# Patient Record
Sex: MALE | Race: WHITE | NOT HISPANIC OR LATINO | Employment: OTHER | URBAN - METROPOLITAN AREA
[De-identification: names, ages, dates, MRNs, and addresses within clinical notes are randomized per-mention and may not be internally consistent; named-entity substitution may affect disease eponyms.]

---

## 2023-11-01 ENCOUNTER — TELEPHONE (OUTPATIENT)
Dept: CARDIOLOGY | Facility: HOSPITAL | Age: 73
End: 2023-11-01
Payer: MEDICARE

## 2023-11-01 DIAGNOSIS — I35.0 AORTIC VALVE STENOSIS, ETIOLOGY OF CARDIAC VALVE DISEASE UNSPECIFIED: ICD-10-CM

## 2023-11-06 PROBLEM — I25.9 CHRONIC ISCHEMIC HEART DISEASE: Status: ACTIVE | Noted: 2023-11-06

## 2023-11-06 PROBLEM — B00.9 HERPES SIMPLEX WITHOUT COMPLICATION: Status: ACTIVE | Noted: 2023-11-06

## 2023-11-06 PROBLEM — M20.40 HAMMER TOE: Status: ACTIVE | Noted: 2023-11-06

## 2023-11-06 PROBLEM — G47.00 INSOMNIA: Status: ACTIVE | Noted: 2023-11-06

## 2023-11-06 PROBLEM — K21.9 GASTROESOPHAGEAL REFLUX DISEASE: Status: ACTIVE | Noted: 2023-11-06

## 2023-11-06 PROBLEM — I10 HYPERTENSION: Status: ACTIVE | Noted: 2023-11-06

## 2023-11-06 PROBLEM — J44.9 CHRONIC OBSTRUCTIVE LUNG DISEASE (MULTI): Status: ACTIVE | Noted: 2023-11-06

## 2023-11-06 PROBLEM — L84 CALLUS: Status: ACTIVE | Noted: 2023-11-06

## 2023-11-06 PROBLEM — B35.1 ONYCHOMYCOSIS OF TOENAIL: Status: ACTIVE | Noted: 2023-11-06

## 2023-11-06 PROBLEM — F32.A DEPRESSIVE DISORDER: Status: ACTIVE | Noted: 2023-11-06

## 2023-11-06 PROBLEM — Z85.820 HISTORY OF MALIGNANT MELANOMA OF SKIN: Status: ACTIVE | Noted: 2023-11-06

## 2023-11-06 PROBLEM — I35.0 AORTIC VALVE STENOSIS: Status: ACTIVE | Noted: 2023-11-06

## 2023-11-06 PROBLEM — M25.579 PAIN IN JOINT INVOLVING ANKLE AND FOOT: Status: ACTIVE | Noted: 2023-11-06

## 2023-11-06 PROBLEM — I05.0 MITRAL STENOSIS: Status: ACTIVE | Noted: 2023-11-06

## 2023-11-06 PROBLEM — F43.10 POSTTRAUMATIC STRESS DISORDER: Status: ACTIVE | Noted: 2023-11-06

## 2023-11-06 PROBLEM — G62.9 NEUROPATHY: Status: ACTIVE | Noted: 2023-11-06

## 2023-11-06 RX ORDER — MELATONIN 10 MG
10 CAPSULE ORAL NIGHTLY
COMMUNITY

## 2023-11-06 RX ORDER — METOPROLOL TARTRATE 25 MG/1
1 TABLET, FILM COATED ORAL DAILY
COMMUNITY
End: 2023-12-04 | Stop reason: SDUPTHER

## 2023-11-06 RX ORDER — LOSARTAN POTASSIUM 50 MG/1
1 TABLET ORAL DAILY
COMMUNITY
End: 2023-12-04 | Stop reason: ALTCHOICE

## 2023-11-06 RX ORDER — ESCITALOPRAM OXALATE 10 MG/1
1 TABLET ORAL 2 TIMES DAILY
COMMUNITY
Start: 2023-06-14

## 2023-11-06 RX ORDER — ASPIRIN 81 MG/1
1 TABLET ORAL DAILY
COMMUNITY
End: 2023-12-04 | Stop reason: ALTCHOICE

## 2023-11-06 RX ORDER — UBIDECARENONE 75 MG
500 CAPSULE ORAL
COMMUNITY
Start: 2023-08-14

## 2023-11-06 RX ORDER — ATORVASTATIN CALCIUM 80 MG/1
80 TABLET, FILM COATED ORAL DAILY
COMMUNITY
Start: 2023-10-16

## 2023-11-06 RX ORDER — CETIRIZINE HYDROCHLORIDE 5 MG/1
1 TABLET ORAL DAILY
COMMUNITY
Start: 2018-09-21 | End: 2023-12-04 | Stop reason: ALTCHOICE

## 2023-11-06 RX ORDER — CALCIUM CARBONATE/VITAMIN D3 600MG-5MCG
1 TABLET ORAL 2 TIMES DAILY
COMMUNITY

## 2023-11-06 RX ORDER — ESCITALOPRAM OXALATE 5 MG/1
1 TABLET ORAL DAILY
COMMUNITY
End: 2023-12-04 | Stop reason: ALTCHOICE

## 2023-11-06 RX ORDER — ASPIRIN 325 MG
1 TABLET, DELAYED RELEASE (ENTERIC COATED) ORAL DAILY
COMMUNITY
Start: 2018-09-21 | End: 2023-12-04 | Stop reason: ALTCHOICE

## 2023-11-06 RX ORDER — ZOLPIDEM TARTRATE 5 MG/1
5 TABLET ORAL NIGHTLY
COMMUNITY

## 2023-11-06 RX ORDER — METOPROLOL SUCCINATE 25 MG/1
25 TABLET, EXTENDED RELEASE ORAL DAILY
COMMUNITY
Start: 2023-03-02 | End: 2024-01-24 | Stop reason: HOSPADM

## 2023-11-07 ENCOUNTER — ANCILLARY PROCEDURE (OUTPATIENT)
Dept: RADIOLOGY | Facility: CLINIC | Age: 73
End: 2023-11-07
Payer: MEDICARE

## 2023-11-07 DIAGNOSIS — I35.0 NONRHEUMATIC AORTIC (VALVE) STENOSIS: ICD-10-CM

## 2023-11-07 DIAGNOSIS — I25.10 ATHEROSCLEROTIC HEART DISEASE OF NATIVE CORONARY ARTERY WITHOUT ANGINA PECTORIS: ICD-10-CM

## 2023-11-07 PROCEDURE — 74174 CTA ABD&PLVS W/CONTRAST: CPT

## 2023-11-07 PROCEDURE — 71275 CT ANGIOGRAPHY CHEST: CPT | Performed by: INTERNAL MEDICINE

## 2023-11-07 PROCEDURE — 2550000001 HC RX 255 CONTRASTS: Performed by: INTERNAL MEDICINE

## 2023-11-07 PROCEDURE — 74174 CTA ABD&PLVS W/CONTRAST: CPT | Performed by: INTERNAL MEDICINE

## 2023-11-07 RX ADMIN — IOHEXOL 90 ML: 350 INJECTION, SOLUTION INTRAVENOUS at 10:58

## 2023-11-08 ENCOUNTER — OFFICE VISIT (OUTPATIENT)
Dept: CARDIAC SURGERY | Facility: CLINIC | Age: 73
End: 2023-11-08
Payer: MEDICARE

## 2023-11-08 VITALS
OXYGEN SATURATION: 98 % | BODY MASS INDEX: 30.62 KG/M2 | RESPIRATION RATE: 16 BRPM | SYSTOLIC BLOOD PRESSURE: 138 MMHG | TEMPERATURE: 95.7 F | HEART RATE: 76 BPM | DIASTOLIC BLOOD PRESSURE: 71 MMHG | WEIGHT: 231 LBS | HEIGHT: 73 IN

## 2023-11-08 DIAGNOSIS — I35.0 NONRHEUMATIC AORTIC VALVE STENOSIS: Primary | ICD-10-CM

## 2023-11-08 PROCEDURE — 99205 OFFICE O/P NEW HI 60 MIN: CPT | Performed by: THORACIC SURGERY (CARDIOTHORACIC VASCULAR SURGERY)

## 2023-11-08 PROCEDURE — 3075F SYST BP GE 130 - 139MM HG: CPT | Performed by: THORACIC SURGERY (CARDIOTHORACIC VASCULAR SURGERY)

## 2023-11-08 PROCEDURE — 99215 OFFICE O/P EST HI 40 MIN: CPT | Performed by: THORACIC SURGERY (CARDIOTHORACIC VASCULAR SURGERY)

## 2023-11-08 PROCEDURE — 1036F TOBACCO NON-USER: CPT | Performed by: THORACIC SURGERY (CARDIOTHORACIC VASCULAR SURGERY)

## 2023-11-08 PROCEDURE — 3078F DIAST BP <80 MM HG: CPT | Performed by: THORACIC SURGERY (CARDIOTHORACIC VASCULAR SURGERY)

## 2023-11-08 PROCEDURE — 1159F MED LIST DOCD IN RCRD: CPT | Performed by: THORACIC SURGERY (CARDIOTHORACIC VASCULAR SURGERY)

## 2023-11-08 NOTE — PROGRESS NOTES
Chief Complaint  Dyspnea and dizziness    HPI:   Mr. Jerel Drummond is an 73 y.o. male, who is a patient of Dr.John Giovanna Middleton MD   I have been asked to see them by Mak Jensen to evaluate aortic stenosis.  He has had worsening dyspnea, dizziness with activity.  He underwent ECHO and LHC in December and again in January at [facility] with STEMI. He has known about the aortic stenosis for years, and undergoing serial surveillance echocardiography.  The patient states that there are some more blockages, but does not know severity.  He says his aortic valve is moderate.  I do not have images or reports for this.     PMHX:  HTN, HLD, CAD, MI, CVA, COPD  PSHx: Left posterior rib fixation,  Upper lip basal cell cancer resection.  Herniorrhaphy bilateral, Left knee replacement.    FAMHx: Dad had coronary disease.      Social History     Socioeconomic History    Marital status:      Spouse name: Not on file    Number of children: Not on file    Years of education: Not on file    Highest education level: Not on file   Occupational History    Not on file   Tobacco Use    Smoking status: Former     Types: Cigarettes    Smokeless tobacco: Never   Substance and Sexual Activity    Alcohol use: Not Currently    Drug use: Yes     Types: Marijuana    Sexual activity: Not on file   Other Topics Concern    Not on file   Social History Narrative    Not on file     Social Determinants of Health     Financial Resource Strain: Not on file   Food Insecurity: Not on file   Transportation Needs: Not on file   Physical Activity: Not on file   Stress: Not on file   Social Connections: Not on file   Intimate Partner Violence: Not on file   Housing Stability: Not on file       Allergies   Allergen Reactions    Ciprofloxacin Unknown    Codeine Itching    Meloxicam Other    Meperidine Other    Clindamycin Palpitations       Outpatient Encounter Medications as of 11/8/2023   Medication Sig Dispense Refill    atorvastatin  (Lipitor) 80 mg tablet Take 1 tablet (80 mg) by mouth once daily.      calcium carbonate-vitamin D3 600 mg-5 mcg (200 unit) tablet Take 1 tablet by mouth twice a day.      cetirizine (ZYRTEC) 10 mg capsule Take 1 capsule (10 mg) by mouth once daily.      cholecalciferol (Vitamin D-3) 50,000 unit capsule Take 1 capsule (50,000 Units) by mouth once daily.      cyanocobalamin (Vitamin B-12) 500 mcg tablet Take 1 tablet (500 mcg) by mouth.      escitalopram (Lexapro) 10 mg tablet Take 1 tablet (10 mg) by mouth 2 times a day.      melatonin 10 mg capsule Take 1 capsule (10 mg) by mouth once daily at bedtime.      metoprolol succinate XL (Toprol-XL) 25 mg 24 hr tablet Take 1 tablet (25 mg) by mouth once daily.      ticagrelor (Brilinta) 90 mg tablet Take 1 tablet (90 mg) by mouth twice a day.      ticagrelor (Brilinta) 90 mg tablet Take 1 tablet (90 mg) by mouth.  TAKE ONE TABLET BY MOUTH TWICE A DAY FOR ACUTE SYNDROME OF THE HEART DATE TICAGRELOR THERAPY STARTED: DEC 10,2022 DATE OF TICAGRELOR REASSESSMENT: DEC 10,2023      zolpidem (Ambien) 5 mg tablet Take 1 tablet (5 mg) by mouth once daily at bedtime.      aspirin 81 mg EC tablet Take 1 tablet (81 mg) by mouth once daily.      cetirizine (ZyrTEC) 5 mg tablet Take 1 tablet (5 mg) by mouth once daily.      escitalopram (Lexapro) 5 mg tablet Take 1 tablet (5 mg) by mouth once daily.      losartan (Cozaar) 50 mg tablet Take 1 tablet (50 mg) by mouth once daily.      metoprolol tartrate (Lopressor) 25 mg tablet Take 1 tablet (25 mg) by mouth once daily.       No facility-administered encounter medications on file as of 11/8/2023.       Physical Exam  Constitutional:       Appearance: Normal appearance. He is normal weight.   HENT:      Head: Normocephalic and atraumatic.      Mouth/Throat:      Mouth: Mucous membranes are moist.   Eyes:      General: No scleral icterus.     Pupils: Pupils are equal, round, and reactive to light.   Cardiovascular:      Rate and Rhythm:  Normal rate and regular rhythm.      Heart sounds: Murmur heard.   Pulmonary:      Effort: Pulmonary effort is normal.      Breath sounds: Normal breath sounds.   Abdominal:      General: Abdomen is flat. Bowel sounds are normal.   Musculoskeletal:      Cervical back: Normal range of motion.   Neurological:      General: No focal deficit present.      Mental Status: He is alert and oriented to person, place, and time.   Psychiatric:         Mood and Affect: Mood normal.         Thought Content: Thought content normal.         EK2023:  Sinus bradycardia.   ms;   ms. No heart block.   LHC: 2023:  RCA stent placed for ACS (STEMI). No shots of the Left coronary system    Lab Results   Component Value Date    WBC 6.8 2023    HGB 14.4 2023    HCT 42.1 2023    MCV 89 2023     2023     Lab Results   Component Value Date    GLUCOSE 98 2023    CALCIUM 9.1 2023     2023    K 4.2 2023    CO2 27 2023     2023    BUN 19 2023    CREATININE 1.07 2023       Assessment and Plan:   Mr. Jerel Drummond is an 73 y.o. male who presents with symptomatic severe aortic stenosis.  This is associated with HTN, HLD; and in the setting of COPD.  Their symptoms include Dyspnea and Fatigue.  Their imaging is consistent with symptomatic severe aortic stenosis.     We had a nice discussion regarding the indications for intervention on their valvular disease.  This included percutaneous as well as open surgical approaches. I do believe that a catheter based approach is in his best interest, given his age and co-morbidities.  He understands that the goal of this procedure will be to relieve symptoms if present, preserve left ventricular function, and prolong life.  I discussed the specific risks of vascular access bleeding, stroke and need for pacemaker placement.  In further evaluation we will obtain ECHO and LHC (or images from  Left coronary) and CT TAVR protocol. We need to determine if he has significant CAD or severe aortic stenosis.    With regards to their surgical fitness, he is a Moderate  risk candidate.  If there is compromise of vascular access or aortic root anatomy/geometry, I would offer them an open surgical approach.

## 2023-11-08 NOTE — LETTER
November 8, 2023     Mak Carrillo MD  [address]  [facility] [address]    Patient: Jerel Drummond   YOB: 1950   Date of Visit: 11/8/2023       Dear Dr. Mak Carrillo MD:    Thank you for referring Jerel Drummond to me for evaluation. Below are my notes for this consultation.  If you have questions, please do not hesitate to call me. I look forward to following your patient along with you.       Sincerely,     Jakub Eddy MD      CC: Dada Middleton MD  ______________________________________________________________________________________    Chief Complaint  Dyspnea and dizziness    HPI:   Mr. Jerel Drummond is an 73 y.o. male, who is a patient of Dr.John Giovanna Middleton MD   I have been asked to see them by Mak Jensen to evaluate aortic stenosis.  He has had worsening dyspnea, dizziness with activity.  He underwent ECHO and LHC in December and again in January at [facility] with STEMI. He has known about the aortic stenosis for years, and undergoing serial surveillance echocardiography.  The patient states that there are some more blockages, but does not know severity.  He says his aortic valve is moderate.  I do not have images or reports for this.     PMHX:  HTN, HLD, CAD, MI, CVA, COPD  PSHx: Left posterior rib fixation,  Upper lip basal cell cancer resection.  Herniorrhaphy bilateral, Left knee replacement.    FAMHx: Dad had coronary disease.      Social History     Socioeconomic History   • Marital status:      Spouse name: Not on file   • Number of children: Not on file   • Years of education: Not on file   • Highest education level: Not on file   Occupational History   • Not on file   Tobacco Use   • Smoking status: Former     Types: Cigarettes   • Smokeless tobacco: Never   Substance and Sexual Activity   • Alcohol use: Not Currently   • Drug use: Yes     Types: Marijuana   • Sexual activity: Not on file   Other Topics Concern   • Not on file    Social History Narrative   • Not on file     Social Determinants of Health     Financial Resource Strain: Not on file   Food Insecurity: Not on file   Transportation Needs: Not on file   Physical Activity: Not on file   Stress: Not on file   Social Connections: Not on file   Intimate Partner Violence: Not on file   Housing Stability: Not on file       Allergies   Allergen Reactions   • Ciprofloxacin Unknown   • Codeine Itching   • Meloxicam Other   • Meperidine Other   • Clindamycin Palpitations       Outpatient Encounter Medications as of 11/8/2023   Medication Sig Dispense Refill   • atorvastatin (Lipitor) 80 mg tablet Take 1 tablet (80 mg) by mouth once daily.     • calcium carbonate-vitamin D3 600 mg-5 mcg (200 unit) tablet Take 1 tablet by mouth twice a day.     • cetirizine (ZYRTEC) 10 mg capsule Take 1 capsule (10 mg) by mouth once daily.     • cholecalciferol (Vitamin D-3) 50,000 unit capsule Take 1 capsule (50,000 Units) by mouth once daily.     • cyanocobalamin (Vitamin B-12) 500 mcg tablet Take 1 tablet (500 mcg) by mouth.     • escitalopram (Lexapro) 10 mg tablet Take 1 tablet (10 mg) by mouth 2 times a day.     • melatonin 10 mg capsule Take 1 capsule (10 mg) by mouth once daily at bedtime.     • metoprolol succinate XL (Toprol-XL) 25 mg 24 hr tablet Take 1 tablet (25 mg) by mouth once daily.     • ticagrelor (Brilinta) 90 mg tablet Take 1 tablet (90 mg) by mouth twice a day.     • ticagrelor (Brilinta) 90 mg tablet Take 1 tablet (90 mg) by mouth.  TAKE ONE TABLET BY MOUTH TWICE A DAY FOR ACUTE SYNDROME OF THE HEART DATE TICAGRELOR THERAPY STARTED: DEC 10,2022 DATE OF TICAGRELOR REASSESSMENT: DEC 10,2023     • zolpidem (Ambien) 5 mg tablet Take 1 tablet (5 mg) by mouth once daily at bedtime.     • aspirin 81 mg EC tablet Take 1 tablet (81 mg) by mouth once daily.     • cetirizine (ZyrTEC) 5 mg tablet Take 1 tablet (5 mg) by mouth once daily.     • escitalopram (Lexapro) 5 mg tablet Take 1 tablet (5  mg) by mouth once daily.     • losartan (Cozaar) 50 mg tablet Take 1 tablet (50 mg) by mouth once daily.     • metoprolol tartrate (Lopressor) 25 mg tablet Take 1 tablet (25 mg) by mouth once daily.       No facility-administered encounter medications on file as of 2023.       Physical Exam  Constitutional:       Appearance: Normal appearance. He is normal weight.   HENT:      Head: Normocephalic and atraumatic.      Mouth/Throat:      Mouth: Mucous membranes are moist.   Eyes:      General: No scleral icterus.     Pupils: Pupils are equal, round, and reactive to light.   Cardiovascular:      Rate and Rhythm: Normal rate and regular rhythm.      Heart sounds: Murmur heard.   Pulmonary:      Effort: Pulmonary effort is normal.      Breath sounds: Normal breath sounds.   Abdominal:      General: Abdomen is flat. Bowel sounds are normal.   Musculoskeletal:      Cervical back: Normal range of motion.   Neurological:      General: No focal deficit present.      Mental Status: He is alert and oriented to person, place, and time.   Psychiatric:         Mood and Affect: Mood normal.         Thought Content: Thought content normal.         EK2023:  Sinus bradycardia.   ms;   ms. No heart block.   LHC: 2023:  RCA stent placed for ACS (STEMI). No shots of the Left coronary system    Lab Results   Component Value Date    WBC 6.8 2023    HGB 14.4 2023    HCT 42.1 2023    MCV 89 2023     2023     Lab Results   Component Value Date    GLUCOSE 98 2023    CALCIUM 9.1 2023     2023    K 4.2 2023    CO2 27 2023     2023    BUN 19 2023    CREATININE 1.07 2023       Assessment and Plan:   Mr. Jerel Drummond is an 73 y.o. male who presents with symptomatic severe aortic stenosis.  This is associated with HTN, HLD; and in the setting of COPD.  Their symptoms include Dyspnea and Fatigue.  Their imaging is  consistent with symptomatic severe aortic stenosis.     We had a nice discussion regarding the indications for intervention on their valvular disease.  This included percutaneous as well as open surgical approaches. I do believe that a catheter based approach is in his best interest, given his age and co-morbidities.  He understands that the goal of this procedure will be to relieve symptoms if present, preserve left ventricular function, and prolong life.  I discussed the specific risks of vascular access bleeding, stroke and need for pacemaker placement.  In further evaluation we will obtain ECHO and LHC (or images from Left coronary) and CT TAVR protocol. We need to determine if he has significant CAD or severe aortic stenosis.    With regards to their surgical fitness, he is a Moderate  risk candidate.  If there is compromise of vascular access or aortic root anatomy/geometry, I would offer them an open surgical approach.

## 2023-11-15 ENCOUNTER — TELEMEDICINE (OUTPATIENT)
Dept: CARDIOLOGY | Facility: HOSPITAL | Age: 73
End: 2023-11-15
Payer: MEDICARE

## 2023-11-15 DIAGNOSIS — I35.0 SEVERE AORTIC STENOSIS: Primary | ICD-10-CM

## 2023-11-15 PROCEDURE — 99205 OFFICE O/P NEW HI 60 MIN: CPT | Performed by: INTERNAL MEDICINE

## 2023-11-15 NOTE — PROGRESS NOTES
PCP: Dr. Middleton   Cardio: Dr. Carrillo     I was asked by Dr. Carrillo  to evaluate this patient in consultation for evaluation of severe aortic stenosis.   ?  The patient is a 73-year-old male with past medical history of hypertension, hyperlipidemia, CAD, MI, and CVA who has been having worsening shortness of breath and fatigue for 1 year or so with activities like walking or doing any exertion.     He denies any chest pain, syncope. No LE edema or orthopnea.    He was found to have severe aortic stenosis and referred for C that showed severe RCA disease treated with successful PCI using RANDI. He has been referred for CTA (images unable to reviewed).    The work-up otherwise is as summarized below:    Echo images were reviewed for this encounter and as summarized below.    NYHA: Class II  Frailty score: 1/5  EKG: Normal sinus,  ms  Echo  -September 2023 EF 6065%, aortic valve mean gradient 39 mmHg, aortic valve V-max 4.29 m/s, aortic valve area 0.92 cm,  C -PCI of RCA in January 2023, no images for diagnostic cath available  CT-TAVR: done  Dental: Pending    STS Score:  Procedure Type: Isolated AVR  Perioperative Outcome Estimate %  Operative Mortality 1.4%  Morbidity & Mortality  7.33%  Stroke 0.969%  Renal Failure 0.996%  Reoperation   4.3%  Prolonged Ventilation 2.59%  Deep Sternal Wound Infection 0.048%  Long Hospital Stay (>14 days) 2.26%  Short Hospital Stay (<6 days)* 53%  ??  Given the patient's severe symptomatic aortic stenosis, the patient is referred to our structural heart clinic for consideration of aortic valve replacement.          ROS:  Constitutional: + tired fatigued, not feeling poorly, no fever and no chills.   Eyes: no eyesight problems, no blurred vision, no diplopia, no eye pain, no purulent discharge from the eyes, eyes not red, no dryness of the eyes and no itching of the eyes.   ENT: no nosebleeds, no hearing loss, no tinnitus, no earache, no sore throat, no  hoarseness, no swollen glands in the neck and no nasal discharge.   Cardiovascular: dyspnea on exertion  Respiratory: + dyspnea, no chronic cough, not coughing up sputum,  no wheezing that is consistent with asthma, no asthma, no orthopnea and no postural nocturnal dyspnea.   Gastrointestinal: no change in bowel habits, no blood in stools, no diarrhea, no constipation, no nausea, no vomiting, no abdominal pain, no signs and symptoms of ulcer disease, no akshat colored stools and no intolerance to fatty foods.   Genitourinary: no hematuria,  no urinary frequency, no dysuria, no incontinence, no burning sensation during urination, no urinary hesitancy, no nocturia, no genital lesion, no testicular pain, urinary stream is not smaller and urinary stream does not start and stop.   Musculoskeletal: no arthralgias, no myalgias, no joint swelling, no joint stiffness, no muscle weakness, no back pain, no limb pain, no limb swelling and no difficulty walking.   Skin: no skin rashes, no change in skin color and pigmentation, no skin lesions and no skin lumps.   Neurological: no seizures, no frequent falls, no headaches, no dizziness, no tingling, no fainting and no limb weakness.   Psychiatric: no depression, not suicidal, no confusion, no memory lapses or loss, no anxiety, no personality change and no emotional problems.   Endocrine: no goiter, no thyroid disorder, no diabetes mellitus, no excessive thirst, no dry skin, no cold intolerance, no heat intolerance, no erectile dysfunction, no increased urinary frequency, no proptosis and no deepening of the voice.   Hematologic/Lymphatic: no bleeding issues.   All other systems have been reviewed and are negative for complaint.         Visit Vitals  Smoking Status Former         Results for orders placed or performed in visit on 01/19/23   Basic Metabolic Panel   Result Value Ref Range    Glucose 98 74 - 99 mg/dL    Sodium 139 136 - 145 mmol/L    Potassium 4.2 3.5 - 5.3 mmol/L     Chloride 106 98 - 107 mmol/L    Bicarbonate 27 21 - 32 mmol/L    Anion Gap 10 10 - 20 mmol/L    Urea Nitrogen 19 6 - 23 mg/dL    Creatinine 1.07 0.50 - 1.30 mg/dL    GFR MALE 72 >90 mL/min/1.73m2    Calcium 9.1 8.6 - 10.3 mg/dL   Protime-INR   Result Value Ref Range    Protime 12.1 9.8 - 13.4 sec    INR 1.0 0.9 - 1.1   CBC   Result Value Ref Range    WBC 6.8 4.4 - 11.3 x10E9/L    RBC 4.75 4.50 - 5.90 x10E12/L    Hemoglobin 14.4 13.5 - 17.5 g/dL    Hematocrit 42.1 41.0 - 52.0 %    MCV 89 80 - 100 fL    MCHC 34.2 32.0 - 36.0 g/dL    Platelets 159 150 - 450 x10E9/L    RDW 13.5 11.5 - 14.5 %   Electrocardiogram 12 Lead   Result Value Ref Range    Ventricular Rate 58 BPM    Atrial Rate 58 BPM    MO Interval 202 ms    QRS Duration 106 ms    QT Interval 446 ms    QTC Calculation(Bazett) 437 ms    P Axis 1 degrees    R Axis -51 degrees    T Axis 35 degrees    QRS Count 10 beats    Q Onset 207 ms    P Onset 106 ms    P Offset 149 ms    T Offset 430 ms    QTC Fredericia 440 ms   Electrocardiogram 12 Lead   Result Value Ref Range    Ventricular Rate 58 BPM    Atrial Rate 58 BPM    MO Interval 196 ms    QRS Duration 106 ms    QT Interval 428 ms    QTC Calculation(Bazett) 420 ms    P Axis -3 degrees    R Axis -44 degrees    T Axis 11 degrees    QRS Count 10 beats    Q Onset 209 ms    P Onset 111 ms    P Offset 161 ms    T Offset 423 ms    QTC Fredericia 423 ms            Current Outpatient Medications   Medication Instructions    aspirin 81 mg EC tablet 1 tablet, oral, Daily    atorvastatin (LIPITOR) 80 mg, oral, Daily    calcium carbonate-vitamin D3 600 mg-5 mcg (200 unit) tablet 1 tablet, oral, 2 times daily    cetirizine (ZyrTEC) 5 mg tablet 1 tablet, oral, Daily    cetirizine (ZYRTEC) 10 mg, oral, Daily    cholecalciferol (Vitamin D-3) 50,000 unit capsule 1 capsule, oral, Daily    cyanocobalamin (VITAMIN B-12) 500 mcg, oral    escitalopram (Lexapro) 10 mg tablet 1 tablet, oral, 2 times daily    escitalopram (Lexapro) 5 mg  tablet 1 tablet, oral, Daily    losartan (Cozaar) 50 mg tablet 1 tablet, oral, Daily    melatonin 10 mg, oral, Nightly    metoprolol succinate XL (TOPROL-XL) 25 mg, oral, Daily    metoprolol tartrate (Lopressor) 25 mg tablet 1 tablet, oral, Daily    ticagrelor (BRILINTA) 90 mg, oral, 2 times daily    ticagrelor (BRILINTA) 90 mg, oral,  TAKE ONE TABLET BY MOUTH TWICE A DAY FOR ACUTE SYNDROME OF THE HEART DATE TICAGRELOR THERAPY STARTED: DEC 10,2022 DATE OF TICAGRELOR REASSESSMENT: DEC 10,2023    zolpidem (AMBIEN) 5 mg, oral, Nightly          Assessment:  Patient will 72-year-old male with symptomatic severe aortic stenosis, NYHA II symptoms    -Patient has undergone work-up with left heart cath, echocardiogram and CT TAVR  -We will get images for left heart cath and echocardiogram    The overall decision regarding the best treatment for her condition is complex.  We discussed options of both surgical aortic valve replacement and transcatheter aortic valve replacement along with with risks and benefits involved with both of them in detail.    All the risks of the procedures including but not limited to groin complications, CVA, MI, pericardial tamponade, risk of PPM and death were discussed with the patient and family in detail .The patient verbalized understanding and decided to proceed with the procedure.    The patient will be discussed at Valve Team meeting for final decision making.     Thank you Dr. Carrillo  for allowing us to participate in this patient's care.

## 2023-11-20 ENCOUNTER — CARDIOLOGY CONFERENCE (OUTPATIENT)
Dept: CARDIOLOGY | Facility: HOSPITAL | Age: 73
End: 2023-11-20
Payer: MEDICARE

## 2023-11-20 NOTE — PROGRESS NOTES
Case reviewed in Valve and Structural Heart team meeting.  Heavily calcified valve.  Sent email to Dr. Coon and Dr. Eddy.  Recommend surgery.

## 2023-11-24 ENCOUNTER — HOSPITAL ENCOUNTER (OUTPATIENT)
Dept: RADIOLOGY | Facility: EXTERNAL LOCATION | Age: 73
Discharge: HOME | End: 2023-11-24

## 2023-12-04 ENCOUNTER — TELEMEDICINE (OUTPATIENT)
Dept: CARDIAC SURGERY | Facility: CLINIC | Age: 73
End: 2023-12-04
Payer: MEDICARE

## 2023-12-04 DIAGNOSIS — I35.0 NONRHEUMATIC AORTIC VALVE STENOSIS: Primary | ICD-10-CM

## 2023-12-04 PROCEDURE — 99215 OFFICE O/P EST HI 40 MIN: CPT | Performed by: THORACIC SURGERY (CARDIOTHORACIC VASCULAR SURGERY)

## 2023-12-04 RX ORDER — ACETAMINOPHEN 500 MG
5000 TABLET ORAL DAILY
COMMUNITY

## 2023-12-04 SDOH — ECONOMIC STABILITY: FOOD INSECURITY: WITHIN THE PAST 12 MONTHS, YOU WORRIED THAT YOUR FOOD WOULD RUN OUT BEFORE YOU GOT MONEY TO BUY MORE.: NEVER TRUE

## 2023-12-04 SDOH — ECONOMIC STABILITY: FOOD INSECURITY: WITHIN THE PAST 12 MONTHS, THE FOOD YOU BOUGHT JUST DIDN'T LAST AND YOU DIDN'T HAVE MONEY TO GET MORE.: NEVER TRUE

## 2023-12-04 ASSESSMENT — COLUMBIA-SUICIDE SEVERITY RATING SCALE - C-SSRS
2. HAVE YOU ACTUALLY HAD ANY THOUGHTS OF KILLING YOURSELF?: NO
6. HAVE YOU EVER DONE ANYTHING, STARTED TO DO ANYTHING, OR PREPARED TO DO ANYTHING TO END YOUR LIFE?: NO
1. IN THE PAST MONTH, HAVE YOU WISHED YOU WERE DEAD OR WISHED YOU COULD GO TO SLEEP AND NOT WAKE UP?: NO

## 2023-12-04 ASSESSMENT — PATIENT HEALTH QUESTIONNAIRE - PHQ9
SUM OF ALL RESPONSES TO PHQ9 QUESTIONS 1 AND 2: 0
2. FEELING DOWN, DEPRESSED OR HOPELESS: NOT AT ALL
1. LITTLE INTEREST OR PLEASURE IN DOING THINGS: NOT AT ALL

## 2023-12-04 ASSESSMENT — ENCOUNTER SYMPTOMS
OCCASIONAL FEELINGS OF UNSTEADINESS: 1
DEPRESSION: 0
LOSS OF SENSATION IN FEET: 1

## 2023-12-04 NOTE — PROGRESS NOTES
Chief Complaint  Dyspnea and Fatigue    HPI:  Mr. Jerel Drummond is an 73 y.o. male, who is a patient of Dr.John Giovanna Middleton MD   I have been asked to see them by Mak Jensen to evaluate aortic stenosis.  He has had worsening dyspnea, dizziness with activity.  He underwent ECHO and LHC in December and again in January at [facility] with STEMI. He has known about the aortic stenosis for years, and undergoing serial surveillance echocardiography. Upon evaluation by the D selection committee, he has a heavily calcified bicuspid aortic valve, and would be best served with a surgical approach.  He is calling today to go over specifics of surgical recommendations. He has fatigue as his biggest symptom. He denies CP, syncope or PND.       Past Medical History:   Diagnosis Date    Aortic stenosis     Restrepo's esophagus     Cataract     Depression     Fracture, ribs     HTN (hypertension)     Hyperlipidemia     Insomnia     Neuropathy        Past Surgical History:   Procedure Laterality Date    CATARACT EXTRACTION      RETINAL DETACHMENT SURGERY      RIB FRACTURE SURGERY         Family History   Problem Relation Name Age of Onset    Stomach cancer Mother         Social History     Socioeconomic History    Marital status:      Spouse name: Not on file    Number of children: Not on file    Years of education: Not on file    Highest education level: Not on file   Occupational History    Not on file   Tobacco Use    Smoking status: Former     Types: Cigarettes    Smokeless tobacco: Never   Substance and Sexual Activity    Alcohol use: Not Currently    Drug use: Yes     Types: Marijuana    Sexual activity: Not on file   Other Topics Concern    Not on file   Social History Narrative    Not on file     Social Determinants of Health     Financial Resource Strain: Not on file   Food Insecurity: No Food Insecurity (12/4/2023)    Hunger Vital Sign     Worried About Running Out of Food in the Last Year:  Never true     Ran Out of Food in the Last Year: Never true   Transportation Needs: Not on file   Physical Activity: Not on file   Stress: Not on file   Social Connections: Not on file   Intimate Partner Violence: Not on file   Housing Stability: Not on file       Allergies   Allergen Reactions    Chlorine Swelling    Ciprofloxacin Unknown    Codeine Itching    Meloxicam Other    Meperidine Other    Clindamycin Palpitations       Outpatient Encounter Medications as of 12/4/2023   Medication Sig Dispense Refill    atorvastatin (Lipitor) 80 mg tablet Take 1 tablet (80 mg) by mouth once daily.      calcium carbonate-vitamin D3 600 mg-5 mcg (200 unit) tablet Take 1 tablet by mouth twice a day.      cetirizine (ZYRTEC) 10 mg capsule Take 1 capsule (10 mg) by mouth once daily.      cholecalciferol (Vitamin D3) 5,000 Units tablet Take 1 tablet (5,000 Units) by mouth once daily.      cyanocobalamin (Vitamin B-12) 500 mcg tablet Take 1 tablet (500 mcg) by mouth.      escitalopram (Lexapro) 10 mg tablet Take 1 tablet (10 mg) by mouth 2 times a day.      melatonin 10 mg capsule Take 1 capsule (10 mg) by mouth once daily at bedtime.      metoprolol succinate XL (Toprol-XL) 25 mg 24 hr tablet Take 1 tablet (25 mg) by mouth once daily.      ticagrelor (Brilinta) 90 mg tablet Take 1 tablet (90 mg) by mouth.  TAKE ONE TABLET BY MOUTH TWICE A DAY FOR ACUTE SYNDROME OF THE HEART DATE TICAGRELOR THERAPY STARTED: DEC 10,2022 DATE OF TICAGRELOR REASSESSMENT: DEC 10,2023      zolpidem (Ambien) 5 mg tablet Take 1 tablet (5 mg) by mouth once daily at bedtime.      [DISCONTINUED] aspirin 81 mg EC tablet Take 1 tablet (81 mg) by mouth once daily.      [DISCONTINUED] cetirizine (ZyrTEC) 5 mg tablet Take 1 tablet (5 mg) by mouth once daily.      [DISCONTINUED] cholecalciferol (Vitamin D-3) 50,000 unit capsule Take 1 capsule (50,000 Units) by mouth once daily.      [DISCONTINUED] escitalopram (Lexapro) 5 mg tablet Take 1 tablet (5 mg) by mouth  once daily.      [DISCONTINUED] losartan (Cozaar) 50 mg tablet Take 1 tablet (50 mg) by mouth once daily.      [DISCONTINUED] metoprolol tartrate (Lopressor) 25 mg tablet Take 1 tablet (25 mg) by mouth once daily.      [DISCONTINUED] ticagrelor (Brilinta) 90 mg tablet Take 1 tablet (90 mg) by mouth twice a day.       No facility-administered encounter medications on file as of 2023.       Physical Exam  Neurological:      Mental Status: He is alert and oriented to person, place, and time.         No results found for this or any previous visit (from the past 4464 hour(s)).    Lab Results   Component Value Date    WBC 6.8 2023    HGB 14.4 2023    HCT 42.1 2023    MCV 89 2023     2023     Lab Results   Component Value Date    GLUCOSE 98 2023    CALCIUM 9.1 2023     2023    K 4.2 2023    CO2 27 2023     2023    BUN 19 2023    CREATININE 1.07 2023     EK2023:  Sinus bradycardia.   ms;   ms. No heart block.   LHC: 105/23:  Patent stents in the RCA, LAD and LCX.  No significant flow limiting CAD.     Assessment and Plan:   Mr. Jerel Drummond is an 73 y.o. male who presents with symptomatic severe aortic stenosis.  This is associated with HTN, HLD; and in the setting of COPD.  Their symptoms include Dyspnea and Fatigue.  Their imaging is consistent with symptomatic severe aortic stenosis.      We had a nice discussion regarding the indications for intervention on their valvular disease.  This included percutaneous as well as open surgical approaches.  He understands that the goal of this procedure will be to relieve symptoms if present, preserve left ventricular function, and prolong life.  I discussed the specific risks of vascular access bleeding, stroke and need for pacemaker placement. We discussed that given his BAV and surgical fitness, the D selection committee and I, thought that a surgical  approach would be in his best interest.  He is interested in a tissue valve.  We  have settled on 1/19/23 for a surgical date, we will arrange for PAT.       I discussed with him our stroke prevention trial, he would like to think about it.

## 2023-12-18 ENCOUNTER — TELEMEDICINE (OUTPATIENT)
Dept: CARDIAC SURGERY | Facility: CLINIC | Age: 73
End: 2023-12-18
Payer: MEDICARE

## 2023-12-18 DIAGNOSIS — I35.0 NONRHEUMATIC AORTIC VALVE STENOSIS: Primary | ICD-10-CM

## 2023-12-18 DIAGNOSIS — Z01.810 PRE-OPERATIVE CARDIOVASCULAR EXAMINATION, RECENT MI (CMS/HCC): ICD-10-CM

## 2023-12-18 DIAGNOSIS — I21.9 PRE-OPERATIVE CARDIOVASCULAR EXAMINATION, RECENT MI (CMS/HCC): ICD-10-CM

## 2023-12-18 DIAGNOSIS — E74.819 DISORDERS OF GLUCOSE TRANSPORT, UNSPECIFIED (MULTI): ICD-10-CM

## 2023-12-18 PROCEDURE — 99214 OFFICE O/P EST MOD 30 MIN: CPT | Performed by: THORACIC SURGERY (CARDIOTHORACIC VASCULAR SURGERY)

## 2023-12-18 RX ORDER — OMEPRAZOLE 40 MG/1
20 CAPSULE, DELAYED RELEASE ORAL
COMMUNITY
Start: 2023-12-13

## 2023-12-18 ASSESSMENT — ENCOUNTER SYMPTOMS
OCCASIONAL FEELINGS OF UNSTEADINESS: 1
LOSS OF SENSATION IN FEET: 1
DEPRESSION: 0

## 2023-12-18 NOTE — LETTER
December 18, 2023     Mak Carrillo MD  [address]  [facility] [address]    Patient: Jerel Drummond   YOB: 1950   Date of Visit: 12/18/2023       Dear Dr. Mak Carrillo MD:    Thank you for referring Jerel Drummond to me for evaluation. Below are my notes for this consultation.  If you have questions, please do not hesitate to call me. I look forward to following your patient along with you.       Sincerely,     Jakub Eddy MD      CC: Dada Middleton MD  ______________________________________________________________________________________    Chief Complaint  Dyspnea and dizziness     HPI:  Mr. Jerel Drummond is an 73 y.o. male, who is a patient of Dr.John Giovanna Middleton MD   I have been asked to see them by Mak Jensen to evaluate aortic stenosis.  He has had worsening dyspnea, dizziness with activity.  He underwent ECHO and LHC in December and again in January at [facility] with STEMI. He has known about the aortic stenosis for years, and undergoing serial surveillance echocardiography. Upon evaluation by the D selection committee, he has a heavily calcified bicuspid aortic valve, and would be best served with a surgical approach.  He is calling today to go over specifics of surgical recommendations. He has fatigue as his biggest symptom. He denies CP, syncope or PND.      PMHX:  HTN, HLD, CAD, MI, CVA, COPD  PSHx: Left posterior rib fixation,  Upper lip basal cell cancer resection.  Herniorrhaphy bilateral, Left knee replacement.    FAMHx: Dad had coronary disease.       Social History   []Expand by Default            Socioeconomic History   • Marital status:        Spouse name: Not on file   • Number of children: Not on file   • Years of education: Not on file   • Highest education level: Not on file   Occupational History   • Not on file   Tobacco Use   • Smoking status: Former       Types: Cigarettes   • Smokeless tobacco: Never   Substance  and Sexual Activity   • Alcohol use: Not Currently   • Drug use: Yes       Types: Marijuana   • Sexual activity: Not on file   Other Topics Concern   • Not on file   Social History Narrative   • Not on file      Social Determinants of Health      Financial Resource Strain: Not on file   Food Insecurity: Not on file   Transportation Needs: Not on file   Physical Activity: Not on file   Stress: Not on file   Social Connections: Not on file   Intimate Partner Violence: Not on file   Housing Stability: Not on file                 Allergies   Allergen Reactions   • Ciprofloxacin Unknown   • Codeine Itching   • Meloxicam Other   • Meperidine Other   • Clindamycin Palpitations         Encounter Medications          Outpatient Encounter Medications as of 11/8/2023   Medication Sig Dispense Refill   • atorvastatin (Lipitor) 80 mg tablet Take 1 tablet (80 mg) by mouth once daily.       • calcium carbonate-vitamin D3 600 mg-5 mcg (200 unit) tablet Take 1 tablet by mouth twice a day.       • cetirizine (ZYRTEC) 10 mg capsule Take 1 capsule (10 mg) by mouth once daily.       • cholecalciferol (Vitamin D-3) 50,000 unit capsule Take 1 capsule (50,000 Units) by mouth once daily.       • cyanocobalamin (Vitamin B-12) 500 mcg tablet Take 1 tablet (500 mcg) by mouth.       • escitalopram (Lexapro) 10 mg tablet Take 1 tablet (10 mg) by mouth 2 times a day.       • melatonin 10 mg capsule Take 1 capsule (10 mg) by mouth once daily at bedtime.       • metoprolol succinate XL (Toprol-XL) 25 mg 24 hr tablet Take 1 tablet (25 mg) by mouth once daily.       • ticagrelor (Brilinta) 90 mg tablet Take 1 tablet (90 mg) by mouth twice a day.       • ticagrelor (Brilinta) 90 mg tablet Take 1 tablet (90 mg) by mouth.    TAKE ONE TABLET BY MOUTH TWICE A DAY FOR ACUTE SYNDROME OF THE HEART DATE TICAGRELOR THERAPY STARTED: DEC 10,2022 DATE OF TICAGRELOR REASSESSMENT: DEC 10,2023       • zolpidem (Ambien) 5 mg tablet Take 1 tablet (5 mg) by mouth once  daily at bedtime.       • aspirin 81 mg EC tablet Take 1 tablet (81 mg) by mouth once daily.       • cetirizine (ZyrTEC) 5 mg tablet Take 1 tablet (5 mg) by mouth once daily.       • escitalopram (Lexapro) 5 mg tablet Take 1 tablet (5 mg) by mouth once daily.       • losartan (Cozaar) 50 mg tablet Take 1 tablet (50 mg) by mouth once daily.       • metoprolol tartrate (Lopressor) 25 mg tablet Take 1 tablet (25 mg) by mouth once daily.          No facility-administered encounter medications on file as of 2023.            Physical Exam  Constitutional:       Appearance: Normal appearance. He is normal weight.   HENT:      Head: Normocephalic and atraumatic.      Mouth/Throat:      Mouth: Mucous membranes are moist.   Eyes:      General: No scleral icterus.     Pupils: Pupils are equal, round, and reactive to light.   Cardiovascular:      Rate and Rhythm: Normal rate and regular rhythm.      Heart sounds: Murmur heard.   Pulmonary:      Effort: Pulmonary effort is normal.      Breath sounds: Normal breath sounds.   Abdominal:      General: Abdomen is flat. Bowel sounds are normal.   Musculoskeletal:      Cervical back: Normal range of motion.   Neurological:      General: No focal deficit present.      Mental Status: He is alert and oriented to person, place, and time.   Psychiatric:         Mood and Affect: Mood normal.         Thought Content: Thought content normal.          EK2023:  Sinus bradycardia.   ms;   ms. No heart block.   LHC: 105/23:  Patent stents in the RCA, LAD and LCX.  No significant flow limiting CAD.            Lab Results   Component Value Date     WBC 6.8 2023     HGB 14.4 2023     HCT 42.1 2023     MCV 89 2023      2023            Lab Results   Component Value Date     GLUCOSE 98 2023     CALCIUM 9.1 2023      2023     K 4.2 2023     CO2 27 2023      2023     BUN 19 2023      CREATININE 1.07 01/19/2023         Assessment and Plan:   Mr. Jerel Drummond is an 73 y.o. male who presents with symptomatic severe aortic stenosis.  This is associated with HTN, HLD; and in the setting of COPD.  Their symptoms include Dyspnea and Fatigue.  Their imaging is consistent with symptomatic severe aortic stenosis.      We had a nice discussion regarding the indications for intervention on their valvular disease.  This included percutaneous as well as open surgical approaches.  He understands that the goal of this procedure will be to relieve symptoms if present, preserve left ventricular function, and prolong life.  I discussed the specific risks of vascular access bleeding, stroke and need for pacemaker placement. We discussed that given his BAV and surgical fitness, the D selection committee and I, thought that a surgical approach would be in his best interest.  He is interested in a tissue valve.  We  have settled on 1/19/23 for a surgical date, we will arrange for PAT.      I discussed with him our stroke prevention trial, he would like to think about it.

## 2023-12-18 NOTE — PROGRESS NOTES
Chief Complaint  Dyspnea and dizziness     HPI:  Mr. Jerel Drummond is an 73 y.o. male, who is a patient of Dr.John Giovanna Middleton MD   I have been asked to see them by Mak Jensen to evaluate aortic stenosis.  He has had worsening dyspnea, dizziness with activity.  He underwent ECHO and LHC in December and again in January at [facility] with STEMI. He has known about the aortic stenosis for years, and undergoing serial surveillance echocardiography. Upon evaluation by the D selection committee, he has a heavily calcified bicuspid aortic valve, and would be best served with a surgical approach.  He is calling today to go over specifics of surgical recommendations. He has fatigue as his biggest symptom. He denies CP, syncope or PND.      PMHX:  HTN, HLD, CAD, MI, CVA, COPD  PSHx: Left posterior rib fixation,  Upper lip basal cell cancer resection.  Herniorrhaphy bilateral, Left knee replacement.    FAMHx: Dad had coronary disease.       Social History   []Expand by Default            Socioeconomic History    Marital status:        Spouse name: Not on file    Number of children: Not on file    Years of education: Not on file    Highest education level: Not on file   Occupational History    Not on file   Tobacco Use    Smoking status: Former       Types: Cigarettes    Smokeless tobacco: Never   Substance and Sexual Activity    Alcohol use: Not Currently    Drug use: Yes       Types: Marijuana    Sexual activity: Not on file   Other Topics Concern    Not on file   Social History Narrative    Not on file      Social Determinants of Health      Financial Resource Strain: Not on file   Food Insecurity: Not on file   Transportation Needs: Not on file   Physical Activity: Not on file   Stress: Not on file   Social Connections: Not on file   Intimate Partner Violence: Not on file   Housing Stability: Not on file                 Allergies   Allergen Reactions    Ciprofloxacin Unknown    Codeine Itching     Meloxicam Other    Meperidine Other    Clindamycin Palpitations         Encounter Medications          Outpatient Encounter Medications as of 11/8/2023   Medication Sig Dispense Refill    atorvastatin (Lipitor) 80 mg tablet Take 1 tablet (80 mg) by mouth once daily.        calcium carbonate-vitamin D3 600 mg-5 mcg (200 unit) tablet Take 1 tablet by mouth twice a day.        cetirizine (ZYRTEC) 10 mg capsule Take 1 capsule (10 mg) by mouth once daily.        cholecalciferol (Vitamin D-3) 50,000 unit capsule Take 1 capsule (50,000 Units) by mouth once daily.        cyanocobalamin (Vitamin B-12) 500 mcg tablet Take 1 tablet (500 mcg) by mouth.        escitalopram (Lexapro) 10 mg tablet Take 1 tablet (10 mg) by mouth 2 times a day.        melatonin 10 mg capsule Take 1 capsule (10 mg) by mouth once daily at bedtime.        metoprolol succinate XL (Toprol-XL) 25 mg 24 hr tablet Take 1 tablet (25 mg) by mouth once daily.        ticagrelor (Brilinta) 90 mg tablet Take 1 tablet (90 mg) by mouth twice a day.        ticagrelor (Brilinta) 90 mg tablet Take 1 tablet (90 mg) by mouth.    TAKE ONE TABLET BY MOUTH TWICE A DAY FOR ACUTE SYNDROME OF THE HEART DATE TICAGRELOR THERAPY STARTED: DEC 10,2022 DATE OF TICAGRELOR REASSESSMENT: DEC 10,2023        zolpidem (Ambien) 5 mg tablet Take 1 tablet (5 mg) by mouth once daily at bedtime.        aspirin 81 mg EC tablet Take 1 tablet (81 mg) by mouth once daily.        cetirizine (ZyrTEC) 5 mg tablet Take 1 tablet (5 mg) by mouth once daily.        escitalopram (Lexapro) 5 mg tablet Take 1 tablet (5 mg) by mouth once daily.        losartan (Cozaar) 50 mg tablet Take 1 tablet (50 mg) by mouth once daily.        metoprolol tartrate (Lopressor) 25 mg tablet Take 1 tablet (25 mg) by mouth once daily.          No facility-administered encounter medications on file as of 11/8/2023.            Physical Exam  Constitutional:       Appearance: Normal appearance. He is normal weight.   HENT:       Head: Normocephalic and atraumatic.      Mouth/Throat:      Mouth: Mucous membranes are moist.   Eyes:      General: No scleral icterus.     Pupils: Pupils are equal, round, and reactive to light.   Cardiovascular:      Rate and Rhythm: Normal rate and regular rhythm.      Heart sounds: Murmur heard.   Pulmonary:      Effort: Pulmonary effort is normal.      Breath sounds: Normal breath sounds.   Abdominal:      General: Abdomen is flat. Bowel sounds are normal.   Musculoskeletal:      Cervical back: Normal range of motion.   Neurological:      General: No focal deficit present.      Mental Status: He is alert and oriented to person, place, and time.   Psychiatric:         Mood and Affect: Mood normal.         Thought Content: Thought content normal.          EK2023:  Sinus bradycardia.   ms;   ms. No heart block.   LHC: 105/23:  Patent stents in the RCA, LAD and LCX.  No significant flow limiting CAD.            Lab Results   Component Value Date     WBC 6.8 2023     HGB 14.4 2023     HCT 42.1 2023     MCV 89 2023      2023            Lab Results   Component Value Date     GLUCOSE 98 2023     CALCIUM 9.1 2023      2023     K 4.2 2023     CO2 27 2023      2023     BUN 19 2023     CREATININE 1.07 2023         Assessment and Plan:   Mr. Jerel Drummond is an 73 y.o. male who presents with symptomatic severe aortic stenosis.  This is associated with HTN, HLD; and in the setting of COPD.  Their symptoms include Dyspnea and Fatigue.  Their imaging is consistent with symptomatic severe aortic stenosis.      We had a nice discussion regarding the indications for intervention on their valvular disease.  This included percutaneous as well as open surgical approaches.  He understands that the goal of this procedure will be to relieve symptoms if present, preserve left ventricular function, and prolong  life.  I discussed the specific risks of vascular access bleeding, stroke and need for pacemaker placement. We discussed that given his BAV and surgical fitness, the D selection committee and I, thought that a surgical approach would be in his best interest.  He is interested in a tissue valve.  We  have settled on 1/19/23 for a surgical date, we will arrange for PAT.      I discussed with him our stroke prevention trial, he would like to think about it.

## 2023-12-19 PROBLEM — I35.0 CALCIFIC AORTIC STENOSIS: Status: ACTIVE | Noted: 2023-12-19

## 2023-12-20 ENCOUNTER — TELEPHONE (OUTPATIENT)
Dept: OTOLARYNGOLOGY | Facility: CLINIC | Age: 73
End: 2023-12-20
Payer: MEDICARE

## 2023-12-20 NOTE — TELEPHONE ENCOUNTER
I spoke with Jerel and reviewed his cardiac surgery date of 1/19/24 at 0730 with him.  I informed him that per Dr. Eddy needs him to hold his Brilinta for 5 days prior to surgery and to take his Metoprolol on the morning of surgery with a sip of water. I also informed him that he needs dental clearance prior to his heart surgery. I informed him that I am mailing him a surgery information sheet regarding everything I discussed with him and also the dental clearance sheet that he needs to take to his dentist and they can fax it back to us.  [facility] pre admission testing department will be calling him to set up a day where he comes to [facility] to get all the rest of his testing done in one day prior to surgery.

## 2024-01-16 ENCOUNTER — LAB (OUTPATIENT)
Dept: LAB | Facility: LAB | Age: 74
End: 2024-01-16
Payer: MEDICARE

## 2024-01-16 ENCOUNTER — HOSPITAL ENCOUNTER (OUTPATIENT)
Dept: CARDIOLOGY | Facility: HOSPITAL | Age: 74
Discharge: HOME | DRG: 219 | End: 2024-01-16
Payer: MEDICARE

## 2024-01-16 ENCOUNTER — HOSPITAL ENCOUNTER (OUTPATIENT)
Dept: RADIOLOGY | Facility: HOSPITAL | Age: 74
Discharge: HOME | DRG: 219 | End: 2024-01-16
Payer: MEDICARE

## 2024-01-16 ENCOUNTER — PRE-ADMISSION TESTING (OUTPATIENT)
Dept: PREADMISSION TESTING | Facility: HOSPITAL | Age: 74
DRG: 219 | End: 2024-01-16
Payer: MEDICARE

## 2024-01-16 VITALS
SYSTOLIC BLOOD PRESSURE: 142 MMHG | HEART RATE: 71 BPM | DIASTOLIC BLOOD PRESSURE: 94 MMHG | RESPIRATION RATE: 18 BRPM | HEIGHT: 72 IN | OXYGEN SATURATION: 97 % | TEMPERATURE: 97 F | BODY MASS INDEX: 32.16 KG/M2 | WEIGHT: 237.44 LBS

## 2024-01-16 DIAGNOSIS — I21.9 PRE-OPERATIVE CARDIOVASCULAR EXAMINATION, RECENT MI (CMS/HCC): ICD-10-CM

## 2024-01-16 DIAGNOSIS — Z01.818 PRE-OP TESTING: ICD-10-CM

## 2024-01-16 DIAGNOSIS — I35.0 NONRHEUMATIC AORTIC VALVE STENOSIS: ICD-10-CM

## 2024-01-16 DIAGNOSIS — E74.819 DISORDERS OF GLUCOSE TRANSPORT, UNSPECIFIED (MULTI): ICD-10-CM

## 2024-01-16 DIAGNOSIS — I24.9 ACS (ACUTE CORONARY SYNDROME) (MULTI): ICD-10-CM

## 2024-01-16 DIAGNOSIS — Z01.810 PRE-OPERATIVE CARDIOVASCULAR EXAMINATION, RECENT MI (CMS/HCC): ICD-10-CM

## 2024-01-16 LAB
ABO GROUP (TYPE) IN BLOOD: NORMAL
ALBUMIN SERPL BCP-MCNC: 4.4 G/DL (ref 3.4–5)
ALP SERPL-CCNC: 94 U/L (ref 33–136)
ALT SERPL W P-5'-P-CCNC: 27 U/L (ref 10–52)
ANION GAP SERPL CALC-SCNC: 11 MMOL/L (ref 10–20)
ANTIBODY SCREEN: NORMAL
APPEARANCE UR: ABNORMAL
APTT PPP: 32 SECONDS (ref 27–38)
AST SERPL W P-5'-P-CCNC: 30 U/L (ref 9–39)
BASOPHILS # BLD AUTO: 0.03 X10*3/UL (ref 0–0.1)
BASOPHILS NFR BLD AUTO: 0.4 %
BILIRUB SERPL-MCNC: 0.8 MG/DL (ref 0–1.2)
BILIRUB UR STRIP.AUTO-MCNC: NEGATIVE MG/DL
BUN SERPL-MCNC: 22 MG/DL (ref 6–23)
CALCIUM SERPL-MCNC: 9.6 MG/DL (ref 8.6–10.3)
CHLORIDE SERPL-SCNC: 103 MMOL/L (ref 98–107)
CHOLEST SERPL-MCNC: 102 MG/DL (ref 0–199)
CHOLESTEROL/HDL RATIO: 2.8
CO2 SERPL-SCNC: 29 MMOL/L (ref 21–32)
COLOR UR: YELLOW
CREAT SERPL-MCNC: 0.96 MG/DL (ref 0.5–1.3)
EGFRCR SERPLBLD CKD-EPI 2021: 82 ML/MIN/1.73M*2
EOSINOPHIL # BLD AUTO: 0.42 X10*3/UL (ref 0–0.4)
EOSINOPHIL NFR BLD AUTO: 5.4 %
ERYTHROCYTE [DISTWIDTH] IN BLOOD BY AUTOMATED COUNT: 14.1 % (ref 11.5–14.5)
FIBRINOGEN PPP-MCNC: 264 MG/DL (ref 200–400)
GLUCOSE SERPL-MCNC: 67 MG/DL (ref 74–99)
GLUCOSE UR STRIP.AUTO-MCNC: NEGATIVE MG/DL
HCT VFR BLD AUTO: 43.3 % (ref 41–52)
HDLC SERPL-MCNC: 36.2 MG/DL
HGB BLD-MCNC: 14.2 G/DL (ref 13.5–17.5)
IMM GRANULOCYTES # BLD AUTO: 0.02 X10*3/UL (ref 0–0.5)
IMM GRANULOCYTES NFR BLD AUTO: 0.3 % (ref 0–0.9)
INR PPP: 1 (ref 0.9–1.1)
KETONES UR STRIP.AUTO-MCNC: NEGATIVE MG/DL
LDLC SERPL CALC-MCNC: 40 MG/DL
LEUKOCYTE ESTERASE UR QL STRIP.AUTO: NEGATIVE
LYMPHOCYTES # BLD AUTO: 1.71 X10*3/UL (ref 0.8–3)
LYMPHOCYTES NFR BLD AUTO: 21.8 %
MCH RBC QN AUTO: 30.7 PG (ref 26–34)
MCHC RBC AUTO-ENTMCNC: 32.8 G/DL (ref 32–36)
MCV RBC AUTO: 94 FL (ref 80–100)
MONOCYTES # BLD AUTO: 0.86 X10*3/UL (ref 0.05–0.8)
MONOCYTES NFR BLD AUTO: 11 %
NEUTROPHILS # BLD AUTO: 4.8 X10*3/UL (ref 1.6–5.5)
NEUTROPHILS NFR BLD AUTO: 61.1 %
NITRITE UR QL STRIP.AUTO: NEGATIVE
NON HDL CHOLESTEROL: 66 MG/DL (ref 0–149)
NRBC BLD-RTO: 0 /100 WBCS (ref 0–0)
PH UR STRIP.AUTO: 6 [PH]
PLATELET # BLD AUTO: 152 X10*3/UL (ref 150–450)
POTASSIUM SERPL-SCNC: 4.9 MMOL/L (ref 3.5–5.3)
PROT SERPL-MCNC: 6.8 G/DL (ref 6.4–8.2)
PROT UR STRIP.AUTO-MCNC: NEGATIVE MG/DL
PROTHROMBIN TIME: 11.6 SECONDS (ref 9.8–12.8)
RBC # BLD AUTO: 4.63 X10*6/UL (ref 4.5–5.9)
RBC # UR STRIP.AUTO: ABNORMAL /UL
RBC #/AREA URNS AUTO: >20 /HPF
RH FACTOR (ANTIGEN D): NORMAL
SODIUM SERPL-SCNC: 138 MMOL/L (ref 136–145)
SP GR UR STRIP.AUTO: 1.01
TRIGL SERPL-MCNC: 130 MG/DL (ref 0–149)
UROBILINOGEN UR STRIP.AUTO-MCNC: <2 MG/DL
VLDL: 26 MG/DL (ref 0–40)
WBC # BLD AUTO: 7.8 X10*3/UL (ref 4.4–11.3)
WBC #/AREA URNS AUTO: ABNORMAL /HPF

## 2024-01-16 PROCEDURE — 80061 LIPID PANEL: CPT

## 2024-01-16 PROCEDURE — 99205 OFFICE O/P NEW HI 60 MIN: CPT | Performed by: NURSE PRACTITIONER

## 2024-01-16 PROCEDURE — 80053 COMPREHEN METABOLIC PANEL: CPT

## 2024-01-16 PROCEDURE — 83036 HEMOGLOBIN GLYCOSYLATED A1C: CPT

## 2024-01-16 PROCEDURE — 85610 PROTHROMBIN TIME: CPT

## 2024-01-16 PROCEDURE — 85730 THROMBOPLASTIN TIME PARTIAL: CPT

## 2024-01-16 PROCEDURE — 36415 COLL VENOUS BLD VENIPUNCTURE: CPT

## 2024-01-16 PROCEDURE — 85384 FIBRINOGEN ACTIVITY: CPT

## 2024-01-16 PROCEDURE — 86901 BLOOD TYPING SEROLOGIC RH(D): CPT

## 2024-01-16 PROCEDURE — 93005 ELECTROCARDIOGRAM TRACING: CPT

## 2024-01-16 PROCEDURE — 87081 CULTURE SCREEN ONLY: CPT | Mod: PARLAB

## 2024-01-16 PROCEDURE — 85025 COMPLETE CBC W/AUTO DIFF WBC: CPT

## 2024-01-16 PROCEDURE — 71046 X-RAY EXAM CHEST 2 VIEWS: CPT

## 2024-01-16 PROCEDURE — 93010 ELECTROCARDIOGRAM REPORT: CPT | Performed by: INTERNAL MEDICINE

## 2024-01-16 PROCEDURE — 86900 BLOOD TYPING SEROLOGIC ABO: CPT

## 2024-01-16 PROCEDURE — 81001 URINALYSIS AUTO W/SCOPE: CPT

## 2024-01-16 PROCEDURE — 86850 RBC ANTIBODY SCREEN: CPT

## 2024-01-16 PROCEDURE — 86920 COMPATIBILITY TEST SPIN: CPT

## 2024-01-16 ASSESSMENT — DUKE ACTIVITY SCORE INDEX (DASI)
CAN YOU TAKE CARE OF YOURSELF (EAT, DRESS, BATHE, OR USE TOILET): YES
CAN YOU DO YARD WORK LIKE RAKING LEAVES, WEEDING OR PUSHING A MOWER: NO
CAN YOU WALK A BLOCK OR TWO ON LEVEL GROUND: YES
TOTAL_SCORE: 18.95
CAN YOU PARTICIPATE IN STRENOUS SPORTS LIKE SWIMMING, SINGLES TENNIS, FOOTBALL, BASKETBALL, OR SKIING: NO
CAN YOU DO MODERATE WORK AROUND THE HOUSE LIKE VACUUMING, SWEEPING FLOORS OR CARRYING GROCERIES: YES
DASI METS SCORE: 5.1
CAN YOU RUN A SHORT DISTANCE: NO
CAN YOU WALK INDOORS, SUCH AS AROUND YOUR HOUSE: YES
CAN YOU HAVE SEXUAL RELATIONS: NO
CAN YOU CLIMB A FLIGHT OF STAIRS OR WALK UP A HILL: YES
CAN YOU PARTICIPATE IN MODERATE RECREATIONAL ACTIVITIES LIKE GOLF, BOWLING, DANCING, DOUBLES TENNIS OR THROWING A BASEBALL OR FOOTBALL: NO
CAN YOU DO LIGHT WORK AROUND THE HOUSE LIKE DUSTING OR WASHING DISHES: YES
CAN YOU DO HEAVY WORK AROUND THE HOUSE LIKE SCRUBBING FLOORS OR LIFTING AND MOVING HEAVY FURNITURE: NO

## 2024-01-16 NOTE — PREPROCEDURE INSTRUCTIONS
Medication List            Accurate as of January 16, 2024  2:38 PM. Always use your most recent med list.                Ambien 5 mg tablet  Generic drug: zolpidem  Medication Adjustments for Surgery: Continue until night before surgery     atorvastatin 80 mg tablet  Commonly known as: Lipitor  Medication Adjustments for Surgery: Continue until night before surgery     calcium carbonate-vitamin D3 600 mg-5 mcg (200 unit) tablet  Medication Adjustments for Surgery: Continue until night before surgery     cetirizine 10 mg capsule  Commonly known as: ZYRTEC  Medication Adjustments for Surgery: Continue until night before surgery     cyanocobalamin 500 mcg tablet  Commonly known as: Vitamin B-12  Medication Adjustments for Surgery: Continue until night before surgery     escitalopram 10 mg tablet  Commonly known as: Lexapro  Medication Adjustments for Surgery: Take morning of surgery with sip of water, no other fluids     melatonin 10 mg capsule  Medication Adjustments for Surgery: Continue until night before surgery     metoprolol succinate XL 25 mg 24 hr tablet  Commonly known as: Toprol-XL  Medication Adjustments for Surgery: Take morning of surgery with sip of water, no other fluids     omeprazole 40 mg DR capsule  Commonly known as: PriLOSEC  Medication Adjustments for Surgery: Take morning of surgery with sip of water, no other fluids     ticagrelor 90 mg tablet  Commonly known as: Brilinta  Medication Adjustments for Surgery: Stop 7 days before surgery     Vitamin D3 5,000 Units tablet  Generic drug: cholecalciferol  Medication Adjustments for Surgery: Continue until night before surgery                 PRE-OPERATIVE INSTRUCTIONS FOR SURGERY    *Do not eat anything after midnight the night of surgery.  This includes food of any kind (including hard candy, cough drops, mints and gum) and beverages (including coffee and soda).  You may drink up to one 8 ounce glass of water up until three hours before your  scheduled surgery time.    *One of our staff members will call you ONE business day before your surgery, between 11a-2p  To let you know the time to arrive.  If you have no received a call by 2 pm, call [phone]  *When you arrive at the hospital-->GO TO Registration on the ground floor  *Stop smoking 24 hours prior to surgery.  No Marijuana, CBD Oil or Vaping for 48 hours  *No alcohol 24 hours prior to surgery  *You will need a responsible adult to drive you home  -No acrylic nails or nail polish on at least one fingernail, NO polish on toes for foot surgery  -You may be asked to remove your dentures, partial plate, eyeglasses or contact lenses before going to surgery.  Please bring a case for these items.  -Body piercings need to be removed.  Jewelry and valuables should be left at home.  -Put on loose,  comfortable, clean clothing, that will accommodate bandages    HOME PREOPERATIVE ANTIBACTERIAL SHOWER:  -This shower is a way of cleaning the skin with germ killing solution before surgery.  The solution contains Chorhexidine (CHG).   Let your Dr. Know if you are allergic to Chlorhexidine.    NIGHT BEFORE SURGERY:    -Take a normal shower and wash your hair  -Turn OFF water to avoid rinsing the antibacterial skin cleanser off (CHG).  -Apply the CHG cleanser to a clean and wet washcloth.  Lather your entire body from the neck down.    -DO NOT USE ON THE HEAD, FACE, or GENITALS.  -RINSE immediately if CHG is in contact with your eyes, ears or mouth  -Gently wash your body.  Have the CHG cleanser stay on your skin for 3 MINUTES.  -After 3 minutes, turn on water and rinse the CHG cleanser off your body completely  -DO NOT WASH with regular soap after using CHG.  -PAT DRY with a clean fresh towel  -DO NOT apply any cosme, deodorants or lotions  -Dress in clean night cloths  **CHG cleanser will cause stains to fabrics if you wash them with bleach after use.     DAY OF SURGERY:  -Take shower-->JUST GET WET.  Turn OFF  water.  -REPEAT the CHG cleanse as you did the night before.  -PAT DRY-->           NPO Instructions:    Do not eat any food after midnight the night before your surgery/procedure.    Additional Instructions:     Day of Surgery:  Wear  comfortable loose fitting clothing  Do not use moisturizers, creams, lotions or perfume  All jewelry and valuables should be left at home

## 2024-01-16 NOTE — CPM/PAT H&P
"CPM/PAT Evaluation       Name: Jerel Drummond (Jerel Drummond)  /Age: 1950/73 y.o.     In-Person       Chief Complaint:     73 yr old male with c/o a leaky valve.    He c/o a leaky valve since , \"d/t exposure to agent orange.\"  He was found to have a murmur in .  He had been monitored with q-6 months to yearly Echo and followed by cardiology at [facility]    He c/o having MI  and states at that time, his valve was stable    Recently, he c/o increasing dizziness with activity, with lethargy, taking daily naps  He c/o increasing SOB/ALBARRAN, and states his legs cramp up with ankle swelling  He c/o intermittent palpitations, worse later in the day---> not aware of triggers    Today, he denies chest pain    Denies any cardiac or respiratory symptoms.  Denies any past issues with anesthesia.    AORTIC VALVE REPLACEMENT WITH TISSUE GRAFT is Scheduled on 2024 with Dr. Eddy    Vitals:    24 1426   BP: (!) 142/94   Pulse: 71   Resp: 18   Temp: 36.1 °C (97 °F)   SpO2: 97%          Past Medical History:   Diagnosis Date    Aortic stenosis     Restrepo's esophagus     Cataract     Cerebral vascular accident (CMS/HCC)     COPD (chronic obstructive pulmonary disease) (CMS/HCC)     Coronary artery disease     Depression     Fracture, ribs     GERD (gastroesophageal reflux disease)     HTN (hypertension)     Hyperlipidemia     Insomnia     Myocardial infarction (CMS/HCC)     Neuropathy     Sleep apnea        Past Surgical History:   Procedure Laterality Date    CATARACT EXTRACTION      HERNIA REPAIR      KNEE ARTHROPLASTY      RETINAL DETACHMENT SURGERY      RIB FRACTURE SURGERY         Patient Sexual activity questions deferred to the physician.    Family History   Problem Relation Name Age of Onset    Stomach cancer Mother         Allergies   Allergen Reactions    Chlorine Swelling    Ciprofloxacin Unknown    Codeine Itching    Meloxicam Other    Meperidine Other    Clindamycin Palpitations " "      Prior to Admission medications    Medication Sig Start Date End Date Taking? Authorizing Provider   atorvastatin (Lipitor) 80 mg tablet Take 1 tablet (80 mg) by mouth once daily. 10/16/23   Historical Provider, MD   calcium carbonate-vitamin D3 600 mg-5 mcg (200 unit) tablet Take 1 tablet by mouth twice a day.    Historical Provider, MD   cetirizine (ZYRTEC) 10 mg capsule Take 1 capsule (10 mg) by mouth once daily.    Historical Provider, MD   cholecalciferol (Vitamin D3) 5,000 Units tablet Take 1 tablet (5,000 Units) by mouth once daily.    Historical Provider, MD   cyanocobalamin (Vitamin B-12) 500 mcg tablet Take 1 tablet (500 mcg) by mouth. 8/14/23   Historical Provider, MD   escitalopram (Lexapro) 10 mg tablet Take 1 tablet (10 mg) by mouth 2 times a day. 6/14/23   Historical Provider, MD   melatonin 10 mg capsule Take 1 capsule (10 mg) by mouth once daily at bedtime.    Historical Provider, MD   metoprolol succinate XL (Toprol-XL) 25 mg 24 hr tablet Take 1 tablet (25 mg) by mouth once daily. 3/2/23   Historical Provider, MD   omeprazole (PriLOSEC) 40 mg DR capsule Take 20 mg by mouth once daily in the morning. Take before meals. 12/13/23   Historical Provider, MD   ticagrelor (Brilinta) 90 mg tablet Take 1 tablet (90 mg) by mouth.  TAKE ONE TABLET BY MOUTH TWICE A DAY FOR ACUTE SYNDROME OF THE HEART DATE TICAGRELOR THERAPY STARTED: DEC 10,2022 DATE OF TICAGRELOR REASSESSMENT: DEC 10,2023 10/16/23   Historical Provider, MD   zolpidem (Ambien) 5 mg tablet Take 1 tablet (5 mg) by mouth once daily at bedtime.    Historical Provider, MD          Review of Systems  Constitutional: NO F, chills, or sweats  Eyes: no blurred vision or visual disturbance, glasses  ENT: denies congestion, sore throat, difficulty hearing  Cardiovascular: see HPI  Respiratory: + ALBARRAN/ SOB, + cough every morning,  hears \"crackling noise\" and no wheezing  Gastrointestinal: + blood stool, needs to have a colonoscopy/ + polyps in the " "past, no abdominal pain, no N/V, no blood in stools  Genitourinary: weaker stream, + nocturia, occasional urgency, had blood urine with specimen today, occasional burning with urination in the past,  no urinary frequency, no urinary hesitancy   Musculoskeletal: no arthralgias,  no back pain and no myalgias.   Integumentary: Buttocks--> \"has herpes or shingles\" with burning and itching since 1997, applies cream he uses of athlete's foot or applies ice, no new skin lesions and no rashes.   Neurological: no difficulty walking, no headache, no limb weakness, no numbness and no tingling.   Endocrine: no recent weight gain and no recent weight loss.   Hematologic/Lymphatic: no tendency for easy bruising and no swollen glands.      Physical Exam  Constitutional:       Appearance: Normal appearance.   Cardiovascular:      Rate and Rhythm: Normal rate and regular rhythm.      Heart sounds: Murmur heard.      Systolic murmur is present with a grade of 3/6.   Pulmonary:      Effort: Pulmonary effort is normal.      Breath sounds: Normal breath sounds.   Abdominal:      General: Bowel sounds are normal.      Palpations: Abdomen is soft.   Musculoskeletal:         General: Normal range of motion.      Cervical back: Normal range of motion.      Right lower leg: Edema present.      Left lower leg: Edema present.   Skin:     General: Skin is warm and dry.      Findings: Erythema, lesion and rash present. Rash is crusting, macular, scaling and urticarial.             Comments: Scattered rash to bilateral buttocks, raised and scaling, irregular border/ shape, + erythema, no drainage   Neurological:      Mental Status: He is alert and oriented to person, place, and time.          PAT AIRWAY:   Airway:     Mallampati::  III    TM distance::  <3 FB    Neck ROM::  Full  normal        Visit Vitals  BP (!) 142/94   Pulse 71   Temp 36.1 °C (97 °F) (Tympanic)   Resp 18       DASI Risk Score      Flowsheet Row Most Recent Value   DASI SCORE " 18.95   METS Score (Will be calculated only when all the questions are answered) 5.1          Caprini DVT Assessment    No data to display       Modified Frailty Index    No data to display       CHADS2 Stroke Risk  Current as of 12 minutes ago        N/A 3 - 100%: High Risk   2 - 3%: Medium Risk   0 - 2%: Low Risk     Last Change: N/A          This score determines the patient's risk of having a stroke if the patient has atrial fibrillation.        This score is not applicable to this patient. Components are not calculated.          Revised Cardiac Risk Index    No data to display       Apfel Simplified Score    No data to display       Risk Analysis Index Results This Encounter    No data found in the last 1 encounters.       Stop Bang Score      Flowsheet Row Most Recent Value   Do you snore loudly? 0   Do you often feel tired or fatigued after your sleep? 1   Has anyone ever observed you stop breathing in your sleep? 1   Do you have or are you being treated for high blood pressure? 1   Recent BMI (Calculated) 30.5   Is BMI greater than 35 kg/m2? 0=No   Age older than 50 years old? 1=Yes   Is your neck circumference greater than 17 inches (Male) or 16 inches (Female)? 1   Gender - Male 1=Yes   STOP-BANG Total Score 6            Assessment and Plan:     Pulmonary: H/O COPD  CXR results pending    -  Bloody urine with burning today- UA results pending    Integument-  + rash to bilateral buttocks, dry with no drainage at this time

## 2024-01-16 NOTE — H&P (VIEW-ONLY)
"CPM/PAT Evaluation       Name: Jerel Drummond (Jerel Drummond)  /Age: 1950/73 y.o.     In-Person       Chief Complaint:     73 yr old male with c/o a leaky valve.    He c/o a leaky valve since , \"d/t exposure to agent orange.\"  He was found to have a murmur in .  He had been monitored with q-6 months to yearly Echo and followed by cardiology at [facility]    He c/o having MI  and states at that time, his valve was stable    Recently, he c/o increasing dizziness with activity, with lethargy, taking daily naps  He c/o increasing SOB/ALBARRAN, and states his legs cramp up with ankle swelling  He c/o intermittent palpitations, worse later in the day---> not aware of triggers    Today, he denies chest pain    Denies any cardiac or respiratory symptoms.  Denies any past issues with anesthesia.    AORTIC VALVE REPLACEMENT WITH TISSUE GRAFT is Scheduled on 2024 with Dr. Eddy    Vitals:    24 1426   BP: (!) 142/94   Pulse: 71   Resp: 18   Temp: 36.1 °C (97 °F)   SpO2: 97%          Past Medical History:   Diagnosis Date    Aortic stenosis     Restrepo's esophagus     Cataract     Cerebral vascular accident (CMS/HCC)     COPD (chronic obstructive pulmonary disease) (CMS/HCC)     Coronary artery disease     Depression     Fracture, ribs     GERD (gastroesophageal reflux disease)     HTN (hypertension)     Hyperlipidemia     Insomnia     Myocardial infarction (CMS/HCC)     Neuropathy     Sleep apnea        Past Surgical History:   Procedure Laterality Date    CATARACT EXTRACTION      HERNIA REPAIR      KNEE ARTHROPLASTY      RETINAL DETACHMENT SURGERY      RIB FRACTURE SURGERY         Patient Sexual activity questions deferred to the physician.    Family History   Problem Relation Name Age of Onset    Stomach cancer Mother         Allergies   Allergen Reactions    Chlorine Swelling    Ciprofloxacin Unknown    Codeine Itching    Meloxicam Other    Meperidine Other    Clindamycin Palpitations " "      Prior to Admission medications    Medication Sig Start Date End Date Taking? Authorizing Provider   atorvastatin (Lipitor) 80 mg tablet Take 1 tablet (80 mg) by mouth once daily. 10/16/23   Historical Provider, MD   calcium carbonate-vitamin D3 600 mg-5 mcg (200 unit) tablet Take 1 tablet by mouth twice a day.    Historical Provider, MD   cetirizine (ZYRTEC) 10 mg capsule Take 1 capsule (10 mg) by mouth once daily.    Historical Provider, MD   cholecalciferol (Vitamin D3) 5,000 Units tablet Take 1 tablet (5,000 Units) by mouth once daily.    Historical Provider, MD   cyanocobalamin (Vitamin B-12) 500 mcg tablet Take 1 tablet (500 mcg) by mouth. 8/14/23   Historical Provider, MD   escitalopram (Lexapro) 10 mg tablet Take 1 tablet (10 mg) by mouth 2 times a day. 6/14/23   Historical Provider, MD   melatonin 10 mg capsule Take 1 capsule (10 mg) by mouth once daily at bedtime.    Historical Provider, MD   metoprolol succinate XL (Toprol-XL) 25 mg 24 hr tablet Take 1 tablet (25 mg) by mouth once daily. 3/2/23   Historical Provider, MD   omeprazole (PriLOSEC) 40 mg DR capsule Take 20 mg by mouth once daily in the morning. Take before meals. 12/13/23   Historical Provider, MD   ticagrelor (Brilinta) 90 mg tablet Take 1 tablet (90 mg) by mouth.  TAKE ONE TABLET BY MOUTH TWICE A DAY FOR ACUTE SYNDROME OF THE HEART DATE TICAGRELOR THERAPY STARTED: DEC 10,2022 DATE OF TICAGRELOR REASSESSMENT: DEC 10,2023 10/16/23   Historical Provider, MD   zolpidem (Ambien) 5 mg tablet Take 1 tablet (5 mg) by mouth once daily at bedtime.    Historical Provider, MD          Review of Systems  Constitutional: NO F, chills, or sweats  Eyes: no blurred vision or visual disturbance, glasses  ENT: denies congestion, sore throat, difficulty hearing  Cardiovascular: see HPI  Respiratory: + ALBARRAN/ SOB, + cough every morning,  hears \"crackling noise\" and no wheezing  Gastrointestinal: + blood stool, needs to have a colonoscopy/ + polyps in the " "past, no abdominal pain, no N/V, no blood in stools  Genitourinary: weaker stream, + nocturia, occasional urgency, had blood urine with specimen today, occasional burning with urination in the past,  no urinary frequency, no urinary hesitancy   Musculoskeletal: no arthralgias,  no back pain and no myalgias.   Integumentary: Buttocks--> \"has herpes or shingles\" with burning and itching since 1997, applies cream he uses of athlete's foot or applies ice, no new skin lesions and no rashes.   Neurological: no difficulty walking, no headache, no limb weakness, no numbness and no tingling.   Endocrine: no recent weight gain and no recent weight loss.   Hematologic/Lymphatic: no tendency for easy bruising and no swollen glands.      Physical Exam  Constitutional:       Appearance: Normal appearance.   Cardiovascular:      Rate and Rhythm: Normal rate and regular rhythm.      Heart sounds: Murmur heard.      Systolic murmur is present with a grade of 3/6.   Pulmonary:      Effort: Pulmonary effort is normal.      Breath sounds: Normal breath sounds.   Abdominal:      General: Bowel sounds are normal.      Palpations: Abdomen is soft.   Musculoskeletal:         General: Normal range of motion.      Cervical back: Normal range of motion.      Right lower leg: Edema present.      Left lower leg: Edema present.   Skin:     General: Skin is warm and dry.      Findings: Erythema, lesion and rash present. Rash is crusting, macular, scaling and urticarial.             Comments: Scattered rash to bilateral buttocks, raised and scaling, irregular border/ shape, + erythema, no drainage   Neurological:      Mental Status: He is alert and oriented to person, place, and time.          PAT AIRWAY:   Airway:     Mallampati::  III    TM distance::  <3 FB    Neck ROM::  Full  normal        Visit Vitals  BP (!) 142/94   Pulse 71   Temp 36.1 °C (97 °F) (Tympanic)   Resp 18       DASI Risk Score      Flowsheet Row Most Recent Value   DASI SCORE " 18.95   METS Score (Will be calculated only when all the questions are answered) 5.1          Caprini DVT Assessment    No data to display       Modified Frailty Index    No data to display       CHADS2 Stroke Risk  Current as of 12 minutes ago        N/A 3 - 100%: High Risk   2 - 3%: Medium Risk   0 - 2%: Low Risk     Last Change: N/A          This score determines the patient's risk of having a stroke if the patient has atrial fibrillation.        This score is not applicable to this patient. Components are not calculated.          Revised Cardiac Risk Index    No data to display       Apfel Simplified Score    No data to display       Risk Analysis Index Results This Encounter    No data found in the last 1 encounters.       Stop Bang Score      Flowsheet Row Most Recent Value   Do you snore loudly? 0   Do you often feel tired or fatigued after your sleep? 1   Has anyone ever observed you stop breathing in your sleep? 1   Do you have or are you being treated for high blood pressure? 1   Recent BMI (Calculated) 30.5   Is BMI greater than 35 kg/m2? 0=No   Age older than 50 years old? 1=Yes   Is your neck circumference greater than 17 inches (Male) or 16 inches (Female)? 1   Gender - Male 1=Yes   STOP-BANG Total Score 6            Assessment and Plan:     Pulmonary: H/O COPD  CXR results pending    -  Bloody urine with burning today- UA results pending    Integument-  + rash to bilateral buttocks, dry with no drainage at this time

## 2024-01-17 ENCOUNTER — DOCUMENTATION (OUTPATIENT)
Dept: CARDIOVASCULAR ICU | Facility: HOSPITAL | Age: 74
End: 2024-01-17
Payer: MEDICARE

## 2024-01-17 LAB
EST. AVERAGE GLUCOSE BLD GHB EST-MCNC: 117 MG/DL
HBA1C MFR BLD: 5.7 %

## 2024-01-17 NOTE — NURSING NOTE
Telephone call this morning with patient. AVR scheduled for January 19, 2024 with Dr. Eddy.   Heart Surgery pre-op teaching education given to patient by Nurse Navigator with a good understanding.  Spouse: Darby [phone]  Topics discussed:      1.  What to expect day of surgery and post-surgery  2.  CHG shower  3.  Intubation/Extubation  4. Chest tubes  5. Pain management   6.  Hospital medication  7. Daily weight education   8. Nutrition education   9.  Physical therapy   10.  [facility] information given, [place].     Patient verbalized understanding of open heart education, all questions answered.

## 2024-01-18 ENCOUNTER — ANESTHESIA EVENT (OUTPATIENT)
Dept: OPERATING ROOM | Facility: HOSPITAL | Age: 74
DRG: 219 | End: 2024-01-18
Payer: MEDICARE

## 2024-01-18 PROBLEM — Z86.69 HISTORY OF SLEEP APNEA: Status: ACTIVE | Noted: 2018-04-19

## 2024-01-18 PROBLEM — F43.10 POST-TRAUMATIC STRESS DISORDER, UNSPECIFIED: Status: ACTIVE | Noted: 2024-01-18

## 2024-01-18 LAB — STAPHYLOCOCCUS SPEC CULT: NORMAL

## 2024-01-18 NOTE — ANESTHESIA PREPROCEDURE EVALUATION
Patient: Jerel Drummond    Procedure Information       Date/Time: 01/19/24 0730    Procedure: AORTIC VALVE REPLACEMENT WITH TISSUE GRAFT - Surgery start time of 0730.  Surgery length of 5 hours.    Location: [facility] OR  / [facility] OR    Surgeons: Jakub Eddy MD            Relevant Problems   Cardiovascular   (+) Calcific aortic stenosis   (+) Essential (primary) hypertension   (+) Hypertension   (+) Mitral stenosis   (+) Nonrheumatic aortic valve stenosis      GI   (+) Gastroesophageal reflux disease      Neuro/Psych   (+) Depressive disorder   (+) Post-traumatic stress disorder, unspecified   (+) Posttraumatic stress disorder      Pulmonary   (+) Chronic obstructive lung disease (CMS/HCC)      Infectious Disease   (+) Herpes simplex without complication   (+) Onychomycosis of toenail       Clinical information reviewed:      Problems              NPO Detail:  No data recorded     Physical Exam    Airway  Mallampati: III  TM distance: >3 FB  Neck ROM: full     Cardiovascular - normal exam  Rhythm: regular  Rate: normal     Dental - normal exam     Pulmonary - normal exam     Abdominal            Anesthesia Plan    History of general anesthesia?: yes  History of complications of general anesthesia?: no    ASA 3     general and regional   (A-LINE AND SWAN CATH PLACEMENT. ERIKA MONITORING)  The patient is a current smoker.  Patient was previously instructed to abstain from smoking on day of procedure.  Patient did not smoke on day of procedure.  Education provided regarding risk of obstructive sleep apnea.  intravenous induction   Postoperative administration of opioids is intended.  Trial extubation is planned.  Anesthetic plan and risks discussed with patient.  Use of blood products discussed with patient who consented to blood products.    Plan discussed with CRNA.

## 2024-01-19 ENCOUNTER — HOSPITAL ENCOUNTER (OUTPATIENT)
Dept: CARDIOLOGY | Facility: HOSPITAL | Age: 74
Discharge: HOME | DRG: 219 | End: 2024-01-19
Payer: MEDICARE

## 2024-01-19 ENCOUNTER — ANESTHESIA (OUTPATIENT)
Dept: OPERATING ROOM | Facility: HOSPITAL | Age: 74
DRG: 219 | End: 2024-01-19
Payer: MEDICARE

## 2024-01-19 ENCOUNTER — HOSPITAL ENCOUNTER (INPATIENT)
Facility: HOSPITAL | Age: 74
LOS: 5 days | Discharge: HOME | DRG: 219 | End: 2024-01-24
Attending: THORACIC SURGERY (CARDIOTHORACIC VASCULAR SURGERY) | Admitting: NURSE PRACTITIONER
Payer: MEDICARE

## 2024-01-19 ENCOUNTER — APPOINTMENT (OUTPATIENT)
Dept: RADIOLOGY | Facility: HOSPITAL | Age: 74
DRG: 219 | End: 2024-01-19
Payer: MEDICARE

## 2024-01-19 ENCOUNTER — DOCUMENTATION (OUTPATIENT)
Dept: OPERATING ROOM | Facility: HOSPITAL | Age: 74
End: 2024-01-19

## 2024-01-19 ENCOUNTER — HOSPITAL ENCOUNTER (EMERGENCY)
Dept: OPERATING ROOM | Facility: HOSPITAL | Age: 74
Discharge: HOME | DRG: 219 | End: 2024-01-19
Payer: MEDICARE

## 2024-01-19 ENCOUNTER — DOCUMENTATION (OUTPATIENT)
Dept: RESEARCH | Age: 74
End: 2024-01-19
Payer: MEDICARE

## 2024-01-19 DIAGNOSIS — I35.0 CALCIFIC AORTIC STENOSIS: ICD-10-CM

## 2024-01-19 DIAGNOSIS — I10 ESSENTIAL (PRIMARY) HYPERTENSION: ICD-10-CM

## 2024-01-19 DIAGNOSIS — K59.03 DRUG-INDUCED CONSTIPATION: ICD-10-CM

## 2024-01-19 DIAGNOSIS — I35.0 AORTIC VALVE STENOSIS, CRITICAL: ICD-10-CM

## 2024-01-19 DIAGNOSIS — E87.79 OTHER HYPERVOLEMIA: ICD-10-CM

## 2024-01-19 DIAGNOSIS — Z01.818 PRE-OP TESTING: ICD-10-CM

## 2024-01-19 DIAGNOSIS — I24.9 ACS (ACUTE CORONARY SYNDROME) (MULTI): ICD-10-CM

## 2024-01-19 DIAGNOSIS — I35.2 NONRHEUMATIC AORTIC (VALVE) STENOSIS WITH INSUFFICIENCY: ICD-10-CM

## 2024-01-19 DIAGNOSIS — I25.10 CAD S/P PERCUTANEOUS CORONARY ANGIOPLASTY: ICD-10-CM

## 2024-01-19 DIAGNOSIS — I35.0 NONRHEUMATIC AORTIC VALVE STENOSIS: ICD-10-CM

## 2024-01-19 DIAGNOSIS — Z95.2 S/P AVR: Primary | ICD-10-CM

## 2024-01-19 DIAGNOSIS — G89.18 POST-OP PAIN: ICD-10-CM

## 2024-01-19 DIAGNOSIS — Z98.61 CAD S/P PERCUTANEOUS CORONARY ANGIOPLASTY: ICD-10-CM

## 2024-01-19 LAB
ALBUMIN SERPL BCP-MCNC: 3.4 G/DL (ref 3.4–5)
ANION GAP BLDA CALCULATED.4IONS-SCNC: 10 MMO/L (ref 10–25)
ANION GAP BLDA CALCULATED.4IONS-SCNC: 10 MMO/L (ref 10–25)
ANION GAP BLDA CALCULATED.4IONS-SCNC: 11 MMO/L (ref 10–25)
ANION GAP BLDA CALCULATED.4IONS-SCNC: 13 MMO/L (ref 10–25)
ANION GAP BLDA CALCULATED.4IONS-SCNC: 5 MMO/L (ref 10–25)
ANION GAP BLDA CALCULATED.4IONS-SCNC: 6 MMO/L (ref 10–25)
ANION GAP BLDA CALCULATED.4IONS-SCNC: 6 MMO/L (ref 10–25)
ANION GAP BLDA CALCULATED.4IONS-SCNC: 7 MMO/L (ref 10–25)
ANION GAP BLDA CALCULATED.4IONS-SCNC: 7 MMO/L (ref 10–25)
ANION GAP BLDA CALCULATED.4IONS-SCNC: 8 MMO/L (ref 10–25)
ANION GAP BLDA CALCULATED.4IONS-SCNC: 9 MMO/L (ref 10–25)
ANION GAP SERPL CALC-SCNC: 10 MMOL/L (ref 10–20)
APTT PPP: 37 SECONDS (ref 27–38)
APTT PPP: 37 SECONDS (ref 27–38)
BASE EXCESS BLDA CALC-SCNC: -0.5 MMOL/L (ref -2–3)
BASE EXCESS BLDA CALC-SCNC: -2.1 MMOL/L (ref -2–3)
BASE EXCESS BLDA CALC-SCNC: 0.3 MMOL/L (ref -2–3)
BASE EXCESS BLDA CALC-SCNC: 0.5 MMOL/L (ref -2–3)
BASE EXCESS BLDA CALC-SCNC: 0.9 MMOL/L (ref -2–3)
BASE EXCESS BLDA CALC-SCNC: 1.2 MMOL/L (ref -2–3)
BASE EXCESS BLDA CALC-SCNC: 1.5 MMOL/L (ref -2–3)
BASE EXCESS BLDA CALC-SCNC: 2.8 MMOL/L (ref -2–3)
BASE EXCESS BLDA CALC-SCNC: 3.9 MMOL/L (ref -2–3)
BASE EXCESS BLDA CALC-SCNC: 4.7 MMOL/L (ref -2–3)
BASE EXCESS BLDA CALC-SCNC: 7.5 MMOL/L (ref -2–3)
BASE EXCESS BLDMV CALC-SCNC: 0.2 MMOL/L (ref -2–3)
BASE EXCESS BLDV CALC-SCNC: -0.2 MMOL/L (ref -2–3)
BLOOD EXPIRATION DATE: NORMAL
BLOOD EXPIRATION DATE: NORMAL
BODY TEMPERATURE: 37 DEGREES CELSIUS
BUN SERPL-MCNC: 18 MG/DL (ref 6–23)
CA-I BLD-SCNC: 1.2 MMOL/L (ref 1.1–1.33)
CA-I BLDA-SCNC: 1.05 MMOL/L (ref 1.1–1.33)
CA-I BLDA-SCNC: 1.08 MMOL/L (ref 1.1–1.33)
CA-I BLDA-SCNC: 1.09 MMOL/L (ref 1.1–1.33)
CA-I BLDA-SCNC: 1.13 MMOL/L (ref 1.1–1.33)
CA-I BLDA-SCNC: 1.15 MMOL/L (ref 1.1–1.33)
CA-I BLDA-SCNC: 1.15 MMOL/L (ref 1.1–1.33)
CA-I BLDA-SCNC: 1.16 MMOL/L (ref 1.1–1.33)
CA-I BLDA-SCNC: 1.18 MMOL/L (ref 1.1–1.33)
CA-I BLDA-SCNC: 1.2 MMOL/L (ref 1.1–1.33)
CA-I BLDA-SCNC: 1.23 MMOL/L (ref 1.1–1.33)
CA-I BLDA-SCNC: 1.46 MMOL/L (ref 1.1–1.33)
CALCIUM SERPL-MCNC: 8.4 MG/DL (ref 8.6–10.3)
CHLORIDE BLDA-SCNC: 103 MMOL/L (ref 98–107)
CHLORIDE BLDA-SCNC: 105 MMOL/L (ref 98–107)
CHLORIDE BLDA-SCNC: 106 MMOL/L (ref 98–107)
CHLORIDE BLDA-SCNC: 107 MMOL/L (ref 98–107)
CHLORIDE BLDA-SCNC: 108 MMOL/L (ref 98–107)
CHLORIDE BLDA-SCNC: 108 MMOL/L (ref 98–107)
CHLORIDE SERPL-SCNC: 109 MMOL/L (ref 98–107)
CO2 SERPL-SCNC: 25 MMOL/L (ref 21–32)
COHGB MFR BLDA: 1.4 %
CREAT SERPL-MCNC: 0.9 MG/DL (ref 0.5–1.3)
CRITICAL CALL TIME: 1053
CRITICAL CALL TIME: 1115
CRITICAL CALLED BY: ABNORMAL
CRITICAL CALLED BY: ABNORMAL
CRITICAL CALLED TO: ABNORMAL
CRITICAL CALLED TO: ABNORMAL
CRITICAL READ BACK: ABNORMAL
CRITICAL READ BACK: ABNORMAL
DISPENSE STATUS: NORMAL
DISPENSE STATUS: NORMAL
DO-HGB MFR BLDA: 0.8 % (ref 0–5)
EGFRCR SERPLBLD CKD-EPI 2021: 89 ML/MIN/1.73M*2
ERYTHROCYTE [DISTWIDTH] IN BLOOD BY AUTOMATED COUNT: 14.1 % (ref 11.5–14.5)
FIBRINOGEN PPP-MCNC: 173 MG/DL (ref 200–400)
FIBRINOGEN PPP-MCNC: 182 MG/DL (ref 200–400)
GLUCOSE BLD MANUAL STRIP-MCNC: 135 MG/DL (ref 74–99)
GLUCOSE BLD MANUAL STRIP-MCNC: 141 MG/DL (ref 74–99)
GLUCOSE BLD MANUAL STRIP-MCNC: 153 MG/DL (ref 74–99)
GLUCOSE BLDA-MCNC: 107 MG/DL (ref 74–99)
GLUCOSE BLDA-MCNC: 140 MG/DL (ref 74–99)
GLUCOSE BLDA-MCNC: 146 MG/DL (ref 74–99)
GLUCOSE BLDA-MCNC: 159 MG/DL (ref 74–99)
GLUCOSE BLDA-MCNC: 170 MG/DL (ref 74–99)
GLUCOSE BLDA-MCNC: 175 MG/DL (ref 74–99)
GLUCOSE BLDA-MCNC: 177 MG/DL (ref 74–99)
GLUCOSE BLDA-MCNC: 177 MG/DL (ref 74–99)
GLUCOSE BLDA-MCNC: 189 MG/DL (ref 74–99)
GLUCOSE BLDA-MCNC: 194 MG/DL (ref 74–99)
GLUCOSE BLDA-MCNC: 250 MG/DL (ref 74–99)
GLUCOSE SERPL-MCNC: 147 MG/DL (ref 74–99)
HCO3 BLDA-SCNC: 22.3 MMOL/L (ref 22–26)
HCO3 BLDA-SCNC: 24.2 MMOL/L (ref 22–26)
HCO3 BLDA-SCNC: 25.4 MMOL/L (ref 22–26)
HCO3 BLDA-SCNC: 25.4 MMOL/L (ref 22–26)
HCO3 BLDA-SCNC: 25.9 MMOL/L (ref 22–26)
HCO3 BLDA-SCNC: 27.1 MMOL/L (ref 22–26)
HCO3 BLDA-SCNC: 27.2 MMOL/L (ref 22–26)
HCO3 BLDA-SCNC: 27.4 MMOL/L (ref 22–26)
HCO3 BLDA-SCNC: 28.5 MMOL/L (ref 22–26)
HCO3 BLDA-SCNC: 29.2 MMOL/L (ref 22–26)
HCO3 BLDA-SCNC: 32 MMOL/L (ref 22–26)
HCO3 BLDMV-SCNC: 26.9 MMOL/L (ref 22–26)
HCO3 BLDV-SCNC: 26.4 MMOL/L (ref 22–26)
HCT VFR BLD AUTO: 35.1 % (ref 41–52)
HCT VFR BLD EST: 28 % (ref 41–52)
HCT VFR BLD EST: 29 % (ref 41–52)
HCT VFR BLD EST: 30 % (ref 41–52)
HCT VFR BLD EST: 32 % (ref 41–52)
HCT VFR BLD EST: 34 % (ref 41–52)
HCT VFR BLD EST: 35 % (ref 41–52)
HCT VFR BLD EST: 36 % (ref 41–52)
HCT VFR BLD EST: 38 % (ref 41–52)
HGB BLD-MCNC: 11.6 G/DL (ref 13.5–17.5)
HGB BLDA-MCNC: 10.1 G/DL (ref 13.5–17.5)
HGB BLDA-MCNC: 10.7 G/DL (ref 13.5–17.5)
HGB BLDA-MCNC: 11.3 G/DL (ref 13.5–17.5)
HGB BLDA-MCNC: 11.7 G/DL (ref 13.5–17.5)
HGB BLDA-MCNC: 12.1 G/DL (ref 13.5–17.5)
HGB BLDA-MCNC: 12.5 G/DL (ref 13.5–17.5)
HGB BLDA-MCNC: 9.3 G/DL (ref 13.5–17.5)
HGB BLDA-MCNC: 9.5 G/DL (ref 13.5–17.5)
HGB BLDA-MCNC: 9.6 G/DL (ref 13.5–17.5)
HGB BLDA-MCNC: 9.7 G/DL (ref 13.5–17.5)
INHALED O2 CONCENTRATION: 100 %
INHALED O2 CONCENTRATION: 35 %
INHALED O2 CONCENTRATION: 35 %
INHALED O2 CONCENTRATION: 36 %
INHALED O2 CONCENTRATION: 36 %
INHALED O2 CONCENTRATION: 40 %
INHALED O2 CONCENTRATION: 70 %
INHALED O2 CONCENTRATION: 80 %
INHALED O2 CONCENTRATION: 80 %
INR PPP: 1.4 (ref 0.9–1.1)
INR PPP: 1.6 (ref 0.9–1.1)
LACTATE BLDA-SCNC: 1.4 MMOL/L (ref 0.4–2)
LACTATE BLDA-SCNC: 1.6 MMOL/L (ref 0.4–2)
LACTATE BLDA-SCNC: 1.6 MMOL/L (ref 0.4–2)
LACTATE BLDA-SCNC: 1.7 MMOL/L (ref 0.4–2)
LACTATE BLDA-SCNC: 1.8 MMOL/L (ref 0.4–2)
LACTATE BLDA-SCNC: 1.9 MMOL/L (ref 0.4–2)
LACTATE BLDA-SCNC: 1.9 MMOL/L (ref 0.4–2)
LACTATE BLDA-SCNC: 2.2 MMOL/L (ref 0.4–2)
LACTATE BLDA-SCNC: 2.3 MMOL/L (ref 0.4–2)
LACTATE BLDA-SCNC: 2.5 MMOL/L (ref 0.4–2)
LACTATE BLDA-SCNC: 2.7 MMOL/L (ref 0.4–2)
MAGNESIUM SERPL-MCNC: 2.15 MG/DL (ref 1.6–2.4)
MAGNESIUM SERPL-MCNC: 2.82 MG/DL (ref 1.6–2.4)
MCH RBC QN AUTO: 30.8 PG (ref 26–34)
MCHC RBC AUTO-ENTMCNC: 33 G/DL (ref 32–36)
MCV RBC AUTO: 93 FL (ref 80–100)
METHGB MFR BLDA: 0.6 % (ref 0–1.5)
NRBC BLD-RTO: 0 /100 WBCS (ref 0–0)
OXYHGB MFR BLDA: 96.5 % (ref 94–98)
OXYHGB MFR BLDA: 96.8 % (ref 94–98)
OXYHGB MFR BLDA: 96.9 % (ref 94–98)
OXYHGB MFR BLDA: 97 % (ref 94–98)
OXYHGB MFR BLDA: 97.2 % (ref 94–98)
OXYHGB MFR BLDA: 97.4 % (ref 94–98)
OXYHGB MFR BLDA: 97.5 % (ref 94–98)
OXYHGB MFR BLDA: 97.7 % (ref 94–98)
OXYHGB MFR BLDA: 97.7 % (ref 94–98)
OXYHGB MFR BLDA: 97.9 % (ref 94–98)
OXYHGB MFR BLDMV: 47.7 % (ref 45–75)
OXYHGB MFR BLDV: 60.2 % (ref 45–75)
PCO2 BLDA: 36 MM HG (ref 38–42)
PCO2 BLDA: 39 MM HG (ref 38–42)
PCO2 BLDA: 39 MM HG (ref 38–42)
PCO2 BLDA: 40 MM HG (ref 38–42)
PCO2 BLDA: 41 MM HG (ref 38–42)
PCO2 BLDA: 42 MM HG (ref 38–42)
PCO2 BLDA: 44 MM HG (ref 38–42)
PCO2 BLDA: 48 MM HG (ref 38–42)
PCO2 BLDA: 52 MM HG (ref 38–42)
PCO2 BLDMV: 51 MM HG (ref 41–51)
PCO2 BLDV: 50 MM HG (ref 41–51)
PEEP CMH2O: 5 CM H2O
PH BLDA: 7.33 PH (ref 7.38–7.42)
PH BLDA: 7.36 PH (ref 7.38–7.42)
PH BLDA: 7.39 PH (ref 7.38–7.42)
PH BLDA: 7.4 PH (ref 7.38–7.42)
PH BLDA: 7.43 PH (ref 7.38–7.42)
PH BLDA: 7.44 PH (ref 7.38–7.42)
PH BLDA: 7.44 PH (ref 7.38–7.42)
PH BLDA: 7.45 PH (ref 7.38–7.42)
PH BLDA: 7.47 PH (ref 7.38–7.42)
PH BLDMV: 7.33 PH (ref 7.33–7.43)
PH BLDV: 7.33 PH (ref 7.33–7.43)
PHOSPHATE SERPL-MCNC: 3.4 MG/DL (ref 2.5–4.9)
PLATELET # BLD AUTO: 146 X10*3/UL (ref 150–450)
PLATELET # BLD AUTO: 95 X10*3/UL (ref 150–450)
PO2 BLDA: 102 MM HG (ref 85–95)
PO2 BLDA: 109 MM HG (ref 85–95)
PO2 BLDA: 136 MM HG (ref 85–95)
PO2 BLDA: 292 MM HG (ref 85–95)
PO2 BLDA: 320 MM HG (ref 85–95)
PO2 BLDA: 322 MM HG (ref 85–95)
PO2 BLDA: 332 MM HG (ref 85–95)
PO2 BLDA: 367 MM HG (ref 85–95)
PO2 BLDA: 367 MM HG (ref 85–95)
PO2 BLDA: 390 MM HG (ref 85–95)
PO2 BLDA: 439 MM HG (ref 85–95)
PO2 BLDMV: 31 MM HG (ref 35–45)
PO2 BLDV: 35 MM HG (ref 35–45)
POTASSIUM BLDA-SCNC: 3.6 MMOL/L (ref 3.5–5.3)
POTASSIUM BLDA-SCNC: 3.7 MMOL/L (ref 3.5–5.3)
POTASSIUM BLDA-SCNC: 4.2 MMOL/L (ref 3.5–5.3)
POTASSIUM BLDA-SCNC: 4.5 MMOL/L (ref 3.5–5.3)
POTASSIUM BLDA-SCNC: 4.5 MMOL/L (ref 3.5–5.3)
POTASSIUM BLDA-SCNC: 4.6 MMOL/L (ref 3.5–5.3)
POTASSIUM BLDA-SCNC: 5.1 MMOL/L (ref 3.5–5.3)
POTASSIUM BLDA-SCNC: 5.4 MMOL/L (ref 3.5–5.3)
POTASSIUM BLDA-SCNC: 5.9 MMOL/L (ref 3.5–5.3)
POTASSIUM BLDA-SCNC: 6.4 MMOL/L (ref 3.5–5.3)
POTASSIUM BLDA-SCNC: 6.5 MMOL/L (ref 3.5–5.3)
POTASSIUM SERPL-SCNC: 4.4 MMOL/L (ref 3.5–5.3)
PRODUCT BLOOD TYPE: 5100
PRODUCT BLOOD TYPE: 5100
PRODUCT CODE: NORMAL
PRODUCT CODE: NORMAL
PROTHROMBIN TIME: 15.3 SECONDS (ref 9.8–12.8)
PROTHROMBIN TIME: 17.7 SECONDS (ref 9.8–12.8)
RBC # BLD AUTO: 3.77 X10*6/UL (ref 4.5–5.9)
SAO2 % BLDA: 100 % (ref 94–100)
SAO2 % BLDA: 99 % (ref 94–100)
SAO2 % BLDMV: 49 % (ref 45–75)
SAO2 % BLDV: 62 % (ref 45–75)
SODIUM BLDA-SCNC: 134 MMOL/L (ref 136–145)
SODIUM BLDA-SCNC: 134 MMOL/L (ref 136–145)
SODIUM BLDA-SCNC: 135 MMOL/L (ref 136–145)
SODIUM BLDA-SCNC: 136 MMOL/L (ref 136–145)
SODIUM BLDA-SCNC: 137 MMOL/L (ref 136–145)
SODIUM BLDA-SCNC: 138 MMOL/L (ref 136–145)
SODIUM SERPL-SCNC: 140 MMOL/L (ref 136–145)
SPECIMEN DRAWN FROM PATIENT: ABNORMAL
TIDAL VOLUME: 500 ML
TOTAL MINUTE VOLUME: 10 LITER
UNIT ABO: NORMAL
UNIT ABO: NORMAL
UNIT NUMBER: NORMAL
UNIT NUMBER: NORMAL
UNIT RH: NORMAL
UNIT RH: NORMAL
UNIT VOLUME: 257
UNIT VOLUME: 261
VENTILATOR MODE: ABNORMAL
VENTILATOR RATE: 20 BPM
WBC # BLD AUTO: 13.2 X10*3/UL (ref 4.4–11.3)

## 2024-01-19 PROCEDURE — 2500000005 HC RX 250 GENERAL PHARMACY W/O HCPCS

## 2024-01-19 PROCEDURE — A4649 SURGICAL SUPPLIES: HCPCS | Performed by: THORACIC SURGERY (CARDIOTHORACIC VASCULAR SURGERY)

## 2024-01-19 PROCEDURE — 2500000001 HC RX 250 WO HCPCS SELF ADMINISTERED DRUGS (ALT 637 FOR MEDICARE OP): Performed by: NURSE PRACTITIONER

## 2024-01-19 PROCEDURE — 2780000003 HC OR 278 NO HCPCS: Performed by: THORACIC SURGERY (CARDIOTHORACIC VASCULAR SURGERY)

## 2024-01-19 PROCEDURE — P9045 ALBUMIN (HUMAN), 5%, 250 ML: HCPCS | Mod: JZ | Performed by: NURSE PRACTITIONER

## 2024-01-19 PROCEDURE — 2500000004 HC RX 250 GENERAL PHARMACY W/ HCPCS (ALT 636 FOR OP/ED)

## 2024-01-19 PROCEDURE — 85027 COMPLETE CBC AUTOMATED: CPT | Performed by: NURSE PRACTITIONER

## 2024-01-19 PROCEDURE — 82805 BLOOD GASES W/O2 SATURATION: CPT | Performed by: NURSE PRACTITIONER

## 2024-01-19 PROCEDURE — 5A1221Z PERFORMANCE OF CARDIAC OUTPUT, CONTINUOUS: ICD-10-PCS | Performed by: THORACIC SURGERY (CARDIOTHORACIC VASCULAR SURGERY)

## 2024-01-19 PROCEDURE — 84132 ASSAY OF SERUM POTASSIUM: CPT | Performed by: NURSE PRACTITIONER

## 2024-01-19 PROCEDURE — 3700000001 HC GENERAL ANESTHESIA TIME - INITIAL BASE CHARGE: Performed by: THORACIC SURGERY (CARDIOTHORACIC VASCULAR SURGERY)

## 2024-01-19 PROCEDURE — 85610 PROTHROMBIN TIME: CPT | Performed by: NURSE PRACTITIONER

## 2024-01-19 PROCEDURE — 99024 POSTOP FOLLOW-UP VISIT: CPT | Performed by: INTERNAL MEDICINE

## 2024-01-19 PROCEDURE — 87081 CULTURE SCREEN ONLY: CPT | Mod: PARLAB | Performed by: NURSE PRACTITIONER

## 2024-01-19 PROCEDURE — 02RF08Z REPLACEMENT OF AORTIC VALVE WITH ZOOPLASTIC TISSUE, OPEN APPROACH: ICD-10-PCS | Performed by: THORACIC SURGERY (CARDIOTHORACIC VASCULAR SURGERY)

## 2024-01-19 PROCEDURE — 2500000005 HC RX 250 GENERAL PHARMACY W/O HCPCS: Performed by: THORACIC SURGERY (CARDIOTHORACIC VASCULAR SURGERY)

## 2024-01-19 PROCEDURE — 99291 CRITICAL CARE FIRST HOUR: CPT | Performed by: INTERNAL MEDICINE

## 2024-01-19 PROCEDURE — 88305 TISSUE EXAM BY PATHOLOGIST: CPT | Mod: TC,SUR,PARLAB,WESLAB | Performed by: THORACIC SURGERY (CARDIOTHORACIC VASCULAR SURGERY)

## 2024-01-19 PROCEDURE — 82947 ASSAY GLUCOSE BLOOD QUANT: CPT | Performed by: THORACIC SURGERY (CARDIOTHORACIC VASCULAR SURGERY)

## 2024-01-19 PROCEDURE — 84132 ASSAY OF SERUM POTASSIUM: CPT

## 2024-01-19 PROCEDURE — A33405 PR REPLACE AORT VALV,PROSTH VALV: Performed by: ANESTHESIOLOGY

## 2024-01-19 PROCEDURE — 36556 INSERT NON-TUNNEL CV CATH: CPT | Performed by: ANESTHESIOLOGY

## 2024-01-19 PROCEDURE — 82435 ASSAY OF BLOOD CHLORIDE: CPT | Performed by: NURSE PRACTITIONER

## 2024-01-19 PROCEDURE — 85049 AUTOMATED PLATELET COUNT: CPT | Performed by: THORACIC SURGERY (CARDIOTHORACIC VASCULAR SURGERY)

## 2024-01-19 PROCEDURE — 2500000005 HC RX 250 GENERAL PHARMACY W/O HCPCS: Performed by: NURSE PRACTITIONER

## 2024-01-19 PROCEDURE — 33405 REPLACEMENT AORTIC VALVE OPN: CPT | Performed by: THORACIC SURGERY (CARDIOTHORACIC VASCULAR SURGERY)

## 2024-01-19 PROCEDURE — 83735 ASSAY OF MAGNESIUM: CPT | Performed by: THORACIC SURGERY (CARDIOTHORACIC VASCULAR SURGERY)

## 2024-01-19 PROCEDURE — 33268 EXCL LAA OPN OTH PX ANY METH: CPT | Performed by: THORACIC SURGERY (CARDIOTHORACIC VASCULAR SURGERY)

## 2024-01-19 PROCEDURE — 2500000004 HC RX 250 GENERAL PHARMACY W/ HCPCS (ALT 636 FOR OP/ED): Performed by: NURSE PRACTITIONER

## 2024-01-19 PROCEDURE — 85730 THROMBOPLASTIN TIME PARTIAL: CPT | Performed by: THORACIC SURGERY (CARDIOTHORACIC VASCULAR SURGERY)

## 2024-01-19 PROCEDURE — 2500000004 HC RX 250 GENERAL PHARMACY W/ HCPCS (ALT 636 FOR OP/ED): Performed by: THORACIC SURGERY (CARDIOTHORACIC VASCULAR SURGERY)

## 2024-01-19 PROCEDURE — 82805 BLOOD GASES W/O2 SATURATION: CPT

## 2024-01-19 PROCEDURE — 82375 ASSAY CARBOXYHB QUANT: CPT

## 2024-01-19 PROCEDURE — P9045 ALBUMIN (HUMAN), 5%, 250 ML: HCPCS | Mod: JZ | Performed by: THORACIC SURGERY (CARDIOTHORACIC VASCULAR SURGERY)

## 2024-01-19 PROCEDURE — 83735 ASSAY OF MAGNESIUM: CPT | Performed by: NURSE PRACTITIONER

## 2024-01-19 PROCEDURE — 3600000017 HC OR TIME - EACH INCREMENTAL 1 MINUTE - PROCEDURE LEVEL SIX: Performed by: THORACIC SURGERY (CARDIOTHORACIC VASCULAR SURGERY)

## 2024-01-19 PROCEDURE — 84132 ASSAY OF SERUM POTASSIUM: CPT | Performed by: THORACIC SURGERY (CARDIOTHORACIC VASCULAR SURGERY)

## 2024-01-19 PROCEDURE — 76937 US GUIDE VASCULAR ACCESS: CPT | Performed by: ANESTHESIOLOGY

## 2024-01-19 PROCEDURE — 99100 ANES PT EXTEME AGE<1 YR&>70: CPT | Performed by: ANESTHESIOLOGY

## 2024-01-19 PROCEDURE — 3700000002 HC GENERAL ANESTHESIA TIME - EACH INCREMENTAL 1 MINUTE: Performed by: THORACIC SURGERY (CARDIOTHORACIC VASCULAR SURGERY)

## 2024-01-19 PROCEDURE — 2720000007 HC OR 272 NO HCPCS: Performed by: THORACIC SURGERY (CARDIOTHORACIC VASCULAR SURGERY)

## 2024-01-19 PROCEDURE — 37799 UNLISTED PX VASCULAR SURGERY: CPT | Performed by: NURSE PRACTITIONER

## 2024-01-19 PROCEDURE — 85384 FIBRINOGEN ACTIVITY: CPT | Performed by: NURSE PRACTITIONER

## 2024-01-19 PROCEDURE — 71045 X-RAY EXAM CHEST 1 VIEW: CPT | Performed by: STUDENT IN AN ORGANIZED HEALTH CARE EDUCATION/TRAINING PROGRAM

## 2024-01-19 PROCEDURE — A4217 STERILE WATER/SALINE, 500 ML: HCPCS | Performed by: THORACIC SURGERY (CARDIOTHORACIC VASCULAR SURGERY)

## 2024-01-19 PROCEDURE — 82947 ASSAY GLUCOSE BLOOD QUANT: CPT

## 2024-01-19 PROCEDURE — 85384 FIBRINOGEN ACTIVITY: CPT | Performed by: THORACIC SURGERY (CARDIOTHORACIC VASCULAR SURGERY)

## 2024-01-19 PROCEDURE — 94002 VENT MGMT INPAT INIT DAY: CPT

## 2024-01-19 PROCEDURE — 36430 TRANSFUSION BLD/BLD COMPNT: CPT

## 2024-01-19 PROCEDURE — 99223 1ST HOSP IP/OBS HIGH 75: CPT | Performed by: NURSE PRACTITIONER

## 2024-01-19 PROCEDURE — A33405 PR REPLACE AORT VALV,PROSTH VALV

## 2024-01-19 PROCEDURE — 2500000002 HC RX 250 W HCPCS SELF ADMINISTERED DRUGS (ALT 637 FOR MEDICARE OP, ALT 636 FOR OP/ED)

## 2024-01-19 PROCEDURE — 2500000004 HC RX 250 GENERAL PHARMACY W/ HCPCS (ALT 636 FOR OP/ED): Mod: JZ | Performed by: THORACIC SURGERY (CARDIOTHORACIC VASCULAR SURGERY)

## 2024-01-19 PROCEDURE — 3600000011 HC PERFUSION TIME - INITIAL BASE CHARGE: Performed by: THORACIC SURGERY (CARDIOTHORACIC VASCULAR SURGERY)

## 2024-01-19 PROCEDURE — 85610 PROTHROMBIN TIME: CPT | Performed by: THORACIC SURGERY (CARDIOTHORACIC VASCULAR SURGERY)

## 2024-01-19 PROCEDURE — P9073 PLATELETS PHERESIS PATH REDU: HCPCS

## 2024-01-19 PROCEDURE — 71045 X-RAY EXAM CHEST 1 VIEW: CPT

## 2024-01-19 PROCEDURE — 36430 TRANSFUSION BLD/BLD COMPNT: CPT | Performed by: ANESTHESIOLOGY

## 2024-01-19 PROCEDURE — 37799 UNLISTED PX VASCULAR SURGERY: CPT | Performed by: THORACIC SURGERY (CARDIOTHORACIC VASCULAR SURGERY)

## 2024-01-19 PROCEDURE — 3600000012 HC PERFUSION TIME - EACH INCREMENTAL 1 MINUTE: Performed by: THORACIC SURGERY (CARDIOTHORACIC VASCULAR SURGERY)

## 2024-01-19 PROCEDURE — 3600000018 HC OR TIME - INITIAL BASE CHARGE - PROCEDURE LEVEL SIX: Performed by: THORACIC SURGERY (CARDIOTHORACIC VASCULAR SURGERY)

## 2024-01-19 PROCEDURE — 93005 ELECTROCARDIOGRAM TRACING: CPT

## 2024-01-19 PROCEDURE — 88305 TISSUE EXAM BY PATHOLOGIST: CPT | Performed by: PATHOLOGY

## 2024-01-19 PROCEDURE — C1889 IMPLANT/INSERT DEVICE, NOC: HCPCS | Performed by: THORACIC SURGERY (CARDIOTHORACIC VASCULAR SURGERY)

## 2024-01-19 PROCEDURE — 83050 HGB METHEMOGLOBIN QUAN: CPT

## 2024-01-19 PROCEDURE — 2020000001 HC ICU ROOM DAILY

## 2024-01-19 PROCEDURE — A4312 CATH W/O BAG 2-WAY SILICONE: HCPCS | Performed by: THORACIC SURGERY (CARDIOTHORACIC VASCULAR SURGERY)

## 2024-01-19 PROCEDURE — 82330 ASSAY OF CALCIUM: CPT | Performed by: NURSE PRACTITIONER

## 2024-01-19 PROCEDURE — 36620 INSERTION CATHETER ARTERY: CPT | Performed by: ANESTHESIOLOGY

## 2024-01-19 PROCEDURE — 3E033XZ INTRODUCTION OF VASOPRESSOR INTO PERIPHERAL VEIN, PERCUTANEOUS APPROACH: ICD-10-PCS | Performed by: THORACIC SURGERY (CARDIOTHORACIC VASCULAR SURGERY)

## 2024-01-19 DEVICE — CARDIOPLEGIA SET: Type: IMPLANTABLE DEVICE | Site: CHEST | Status: NON-FUNCTIONAL

## 2024-01-19 DEVICE — VALVE 40025 AVALUS AOR MMX U30 US
Type: IMPLANTABLE DEVICE | Site: AORTA | Status: FUNCTIONAL
Brand: AVALUS™

## 2024-01-19 DEVICE — ATRICLIP FLEX•V DEVICE 35
Type: IMPLANTABLE DEVICE | Site: HEART | Status: FUNCTIONAL
Brand: ATRICLIP FLEX•V

## 2024-01-19 DEVICE — SELECTION GUIDE, AOD1
Type: IMPLANTABLE DEVICE | Site: HEART | Status: FUNCTIONAL
Brand: ATRICLIP® LAA EXCLUSION SYSTEM

## 2024-01-19 DEVICE — IMPLANTABLE DEVICE: Type: IMPLANTABLE DEVICE | Site: HEART | Status: NON-FUNCTIONAL

## 2024-01-19 RX ORDER — PSYLLIUM HUSK 0.4 G
1 CAPSULE ORAL DAILY
Status: DISCONTINUED | OUTPATIENT
Start: 2024-01-19 | End: 2024-01-24 | Stop reason: HOSPADM

## 2024-01-19 RX ORDER — ACETAMINOPHEN 325 MG/1
975 TABLET ORAL EVERY 6 HOURS
Status: DISCONTINUED | OUTPATIENT
Start: 2024-01-19 | End: 2024-01-24 | Stop reason: HOSPADM

## 2024-01-19 RX ORDER — CALCIUM CARBONATE/VITAMIN D3 600MG-5MCG
1 TABLET ORAL DAILY
Status: DISCONTINUED | OUTPATIENT
Start: 2024-01-19 | End: 2024-01-19 | Stop reason: SDUPTHER

## 2024-01-19 RX ORDER — PANTOPRAZOLE SODIUM 40 MG/1
40 TABLET, DELAYED RELEASE ORAL
Status: DISCONTINUED | OUTPATIENT
Start: 2024-01-20 | End: 2024-01-24 | Stop reason: HOSPADM

## 2024-01-19 RX ORDER — DEXTROSE 50 % IN WATER (D50W) INTRAVENOUS SYRINGE
25
Status: DISCONTINUED | OUTPATIENT
Start: 2024-01-19 | End: 2024-01-24 | Stop reason: HOSPADM

## 2024-01-19 RX ORDER — CEFAZOLIN SODIUM 2 G/100ML
2 INJECTION, SOLUTION INTRAVENOUS EVERY 8 HOURS
Status: COMPLETED | OUTPATIENT
Start: 2024-01-19 | End: 2024-01-21

## 2024-01-19 RX ORDER — CHOLECALCIFEROL (VITAMIN D3) 25 MCG
5000 TABLET ORAL DAILY
Status: DISCONTINUED | OUTPATIENT
Start: 2024-01-19 | End: 2024-01-24 | Stop reason: HOSPADM

## 2024-01-19 RX ORDER — VANCOMYCIN HYDROCHLORIDE 1 G/200ML
INJECTION, SOLUTION INTRAVENOUS AS NEEDED
Status: DISCONTINUED | OUTPATIENT
Start: 2024-01-19 | End: 2024-01-19

## 2024-01-19 RX ORDER — CEFAZOLIN SODIUM 2 G/100ML
INJECTION, SOLUTION INTRAVENOUS
Status: COMPLETED
Start: 2024-01-19 | End: 2024-01-19

## 2024-01-19 RX ORDER — DEXMEDETOMIDINE HYDROCHLORIDE 4 UG/ML
0-1.5 INJECTION, SOLUTION INTRAVENOUS CONTINUOUS
Status: DISCONTINUED | OUTPATIENT
Start: 2024-01-19 | End: 2024-01-20

## 2024-01-19 RX ORDER — ONDANSETRON HYDROCHLORIDE 2 MG/ML
INJECTION, SOLUTION INTRAVENOUS
Status: COMPLETED
Start: 2024-01-19 | End: 2024-01-19

## 2024-01-19 RX ORDER — BISACODYL 5 MG
10 TABLET, DELAYED RELEASE (ENTERIC COATED) ORAL DAILY PRN
Status: DISCONTINUED | OUTPATIENT
Start: 2024-01-19 | End: 2024-01-24 | Stop reason: HOSPADM

## 2024-01-19 RX ORDER — LIDOCAINE 560 MG/1
1 PATCH PERCUTANEOUS; TOPICAL; TRANSDERMAL EVERY 24 HOURS
Status: COMPLETED | OUTPATIENT
Start: 2024-01-19 | End: 2024-01-22

## 2024-01-19 RX ORDER — ALBUMIN HUMAN 50 G/1000ML
12.5 SOLUTION INTRAVENOUS AS NEEDED
Status: DISCONTINUED | OUTPATIENT
Start: 2024-01-19 | End: 2024-01-23

## 2024-01-19 RX ORDER — POTASSIUM CHLORIDE 14.9 MG/ML
20 INJECTION INTRAVENOUS
Status: DISCONTINUED | OUTPATIENT
Start: 2024-01-19 | End: 2024-01-24 | Stop reason: HOSPADM

## 2024-01-19 RX ORDER — SODIUM CHLORIDE, SODIUM GLUCONATE, SODIUM ACETATE, POTASSIUM CHLORIDE AND MAGNESIUM CHLORIDE 30; 37; 368; 526; 502 MG/100ML; MG/100ML; MG/100ML; MG/100ML; MG/100ML
1000 INJECTION, SOLUTION INTRAVENOUS ONCE
Status: COMPLETED | OUTPATIENT
Start: 2024-01-19 | End: 2024-01-19

## 2024-01-19 RX ORDER — ALBUMIN HUMAN 50 G/1000ML
12.5 SOLUTION INTRAVENOUS
Status: DISCONTINUED | OUTPATIENT
Start: 2024-01-19 | End: 2024-01-19

## 2024-01-19 RX ORDER — OXYCODONE HYDROCHLORIDE 5 MG/1
10 TABLET ORAL EVERY 6 HOURS PRN
Status: DISCONTINUED | OUTPATIENT
Start: 2024-01-19 | End: 2024-01-23

## 2024-01-19 RX ORDER — MAGNESIUM SULFATE HEPTAHYDRATE 40 MG/ML
4 INJECTION, SOLUTION INTRAVENOUS EVERY 6 HOURS PRN
Status: DISCONTINUED | OUTPATIENT
Start: 2024-01-19 | End: 2024-01-24 | Stop reason: HOSPADM

## 2024-01-19 RX ORDER — MIDAZOLAM HYDROCHLORIDE 1 MG/ML
INJECTION, SOLUTION INTRAMUSCULAR; INTRAVENOUS AS NEEDED
Status: DISCONTINUED | OUTPATIENT
Start: 2024-01-19 | End: 2024-01-19

## 2024-01-19 RX ORDER — AMOXICILLIN 250 MG
2 CAPSULE ORAL 2 TIMES DAILY
Status: DISCONTINUED | OUTPATIENT
Start: 2024-01-19 | End: 2024-01-24 | Stop reason: HOSPADM

## 2024-01-19 RX ORDER — CALCIUM CHLORIDE INJECTION 100 MG/ML
INJECTION, SOLUTION INTRAVENOUS AS NEEDED
Status: DISCONTINUED | OUTPATIENT
Start: 2024-01-19 | End: 2024-01-19

## 2024-01-19 RX ORDER — VANCOMYCIN HYDROCHLORIDE 1 G/20ML
INJECTION, POWDER, LYOPHILIZED, FOR SOLUTION INTRAVENOUS AS NEEDED
Status: DISCONTINUED | OUTPATIENT
Start: 2024-01-19 | End: 2024-01-19

## 2024-01-19 RX ORDER — SODIUM CHLORIDE, SODIUM LACTATE, POTASSIUM CHLORIDE, CALCIUM CHLORIDE 600; 310; 30; 20 MG/100ML; MG/100ML; MG/100ML; MG/100ML
INJECTION, SOLUTION INTRAVENOUS CONTINUOUS PRN
Status: DISCONTINUED | OUTPATIENT
Start: 2024-01-19 | End: 2024-01-19

## 2024-01-19 RX ORDER — MAGNESIUM SULFATE HEPTAHYDRATE 40 MG/ML
2 INJECTION, SOLUTION INTRAVENOUS EVERY 6 HOURS PRN
Status: DISCONTINUED | OUTPATIENT
Start: 2024-01-19 | End: 2024-01-24 | Stop reason: HOSPADM

## 2024-01-19 RX ORDER — NALOXONE HYDROCHLORIDE 1 MG/ML
0.2 INJECTION INTRAMUSCULAR; INTRAVENOUS; SUBCUTANEOUS EVERY 5 MIN PRN
Status: DISCONTINUED | OUTPATIENT
Start: 2024-01-19 | End: 2024-01-24 | Stop reason: HOSPADM

## 2024-01-19 RX ORDER — GLYCOPYRROLATE 0.2 MG/ML
INJECTION INTRAMUSCULAR; INTRAVENOUS AS NEEDED
Status: DISCONTINUED | OUTPATIENT
Start: 2024-01-19 | End: 2024-01-19

## 2024-01-19 RX ORDER — LANOLIN ALCOHOL/MO/W.PET/CERES
400 CREAM (GRAM) TOPICAL DAILY
Status: DISCONTINUED | OUTPATIENT
Start: 2024-01-19 | End: 2024-01-24 | Stop reason: HOSPADM

## 2024-01-19 RX ORDER — GABAPENTIN 100 MG/1
100 CAPSULE ORAL 3 TIMES DAILY
Status: DISCONTINUED | OUTPATIENT
Start: 2024-01-19 | End: 2024-01-24 | Stop reason: HOSPADM

## 2024-01-19 RX ORDER — NITROGLYCERIN 40 MG/100ML
INJECTION INTRAVENOUS AS NEEDED
Status: DISCONTINUED | OUTPATIENT
Start: 2024-01-19 | End: 2024-01-19

## 2024-01-19 RX ORDER — OXYCODONE HYDROCHLORIDE 5 MG/1
5 TABLET ORAL EVERY 6 HOURS PRN
Status: DISCONTINUED | OUTPATIENT
Start: 2024-01-19 | End: 2024-01-23

## 2024-01-19 RX ORDER — ALBUMIN HUMAN 50 G/1000ML
12.5 SOLUTION INTRAVENOUS AS NEEDED
Status: COMPLETED | OUTPATIENT
Start: 2024-01-19 | End: 2024-01-20

## 2024-01-19 RX ORDER — DEXMEDETOMIDINE HYDROCHLORIDE 4 UG/ML
INJECTION, SOLUTION INTRAVENOUS CONTINUOUS PRN
Status: DISCONTINUED | OUTPATIENT
Start: 2024-01-19 | End: 2024-01-19

## 2024-01-19 RX ORDER — MAGNESIUM SULFATE HEPTAHYDRATE 500 MG/ML
INJECTION, SOLUTION INTRAMUSCULAR; INTRAVENOUS AS NEEDED
Status: DISCONTINUED | OUTPATIENT
Start: 2024-01-19 | End: 2024-01-19

## 2024-01-19 RX ORDER — ESCITALOPRAM OXALATE 10 MG/1
10 TABLET ORAL 2 TIMES DAILY
Status: DISCONTINUED | OUTPATIENT
Start: 2024-01-20 | End: 2024-01-24 | Stop reason: HOSPADM

## 2024-01-19 RX ORDER — POTASSIUM CHLORIDE 29.8 MG/ML
40 INJECTION INTRAVENOUS EVERY 2 HOUR PRN
Status: DISCONTINUED | OUTPATIENT
Start: 2024-01-19 | End: 2024-01-24 | Stop reason: HOSPADM

## 2024-01-19 RX ORDER — LIDOCAINE HCL/PF 100 MG/5ML
SYRINGE (ML) INTRAVENOUS AS NEEDED
Status: DISCONTINUED | OUTPATIENT
Start: 2024-01-19 | End: 2024-01-19

## 2024-01-19 RX ORDER — ONDANSETRON HYDROCHLORIDE 2 MG/ML
4 INJECTION, SOLUTION INTRAVENOUS EVERY 4 HOURS PRN
Status: DISCONTINUED | OUTPATIENT
Start: 2024-01-19 | End: 2024-01-24 | Stop reason: HOSPADM

## 2024-01-19 RX ORDER — VANCOMYCIN HYDROCHLORIDE 1 G/20ML
INJECTION, POWDER, LYOPHILIZED, FOR SOLUTION INTRAVENOUS AS NEEDED
Status: DISCONTINUED | OUTPATIENT
Start: 2024-01-19 | End: 2024-01-19 | Stop reason: HOSPADM

## 2024-01-19 RX ORDER — PHENYLEPHRINE HCL IN 0.9% NACL 1 MG/10 ML
SYRINGE (ML) INTRAVENOUS AS NEEDED
Status: DISCONTINUED | OUTPATIENT
Start: 2024-01-19 | End: 2024-01-19

## 2024-01-19 RX ORDER — CEFAZOLIN SODIUM 2 G/100ML
INJECTION, SOLUTION INTRAVENOUS AS NEEDED
Status: DISCONTINUED | OUTPATIENT
Start: 2024-01-19 | End: 2024-01-19

## 2024-01-19 RX ORDER — NITROGLYCERIN 20 MG/100ML
INJECTION INTRAVENOUS CONTINUOUS PRN
Status: DISCONTINUED | OUTPATIENT
Start: 2024-01-19 | End: 2024-01-19

## 2024-01-19 RX ORDER — ENOXAPARIN SODIUM 100 MG/ML
40 INJECTION SUBCUTANEOUS DAILY
Status: DISCONTINUED | OUTPATIENT
Start: 2024-01-20 | End: 2024-01-22

## 2024-01-19 RX ORDER — FENTANYL CITRATE 50 UG/ML
INJECTION, SOLUTION INTRAMUSCULAR; INTRAVENOUS AS NEEDED
Status: DISCONTINUED | OUTPATIENT
Start: 2024-01-19 | End: 2024-01-19

## 2024-01-19 RX ORDER — VANCOMYCIN HYDROCHLORIDE 1 G/200ML
INJECTION, SOLUTION INTRAVENOUS
Status: COMPLETED
Start: 2024-01-19 | End: 2024-01-19

## 2024-01-19 RX ORDER — ESCITALOPRAM OXALATE 10 MG/1
10 TABLET ORAL 2 TIMES DAILY
Status: DISCONTINUED | OUTPATIENT
Start: 2024-01-19 | End: 2024-01-19

## 2024-01-19 RX ORDER — ATORVASTATIN CALCIUM 80 MG/1
80 TABLET, FILM COATED ORAL DAILY
Status: DISCONTINUED | OUTPATIENT
Start: 2024-01-19 | End: 2024-01-24 | Stop reason: HOSPADM

## 2024-01-19 RX ORDER — NOREPINEPHRINE BITARTRATE 0.03 MG/ML
0-3.3 INJECTION, SOLUTION INTRAVENOUS CONTINUOUS
Status: DISCONTINUED | OUTPATIENT
Start: 2024-01-19 | End: 2024-01-20

## 2024-01-19 RX ORDER — DEXTROSE 50 % IN WATER (D50W) INTRAVENOUS SYRINGE
AS NEEDED
Status: DISCONTINUED | OUTPATIENT
Start: 2024-01-19 | End: 2024-01-19

## 2024-01-19 RX ORDER — FENTANYL CITRATE 50 UG/ML
25 INJECTION, SOLUTION INTRAMUSCULAR; INTRAVENOUS
Status: DISCONTINUED | OUTPATIENT
Start: 2024-01-19 | End: 2024-01-22

## 2024-01-19 RX ORDER — HEPARIN SODIUM 10000 [USP'U]/ML
INJECTION, SOLUTION INTRAVENOUS; SUBCUTANEOUS AS NEEDED
Status: DISCONTINUED | OUTPATIENT
Start: 2024-01-19 | End: 2024-01-19

## 2024-01-19 RX ORDER — PROTAMINE SULFATE 10 MG/ML
INJECTION, SOLUTION INTRAVENOUS AS NEEDED
Status: DISCONTINUED | OUTPATIENT
Start: 2024-01-19 | End: 2024-01-19

## 2024-01-19 RX ORDER — INSULIN LISPRO 100 [IU]/ML
0-15 INJECTION, SOLUTION INTRAVENOUS; SUBCUTANEOUS EVERY 4 HOURS
Status: DISCONTINUED | OUTPATIENT
Start: 2024-01-19 | End: 2024-01-20

## 2024-01-19 RX ORDER — SODIUM CHLORIDE 0.9 G/100ML
INJECTION, SOLUTION IRRIGATION AS NEEDED
Status: DISCONTINUED | OUTPATIENT
Start: 2024-01-19 | End: 2024-01-19 | Stop reason: HOSPADM

## 2024-01-19 RX ORDER — ROCURONIUM BROMIDE 10 MG/ML
INJECTION, SOLUTION INTRAVENOUS AS NEEDED
Status: DISCONTINUED | OUTPATIENT
Start: 2024-01-19 | End: 2024-01-19

## 2024-01-19 RX ORDER — ETOMIDATE 2 MG/ML
INJECTION INTRAVENOUS AS NEEDED
Status: DISCONTINUED | OUTPATIENT
Start: 2024-01-19 | End: 2024-01-19

## 2024-01-19 RX ADMIN — Medication 100 MCG: at 12:17

## 2024-01-19 RX ADMIN — ONDANSETRON 4 MG: 2 INJECTION INTRAMUSCULAR; INTRAVENOUS at 18:41

## 2024-01-19 RX ADMIN — MAGNESIUM OXIDE TAB 400 MG (241.3 MG ELEMENTAL MG) 400 MG: 400 (241.3 MG) TAB at 17:32

## 2024-01-19 RX ADMIN — Medication 100 MCG: at 13:10

## 2024-01-19 RX ADMIN — ROCURONIUM BROMIDE 20 MG: 10 INJECTION, SOLUTION INTRAVENOUS at 09:05

## 2024-01-19 RX ADMIN — Medication 300 MCG: at 08:55

## 2024-01-19 RX ADMIN — LIDOCAINE HYDROCHLORIDE 100 MG: 20 INJECTION INTRAVENOUS at 07:57

## 2024-01-19 RX ADMIN — PROTAMINE SULFATE 500 MG: 10 INJECTION, SOLUTION INTRAVENOUS at 11:37

## 2024-01-19 RX ADMIN — NITROGLYCERIN 10 MCG/MIN: 20 INJECTION INTRAVENOUS at 09:00

## 2024-01-19 RX ADMIN — SODIUM CHLORIDE, SODIUM LACTATE, POTASSIUM CHLORIDE, CALCIUM CHLORIDE: 600; 310; 30; 20 INJECTION, SOLUTION INTRAVENOUS at 08:20

## 2024-01-19 RX ADMIN — INSULIN HUMAN 10 UNITS: 100 INJECTION, SOLUTION PARENTERAL at 11:19

## 2024-01-19 RX ADMIN — FENTANYL CITRATE 25 MCG: 50 INJECTION INTRAMUSCULAR; INTRAVENOUS at 19:40

## 2024-01-19 RX ADMIN — SODIUM CHLORIDE, SODIUM LACTATE, POTASSIUM CHLORIDE, AND CALCIUM CHLORIDE: 600; 310; 30; 20 INJECTION, SOLUTION INTRAVENOUS at 07:42

## 2024-01-19 RX ADMIN — ETOMIDATE 20 MG: 20 INJECTION, SOLUTION INTRAVENOUS at 07:57

## 2024-01-19 RX ADMIN — CEFAZOLIN SODIUM 2 G: 2 INJECTION, SOLUTION INTRAVENOUS at 12:35

## 2024-01-19 RX ADMIN — Medication 100 MCG: at 09:20

## 2024-01-19 RX ADMIN — MIDAZOLAM 2 MG: 1 INJECTION INTRAMUSCULAR; INTRAVENOUS at 07:46

## 2024-01-19 RX ADMIN — DEXMEDETOMIDINE HYDROCHLORIDE 0.98 MCG/KG/HR: 400 INJECTION INTRAVENOUS at 23:28

## 2024-01-19 RX ADMIN — CALCIUM CHLORIDE 0.5 G: 100 INJECTION, SOLUTION INTRAVENOUS; INTRAVENTRICULAR at 11:38

## 2024-01-19 RX ADMIN — Medication 200 MCG: at 08:51

## 2024-01-19 RX ADMIN — SODIUM CHLORIDE, SODIUM GLUCONATE, SODIUM ACETATE, POTASSIUM CHLORIDE AND MAGNESIUM CHLORIDE 1000 ML: 526; 502; 368; 37; 30 INJECTION, SOLUTION INTRAVENOUS at 20:50

## 2024-01-19 RX ADMIN — SODIUM CHLORIDE: 9 INJECTION, SOLUTION INTRAVENOUS at 08:20

## 2024-01-19 RX ADMIN — ROCURONIUM BROMIDE 80 MG: 10 INJECTION, SOLUTION INTRAVENOUS at 07:58

## 2024-01-19 RX ADMIN — FENTANYL CITRATE 50 MCG: 50 INJECTION, SOLUTION INTRAMUSCULAR; INTRAVENOUS at 13:04

## 2024-01-19 RX ADMIN — ALBUMIN HUMAN 12.5 G: 0.05 INJECTION, SOLUTION INTRAVENOUS at 16:23

## 2024-01-19 RX ADMIN — Medication 200 MCG: at 09:00

## 2024-01-19 RX ADMIN — LIDOCAINE 1 PATCH: 4 PATCH TOPICAL at 17:32

## 2024-01-19 RX ADMIN — Medication 40 PERCENT: at 13:47

## 2024-01-19 RX ADMIN — Medication 150 MCG: at 12:10

## 2024-01-19 RX ADMIN — Medication 8 MCG: at 09:27

## 2024-01-19 RX ADMIN — CEFAZOLIN SODIUM 2 G: 2 INJECTION, SOLUTION INTRAVENOUS at 21:27

## 2024-01-19 RX ADMIN — DEXTROSE MONOHYDRATE 12.5 G: 25 INJECTION, SOLUTION INTRAVENOUS at 10:57

## 2024-01-19 RX ADMIN — HEPARIN SODIUM 44000 UNITS: 10000 INJECTION INTRAVENOUS; SUBCUTANEOUS at 09:13

## 2024-01-19 RX ADMIN — Medication 100 MCG: at 11:51

## 2024-01-19 RX ADMIN — Medication 100 MCG: at 08:22

## 2024-01-19 RX ADMIN — Medication 50 MCG: at 08:12

## 2024-01-19 RX ADMIN — Medication 100 MCG: at 11:35

## 2024-01-19 RX ADMIN — Medication 50 MCG: at 09:24

## 2024-01-19 RX ADMIN — Medication 100 MCG: at 08:39

## 2024-01-19 RX ADMIN — ROCURONIUM BROMIDE 20 MG: 10 INJECTION, SOLUTION INTRAVENOUS at 12:11

## 2024-01-19 RX ADMIN — FENTANYL CITRATE 100 MCG: 50 INJECTION, SOLUTION INTRAMUSCULAR; INTRAVENOUS at 08:12

## 2024-01-19 RX ADMIN — FENTANYL CITRATE 100 MCG: 50 INJECTION, SOLUTION INTRAMUSCULAR; INTRAVENOUS at 08:35

## 2024-01-19 RX ADMIN — CHOLECALCIFEROL TAB 25 MCG (1000 UNIT) 5000 UNITS: 25 TAB at 17:23

## 2024-01-19 RX ADMIN — ROCURONIUM BROMIDE 30 MG: 10 INJECTION, SOLUTION INTRAVENOUS at 10:35

## 2024-01-19 RX ADMIN — INSULIN HUMAN 5 UNITS: 100 INJECTION, SOLUTION PARENTERAL at 10:57

## 2024-01-19 RX ADMIN — FENTANYL CITRATE 50 MCG: 50 INJECTION, SOLUTION INTRAMUSCULAR; INTRAVENOUS at 11:11

## 2024-01-19 RX ADMIN — VANCOMYCIN HYDROCHLORIDE 1 G: 1 INJECTION, SOLUTION INTRAVENOUS at 07:50

## 2024-01-19 RX ADMIN — ATORVASTATIN CALCIUM 80 MG: 80 TABLET, FILM COATED ORAL at 17:23

## 2024-01-19 RX ADMIN — PSYLLIUM HUSK 1 PACKET: 3.4 POWDER ORAL at 13:45

## 2024-01-19 RX ADMIN — SENNOSIDES AND DOCUSATE SODIUM 2 TABLET: 8.6; 5 TABLET ORAL at 17:32

## 2024-01-19 RX ADMIN — ACETAMINOPHEN 975 MG: 325 TABLET ORAL at 17:23

## 2024-01-19 RX ADMIN — FENTANYL CITRATE 100 MCG: 50 INJECTION, SOLUTION INTRAMUSCULAR; INTRAVENOUS at 08:45

## 2024-01-19 RX ADMIN — MAGNESIUM SULFATE HEPTAHYDRATE 0.5 G: 500 INJECTION, SOLUTION INTRAMUSCULAR; INTRAVENOUS at 11:33

## 2024-01-19 RX ADMIN — ALBUMIN HUMAN 12.5 G: 0.05 INJECTION, SOLUTION INTRAVENOUS at 11:43

## 2024-01-19 RX ADMIN — Medication 100 MCG: at 09:15

## 2024-01-19 RX ADMIN — GLYCOPYRROLATE 0.3 MG: 0.2 INJECTION, SOLUTION INTRAMUSCULAR; INTRAVENOUS at 06:42

## 2024-01-19 RX ADMIN — Medication 0.02 MCG/KG/MIN: at 11:51

## 2024-01-19 RX ADMIN — FENTANYL CITRATE 100 MCG: 50 INJECTION, SOLUTION INTRAMUSCULAR; INTRAVENOUS at 07:57

## 2024-01-19 RX ADMIN — Medication 100 MCG: at 08:06

## 2024-01-19 RX ADMIN — DEXMEDETOMIDINE HYDROCHLORIDE 0.5 MCG/KG/HR: 400 INJECTION INTRAVENOUS at 19:15

## 2024-01-19 RX ADMIN — SODIUM CHLORIDE, SODIUM LACTATE, POTASSIUM CHLORIDE, AND CALCIUM CHLORIDE: 600; 310; 30; 20 INJECTION, SOLUTION INTRAVENOUS at 09:45

## 2024-01-19 RX ADMIN — SODIUM CHLORIDE, SODIUM GLUCONATE, SODIUM ACETATE, POTASSIUM CHLORIDE AND MAGNESIUM CHLORIDE 1000 ML: 526; 502; 368; 37; 30 INJECTION, SOLUTION INTRAVENOUS at 16:23

## 2024-01-19 RX ADMIN — OXYCODONE HYDROCHLORIDE 10 MG: 5 TABLET ORAL at 17:24

## 2024-01-19 RX ADMIN — GABAPENTIN 100 MG: 100 CAPSULE ORAL at 17:24

## 2024-01-19 RX ADMIN — FENTANYL CITRATE 100 MCG: 50 INJECTION, SOLUTION INTRAMUSCULAR; INTRAVENOUS at 08:58

## 2024-01-19 RX ADMIN — FENTANYL CITRATE 50 MCG: 50 INJECTION, SOLUTION INTRAMUSCULAR; INTRAVENOUS at 12:55

## 2024-01-19 RX ADMIN — DEXMEDETOMIDINE HYDROCHLORIDE 0.4 MCG/KG/HR: 400 INJECTION INTRAVENOUS at 09:34

## 2024-01-19 RX ADMIN — CEFAZOLIN SODIUM 2 G: 2 INJECTION, SOLUTION INTRAVENOUS at 08:35

## 2024-01-19 RX ADMIN — Medication 150 MCG: at 08:48

## 2024-01-19 RX ADMIN — Medication 100 MCG: at 08:37

## 2024-01-19 RX ADMIN — Medication 100 MCG: at 12:29

## 2024-01-19 SDOH — SOCIAL STABILITY: SOCIAL INSECURITY: ARE THERE ANY APPARENT SIGNS OF INJURIES/BEHAVIORS THAT COULD BE RELATED TO ABUSE/NEGLECT?: NO

## 2024-01-19 SDOH — SOCIAL STABILITY: SOCIAL INSECURITY: HAS ANYONE EVER THREATENED TO HURT YOUR FAMILY OR YOUR PETS?: NO

## 2024-01-19 SDOH — SOCIAL STABILITY: SOCIAL INSECURITY: DO YOU FEEL UNSAFE GOING BACK TO THE PLACE WHERE YOU ARE LIVING?: NO

## 2024-01-19 SDOH — SOCIAL STABILITY: SOCIAL INSECURITY: ABUSE: ADULT

## 2024-01-19 SDOH — SOCIAL STABILITY: SOCIAL INSECURITY: DOES ANYONE TRY TO KEEP YOU FROM HAVING/CONTACTING OTHER FRIENDS OR DOING THINGS OUTSIDE YOUR HOME?: NO

## 2024-01-19 SDOH — SOCIAL STABILITY: SOCIAL INSECURITY: WERE YOU ABLE TO COMPLETE ALL THE BEHAVIORAL HEALTH SCREENINGS?: YES

## 2024-01-19 SDOH — SOCIAL STABILITY: SOCIAL INSECURITY: ARE YOU OR HAVE YOU BEEN THREATENED OR ABUSED PHYSICALLY, EMOTIONALLY, OR SEXUALLY BY ANYONE?: NO

## 2024-01-19 SDOH — SOCIAL STABILITY: SOCIAL INSECURITY: DO YOU FEEL ANYONE HAS EXPLOITED OR TAKEN ADVANTAGE OF YOU FINANCIALLY OR OF YOUR PERSONAL PROPERTY?: NO

## 2024-01-19 SDOH — SOCIAL STABILITY: SOCIAL INSECURITY: HAVE YOU HAD THOUGHTS OF HARMING ANYONE ELSE?: NO

## 2024-01-19 SDOH — HEALTH STABILITY: MENTAL HEALTH: CURRENT SMOKER: 1

## 2024-01-19 ASSESSMENT — COGNITIVE AND FUNCTIONAL STATUS - GENERAL
MOVING FROM LYING ON BACK TO SITTING ON SIDE OF FLAT BED WITH BEDRAILS: TOTAL
WALKING IN HOSPITAL ROOM: TOTAL
TOILETING: A LOT
CLIMB 3 TO 5 STEPS WITH RAILING: TOTAL
PATIENT BASELINE BEDBOUND: NO
STANDING UP FROM CHAIR USING ARMS: TOTAL
DAILY ACTIVITIY SCORE: 6
DRESSING REGULAR LOWER BODY CLOTHING: TOTAL
MOVING TO AND FROM BED TO CHAIR: A LOT
MOBILITY SCORE: 6
TOILETING: TOTAL
EATING MEALS: TOTAL
MOVING FROM LYING ON BACK TO SITTING ON SIDE OF FLAT BED WITH BEDRAILS: A LITTLE
DAILY ACTIVITIY SCORE: 13
CLIMB 3 TO 5 STEPS WITH RAILING: TOTAL
HELP NEEDED FOR BATHING: A LOT
DRESSING REGULAR LOWER BODY CLOTHING: A LOT
PERSONAL GROOMING: A LITTLE
MOVING TO AND FROM BED TO CHAIR: TOTAL
DRESSING REGULAR UPPER BODY CLOTHING: A LOT
EATING MEALS: A LOT
PERSONAL GROOMING: TOTAL
WALKING IN HOSPITAL ROOM: TOTAL
HELP NEEDED FOR BATHING: TOTAL
STANDING UP FROM CHAIR USING ARMS: A LOT
MOBILITY SCORE: 11
TURNING FROM BACK TO SIDE WHILE IN FLAT BAD: TOTAL
TURNING FROM BACK TO SIDE WHILE IN FLAT BAD: A LOT
DRESSING REGULAR UPPER BODY CLOTHING: TOTAL

## 2024-01-19 ASSESSMENT — PATIENT HEALTH QUESTIONNAIRE - PHQ9
2. FEELING DOWN, DEPRESSED OR HOPELESS: NOT AT ALL
SUM OF ALL RESPONSES TO PHQ9 QUESTIONS 1 & 2: 0
1. LITTLE INTEREST OR PLEASURE IN DOING THINGS: NOT AT ALL

## 2024-01-19 ASSESSMENT — PAIN SCALES - GENERAL
PAINLEVEL_OUTOF10: 0 - NO PAIN
PAINLEVEL_OUTOF10: 10 - WORST POSSIBLE PAIN

## 2024-01-19 ASSESSMENT — ACTIVITIES OF DAILY LIVING (ADL)
PATIENT'S MEMORY ADEQUATE TO SAFELY COMPLETE DAILY ACTIVITIES?: YES
TOILETING: NEEDS ASSISTANCE
LACK_OF_TRANSPORTATION: NO
GROOMING: NEEDS ASSISTANCE
WALKS IN HOME: DEPENDENT
HEARING - RIGHT EAR: FUNCTIONAL
BATHING: NEEDS ASSISTANCE
HEARING - LEFT EAR: FUNCTIONAL
JUDGMENT_ADEQUATE_SAFELY_COMPLETE_DAILY_ACTIVITIES: YES
DRESSING YOURSELF: NEEDS ASSISTANCE
ADEQUATE_TO_COMPLETE_ADL: YES
FEEDING YOURSELF: NEEDS ASSISTANCE

## 2024-01-19 ASSESSMENT — PAIN - FUNCTIONAL ASSESSMENT
PAIN_FUNCTIONAL_ASSESSMENT: CPOT (CRITICAL CARE PAIN OBSERVATION TOOL)
PAIN_FUNCTIONAL_ASSESSMENT: 0-10
PAIN_FUNCTIONAL_ASSESSMENT: CPOT (CRITICAL CARE PAIN OBSERVATION TOOL)
PAIN_FUNCTIONAL_ASSESSMENT: CPOT (CRITICAL CARE PAIN OBSERVATION TOOL)

## 2024-01-19 ASSESSMENT — LIFESTYLE VARIABLES
AUDIT-C TOTAL SCORE: 0
HOW OFTEN DO YOU HAVE A DRINK CONTAINING ALCOHOL: NEVER
HOW OFTEN DO YOU HAVE 6 OR MORE DRINKS ON ONE OCCASION: NEVER
SKIP TO QUESTIONS 9-10: 1
HOW MANY STANDARD DRINKS CONTAINING ALCOHOL DO YOU HAVE ON A TYPICAL DAY: PATIENT DOES NOT DRINK
AUDIT-C TOTAL SCORE: 0

## 2024-01-19 ASSESSMENT — PAIN DESCRIPTION - DESCRIPTORS: DESCRIPTORS: SHARP

## 2024-01-19 NOTE — BRIEF OP NOTE
Date: 2024  OR Location: [facility] [place]    Name: Jerel Drummond, : 1950, Age: 73 y.o., MRN: [id number], Sex: male    Diagnosis  Pre-op Diagnosis     * Nonrheumatic aortic valve stenosis [I35.0] Post-op Diagnosis     * Nonrheumatic aortic valve stenosis [I35.0]     Procedures  AORTIC VALVE REPLACEMENT WITH TISSUE GRAFT / PATIENT IS PART OF LEEAP TRIAL AND MAY OR MAY NOT HAVE RECEIVED A CLIP/ LEFT POSTERIOR CARDIAL WINDOW  [id number] - IN RPLCMT PROST AORTIC VALVE OPEN XCP HOMOGRF/STENT  Median sternotomy   2. Standard cannulation for cardiopulmonary bypass   3. Aortic valve replacement w a 25mm Avalus aortic tissue graft  4. LLEAP trial, pt may or may not have  received a left atrial appendage clip  5. Posterior pericardial window   6. Rod-wires and regular wires for sternal closure    Chest Tubes/Drains: 1 left pleural chest tube/ 1 mediastinal chest tube       Temporary Pacing Wires: atrial and ventricular wires    -Settings:VVI 80   -Underlying Rhythm:NSR    Permanent pacer/ICD: No   -Preoperative settings:    -Intra-op/ Postoperative settings:    Sternotomy performed by: Ceasar Connolly    Conduit Harvested by: MATTHEW    Sternal Wires placed by: Ceasar Connolly    Arm/Leg/Groin Closure/Cutdown performed by: MATTHEW    Cardio Pulmonary Bypass Time: 117 minutes  Cross-clamp Time: 80 minutes  Circulatory Arrest: No Time:     Is patient candidate for Emergency Re-sternotomy? No   -If yes, POD #10 is -      Surgeons      * Jakub Eddy - Primary    Resident/Fellow/Other Assistant:  Surgeon(s) and Role:Ceasar Connolly/ Olive Redman/ Arnav Lopez    Procedure Summary  Anesthesia: General  ASA: III  Anesthesia Staff: Anesthesiologist: Víctor Mueller MD  CRNA: RILEY Chavez-CRNA  Perfusionist: José Miguel Early  Estimated Blood Loss: 250mL  Intra-op Medications:   Medication Name Total Dose   sodium chloride 0.9 % irrigation solution 1,000 mL   vancomycin (Vancocin) vial for injection 4 g   norepinephrine (Levophed) 8 mg in  sodium chloride 0.9% 250 mL (0.032 mg/mL) infusion (premix) 0.24 mg   albumin human 5 % infusion 12.5 g 12.5 g   ceFAZolin in dextrose (iso-os) (Ancef) IVPB  - Omnicell Override Pull Cannot be calculated   vancomycin (Vancocin) in dextrose 5% water  - Omnicell Override Pull Cannot be calculated              Anesthesia Record               Intraprocedure I/O Totals          Intake    PLATELETS 518.00 mL    Dexmedetomidine 0.00 mL    The total shown is the total volume documented since Anesthesia Start was filed.    Norepinephrine Drip 0.00 mL    The total shown is the total volume documented since Anesthesia Start was filed.    Nitroglycerin Drip 0.00 mL    The total shown is the total volume documented since Anesthesia Start was filed.    LR infusion 1000.00 mL    Cell Saver 390 mL    Total Intake 1908 mL       Output    Urine 900 mL    Est. Blood Loss 500 mL    Total Output 1400 mL       Net    Net Volume 508 mL          Specimen:   ID Type Source Tests Collected by Time   1 : AORTIC LEAFLETS Tissue AORTIC VALVE SURGICAL PATHOLOGY EXAM Jakub Eddy MD 1/19/2024 1019        Staff:   Circulator: Corina Lara RN; Alina Howe RN  Relief Circulator: Janis Mccarthy RN  Relief Scrub: Caren Pelayo  Scrub Person: Kayleigh Vargas; Marsha Grajeda          Findings: Severely calcified aortic valve    Complications:  None; patient tolerated the procedure well.     Disposition: ICU - intubated and hemodynamically stable.  Condition: stable  Specimens Collected:   ID Type Source Tests Collected by Time   1 : AORTIC LEAFLETS Tissue AORTIC VALVE SURGICAL PATHOLOGY EXAM Jakub Eddy MD 1/19/2024 1019     Attending Attestation: I was present and scrubbed for the key portions of the procedure.    Jakub Eddy  Phone Number: [phone]

## 2024-01-19 NOTE — ANESTHESIA PROCEDURE NOTES
Central Venous Line:    Date/Time: 1/19/2024 8:20 AM    A central venous line was placed in the OR for the following indication(s): CVP monitoring.  Staffing  Performed: attending   Authorized by: Víctor Mueller MD    Performed by: Víctor Mueller MD    Sterility preparation included the following: provider hand hygiene performed prior to central venous catheter insertion, all 5 sterile barriers used (gloves, gown, cap, mask, large sterile drape) during central venous catheter insertion, antiseptic used during central venous catheter insertion and skin prep agent completely dried prior to procedure.  The patient was placed in Trendelenburg position.    Right internal jugular vein was prepped.    The site was prepped with Chlorhexidine.  Size: 9 Fr   Catheter type: introducer   Number of Lumens: single lumen    This catheter was an oximetric catheter.    During the procedure, the following specific steps were taken: target vein identified, needle advanced into vein and blood aspirated and guidewire advanced into vein.  Seldinger technique used.  Procedure performed using ultrasound guidance.  Sterile gel and probe cover used in ultrasound-guided central venous catheter insertion.    Intravenous verification was obtained by venous blood return.      Post insertion care included: all ports aspirated, all ports flushed easily, guidewire removed intact, Biopatch applied, line sutured in place and dressing applied.    During the procedure the patient experienced: patient tolerated procedure well with no complications.      A non-oximetric, 8 (size) Pulmonary Artery Catheter (PAC) was placed through the Introducer CVL in the right internal jugular vein.  The PAC placement was confirmed by pressure tracing changes and ERIKA.  The patient experienced the following events during the procedure: patient tolerated procedure well with no complications.

## 2024-01-19 NOTE — H&P
[facility]   Cardiothoracic Surgery     History Of Present Illness  Jerel Drummond is a 73 y.o. male with a PMH significant for Severe Calcific Bicuspid Aortic Valve Stenosis, CAD (previous STEMI), HTN, HLD and COPD who presents to [facility] on 1/19/24 for a staged aortic valve replacement.    The patient was referred to cardiac surgery by cardiologist, Dr. Mak Carrillo given the patient's worsening aortic valve stenosis. As the patient was symptomatic with dyspnea & dizziness, in conjunction with previous CVAs, they were felt to be related to his aortic valve.     The patient was seen and assessed by cardiac surgeon, Dr. Fernando Eddy, and a subsequent AVR was staged.      Subjective    Past Medical History  He has a past medical history of Aortic stenosis, Restrepo's esophagus, Cataract, Cerebral vascular accident (CMS/HCC), COPD (chronic obstructive pulmonary disease) (CMS/HCC), Coronary artery disease, Depression, Fracture, ribs, GERD (gastroesophageal reflux disease), HTN (hypertension), Hyperlipidemia, Insomnia, Myocardial infarction (CMS/HCC), Neuropathy, and Sleep apnea.    Surgical History  He has a past surgical history that includes Rib fracture surgery; Cataract extraction; Retinal detachment surgery; Knee Arthroplasty; Hernia repair; and Coronary angioplasty with stent.     Social History  He reports that he has quit smoking. His smoking use included cigarettes. He has never used smokeless tobacco. He reports that he does not currently use alcohol. He reports current drug use. Drug: Marijuana.    Family History  Family History   Problem Relation Name Age of Onset    Stomach cancer Mother          Allergies  Chlorine, Ciprofloxacin, Codeine, Meloxicam, Meperidine, and Clindamycin    Review of Systems   Unable to perform ROS: Intubated             Objective    Physical Exam  Vitals and nursing note reviewed.   Constitutional:       General: He is not in acute  distress.     Appearance: He is ill-appearing. He is not diaphoretic.      Interventions: He is sedated and intubated.   HENT:      Head: Normocephalic and atraumatic.      Nose: Nose normal.      Mouth/Throat:      Mouth: Mucous membranes are dry.      Pharynx: No oropharyngeal exudate.   Eyes:      Extraocular Movements: Extraocular movements intact.      Pupils: Pupils are equal, round, and reactive to light.   Cardiovascular:      Rate and Rhythm: Normal rate and regular rhythm.      Pulses: Normal pulses.      Heart sounds: Normal heart sounds, S1 normal and S2 normal. No murmur heard.     No friction rub. No gallop.   Pulmonary:      Effort: Pulmonary effort is normal. No tachypnea or bradypnea. He is intubated.      Breath sounds: Examination of the right-lower field reveals decreased breath sounds. Examination of the left-lower field reveals decreased breath sounds. Decreased breath sounds present.   Chest:      Chest wall: Tenderness present.   Musculoskeletal:      Cervical back: Normal range of motion and neck supple. No edema.   Neurological:      GCS: GCS eye subscore is 1. GCS verbal subscore is 1. GCS motor subscore is 1.      Cranial Nerves: Cranial nerves 2-12 are intact.     Home Medications  Current Outpatient Medications   Medication Instructions    atorvastatin (LIPITOR) 80 mg, oral, Daily    calcium carbonate-vitamin D3 600 mg-5 mcg (200 unit) tablet 1 tablet, oral, 2 times daily    cetirizine (ZYRTEC) 10 mg, oral, Daily    cholecalciferol (VITAMIN D3) 5,000 Units, oral, Daily    cyanocobalamin (VITAMIN B-12) 500 mcg, oral    escitalopram (Lexapro) 10 mg tablet 1 tablet, oral, 2 times daily    melatonin 10 mg, oral, Nightly    metoprolol succinate XL (TOPROL-XL) 25 mg, oral, Daily    omeprazole (PRILOSEC) 20 mg, oral, Daily before breakfast    ticagrelor (BRILINTA) 90 mg, oral,  TAKE ONE TABLET BY MOUTH TWICE A DAY FOR ACUTE SYNDROME OF THE HEART DATE TICAGRELOR THERAPY STARTED: DEC 10,2022  DATE OF TICAGRELOR REASSESSMENT: DEC 10,2023    zolpidem (AMBIEN) 5 mg, oral, Nightly        Inpatient Medications   Scheduled medications  acetaminophen, 975 mg, oral, q6h  atorvastatin, 80 mg, oral, Daily  calcium carbonate-vitamin D3, 1 tablet, oral, Daily  cholecalciferol, 5,000 Units, oral, Daily  electrolyte-A, 1,000 mL, intravenous, Once  [START ON 1/20/2024] enoxaparin, 40 mg, subcutaneous, Daily  escitalopram, 10 mg, oral, Daily  gabapentin, 100 mg, oral, TID  insulin lispro, 0-15 Units, subcutaneous, q4h  lidocaine, 1 patch, transdermal, q24h  magnesium oxide, 400 mg, oral, Daily  [START ON 1/20/2024] pantoprazole, 40 mg, oral, Daily before breakfast  psyllium, 1 packet, oral, Daily  sennosides-docusate sodium, 2 tablet, oral, BID      Continuous medications  dexmedeTOMIDine, 0-1.5 mcg/kg/hr  EPINEPHrine (Adrenalin) 10 mg in 250 mL (0.04 mg/mL) infusion, 0-2 mcg/kg/min  norepinephrine, 0-3.3 mcg/kg/min, Last Rate: 0.02 mcg/kg/min (01/19/24 1255)      PRN medications  PRN medications: albumin human, bisacodyl, dextrose **OR** glucagon, fentaNYL, magnesium sulfate, magnesium sulfate, naloxone, oxyCODONE, oxyCODONE, oxygen, potassium chloride, potassium chloride      Relevant Results  Labs  Results for orders placed or performed during the hospital encounter of 01/19/24 (from the past 24 hour(s))   Blood Gas Arterial Full Panel Unsolicited   Result Value Ref Range    POCT pH, Arterial 7.44 (H) 7.38 - 7.42 pH    POCT pCO2, Arterial 40 38 - 42 mm Hg    POCT pO2, Arterial 367 (H) 85 - 95 mm Hg    POCT SO2, Arterial 100 94 - 100 %    POCT Oxy Hemoglobin, Arterial 97.2 94.0 - 98.0 %    POCT Hematocrit Calculated, Arterial 38.0 (L) 41.0 - 52.0 %    POCT Sodium, Arterial 136 136 - 145 mmol/L    POCT Potassium, Arterial 4.2 3.5 - 5.3 mmol/L    POCT Chloride, Arterial 106 98 - 107 mmol/L    POCT Ionized Calcium, Arterial 1.20 1.10 - 1.33 mmol/L    POCT Glucose, Arterial 107 (H) 74 - 99 mg/dL    POCT Lactate, Arterial  1.6 0.4 - 2.0 mmol/L    POCT Base Excess, Arterial 2.8 -2.0 - 3.0 mmol/L    POCT HCO3 Calculated, Arterial 27.2 (H) 22.0 - 26.0 mmol/L    POCT Hemoglobin, Arterial 12.5 (L) 13.5 - 17.5 g/dL    POCT Anion Gap, Arterial 7 (L) 10 - 25 mmo/L    Patient Temperature 37.0 degrees Celsius    FiO2 100 %   Blood Gas Arterial Full Panel Unsolicited   Result Value Ref Range    POCT pH, Arterial 7.40 7.38 - 7.42 pH    POCT pCO2, Arterial 39 38 - 42 mm Hg    POCT pO2, Arterial 439 (H) 85 - 95 mm Hg    POCT SO2, Arterial 99 94 - 100 %    POCT Oxy Hemoglobin, Arterial 97.0 94.0 - 98.0 %    POCT Hematocrit Calculated, Arterial 35.0 (L) 41.0 - 52.0 %    POCT Sodium, Arterial 136 136 - 145 mmol/L    POCT Potassium, Arterial 3.7 3.5 - 5.3 mmol/L    POCT Chloride, Arterial 106 98 - 107 mmol/L    POCT Ionized Calcium, Arterial 1.15 1.10 - 1.33 mmol/L    POCT Glucose, Arterial 189 (H) 74 - 99 mg/dL    POCT Lactate, Arterial 2.5 (H) 0.4 - 2.0 mmol/L    POCT Base Excess, Arterial -0.5 -2.0 - 3.0 mmol/L    POCT HCO3 Calculated, Arterial 24.2 22.0 - 26.0 mmol/L    POCT Hemoglobin, Arterial 11.7 (L) 13.5 - 17.5 g/dL    POCT Anion Gap, Arterial 10 10 - 25 mmo/L    Patient Temperature 37.0 degrees Celsius    FiO2 100 %   Blood Gas Arterial Full Panel Unsolicited   Result Value Ref Range    POCT pH, Arterial 7.43 (H) 7.38 - 7.42 pH    POCT pCO2, Arterial 39 38 - 42 mm Hg    POCT pO2, Arterial 320 (H) 85 - 95 mm Hg    POCT SO2, Arterial 99 94 - 100 %    POCT Oxy Hemoglobin, Arterial 96.9 94.0 - 98.0 %    POCT Hematocrit Calculated, Arterial 29.0 (L) 41.0 - 52.0 %    POCT Sodium, Arterial 134 (L) 136 - 145 mmol/L    POCT Potassium, Arterial 3.6 3.5 - 5.3 mmol/L    POCT Chloride, Arterial 103 98 - 107 mmol/L    POCT Ionized Calcium, Arterial 1.08 (L) 1.10 - 1.33 mmol/L    POCT Glucose, Arterial 175 (H) 74 - 99 mg/dL    POCT Lactate, Arterial 2.7 (H) 0.4 - 2.0 mmol/L    POCT Base Excess, Arterial 1.5 -2.0 - 3.0 mmol/L    POCT HCO3 Calculated,  Arterial 25.9 22.0 - 26.0 mmol/L    POCT Hemoglobin, Arterial 9.5 (L) 13.5 - 17.5 g/dL    POCT Anion Gap, Arterial 9 (L) 10 - 25 mmo/L    Patient Temperature 37.0 degrees Celsius    FiO2 70 %   Blood Gas Arterial Full Panel Unsolicited   Result Value Ref Range    POCT pH, Arterial 7.33 (L) 7.38 - 7.42 pH    POCT pCO2, Arterial 52 (H) 38 - 42 mm Hg    POCT pO2, Arterial 322 (H) 85 - 95 mm Hg    POCT SO2, Arterial 100 94 - 100 %    POCT Oxy Hemoglobin, Arterial 97.7 94.0 - 98.0 %    POCT Hematocrit Calculated, Arterial 29.0 (L) 41.0 - 52.0 %    POCT Sodium, Arterial 135 (L) 136 - 145 mmol/L    POCT Potassium, Arterial 5.9 (H) 3.5 - 5.3 mmol/L    POCT Chloride, Arterial 106 98 - 107 mmol/L    POCT Ionized Calcium, Arterial 1.16 1.10 - 1.33 mmol/L    POCT Glucose, Arterial 177 (H) 74 - 99 mg/dL    POCT Lactate, Arterial 1.9 0.4 - 2.0 mmol/L    POCT Base Excess, Arterial 0.9 -2.0 - 3.0 mmol/L    POCT HCO3 Calculated, Arterial 27.4 (H) 22.0 - 26.0 mmol/L    POCT Hemoglobin, Arterial 9.7 (L) 13.5 - 17.5 g/dL    POCT Anion Gap, Arterial 8 (L) 10 - 25 mmo/L    Patient Temperature 37.0 degrees Celsius    FiO2 80 %   Blood Gas Arterial Full Panel Unsolicited   Result Value Ref Range    POCT pH, Arterial 7.36 (L) 7.38 - 7.42 pH    POCT pCO2, Arterial 48 (H) 38 - 42 mm Hg    POCT pO2, Arterial 292 (H) 85 - 95 mm Hg    POCT SO2, Arterial 100 94 - 100 %    POCT Oxy Hemoglobin, Arterial 97.9 94.0 - 98.0 %    POCT Hematocrit Calculated, Arterial 30.0 (L) 41.0 - 52.0 %    POCT Sodium, Arterial 134 (L) 136 - 145 mmol/L    POCT Potassium, Arterial 6.4 (HH) 3.5 - 5.3 mmol/L    POCT Chloride, Arterial 106 98 - 107 mmol/L    POCT Ionized Calcium, Arterial 1.15 1.10 - 1.33 mmol/L    POCT Glucose, Arterial 177 (H) 74 - 99 mg/dL    POCT Lactate, Arterial 1.8 0.4 - 2.0 mmol/L    POCT Base Excess, Arterial 1.2 -2.0 - 3.0 mmol/L    POCT HCO3 Calculated, Arterial 27.1 (H) 22.0 - 26.0 mmol/L    POCT Hemoglobin, Arterial 10.1 (L) 13.5 - 17.5 g/dL     POCT Anion Gap, Arterial 7 (L) 10 - 25 mmo/L    Patient Temperature 37.0 degrees Celsius    FiO2 80 %    Critical Called By [place]     Critical Called To ANESTHESIA     Critical Call Time 1053.0000     Critical Read Back Y    Blood Gas Arterial Full Panel Unsolicited   Result Value Ref Range    POCT pH, Arterial 7.47 (H) 7.38 - 7.42 pH    POCT pCO2, Arterial 44 (H) 38 - 42 mm Hg    POCT pO2, Arterial 390 (H) 85 - 95 mm Hg    POCT SO2, Arterial 100 94 - 100 %    POCT Oxy Hemoglobin, Arterial 97.7 94.0 - 98.0 %    POCT Hematocrit Calculated, Arterial 28.0 (L) 41.0 - 52.0 %    POCT Sodium, Arterial 136 136 - 145 mmol/L    POCT Potassium, Arterial 6.5 (HH) 3.5 - 5.3 mmol/L    POCT Chloride, Arterial 105 98 - 107 mmol/L    POCT Ionized Calcium, Arterial 1.09 (L) 1.10 - 1.33 mmol/L    POCT Glucose, Arterial 250 (H) 74 - 99 mg/dL    POCT Lactate, Arterial 2.2 (H) 0.4 - 2.0 mmol/L    POCT Base Excess, Arterial 7.5 (H) -2.0 - 3.0 mmol/L    POCT HCO3 Calculated, Arterial 32.0 (H) 22.0 - 26.0 mmol/L    POCT Hemoglobin, Arterial 9.3 (L) 13.5 - 17.5 g/dL    POCT Anion Gap, Arterial 6 (L) 10 - 25 mmo/L    Patient Temperature 37.0 degrees Celsius    FiO2 100 %    Critical Called By [place]     Critical Called To ANESTHESIA     Critical Call Time 1115.0000     Critical Read Back Y    Blood Gas Arterial Full Panel Unsolicited   Result Value Ref Range    POCT pH, Arterial 7.44 (H) 7.38 - 7.42 pH    POCT pCO2, Arterial 42 38 - 42 mm Hg    POCT pO2, Arterial 332 (H) 85 - 95 mm Hg    POCT SO2, Arterial 99 94 - 100 %    POCT Oxy Hemoglobin, Arterial 97.5 94.0 - 98.0 %    POCT Hematocrit Calculated, Arterial 29.0 (L) 41.0 - 52.0 %    POCT Sodium, Arterial 136 136 - 145 mmol/L    POCT Potassium, Arterial 5.4 (H) 3.5 - 5.3 mmol/L    POCT Chloride, Arterial 108 (H) 98 - 107 mmol/L    POCT Ionized Calcium, Arterial 1.46 (H) 1.10 - 1.33 mmol/L    POCT Glucose, Arterial 194 (H) 74 - 99 mg/dL    POCT Lactate, Arterial 2.3 (H) 0.4 - 2.0 mmol/L     POCT Base Excess, Arterial 3.9 (H) -2.0 - 3.0 mmol/L    POCT HCO3 Calculated, Arterial 28.5 (H) 22.0 - 26.0 mmol/L    POCT Hemoglobin, Arterial 9.6 (L) 13.5 - 17.5 g/dL    POCT Anion Gap, Arterial 5 (L) 10 - 25 mmo/L    Patient Temperature 37.0 degrees Celsius    FiO2 100 %   Fibrinogen   Result Value Ref Range    Fibrinogen 173 (L) 200 - 400 mg/dL   Magnesium   Result Value Ref Range    Magnesium 2.82 (H) 1.60 - 2.40 mg/dL   Platelet Count   Result Value Ref Range    Platelets 95 (L) 150 - 450 x10*3/uL   Protime-INR   Result Value Ref Range    Protime 17.7 (H) 9.8 - 12.8 seconds    INR 1.6 (H) 0.9 - 1.1   aPTT   Result Value Ref Range    aPTT 37 27 - 38 seconds   POCT GLUCOSE   Result Value Ref Range    POCT Glucose 135 (H) 74 - 99 mg/dL       Cardiology Tests   Cath:   Left Heart Catheterization - 01/19/2023  CONCLUSIONS:   1. Mid RCA Lesion: The percent stenosis is 80%.   2. Mid RCA Lesion: pre-dilation, Resolute Wayne 2.5x12 post-dilation: 10-30% residual stenosis. RCA: pre-procedure EVELYN flow was 3(complete perfusion), post-procedure EVELYN flow was 3(complete perfusion) and the lesion risk category is High/C.           Daily Event     01/19/24: Patient arrived to the ICU in critically ill but stable condition.  S/p AVR. Patient currently intubated with sedation, and arrived AV paced at 90 BPM (underlying rhythm sinus gonsalo in the 50s). Will continue to AV pace at 90 BPM.     Vasopressors      Norepinephrine 0.02 mcg/kg/min    - Vital Signs: HR 90 (AV Paced, underlying sinus gonsalo 50s)  - Hemodynamics: CVP 10, Jennifer CO 3.95, Jennifer CI 2.5, SvO2 68  - Ventilator Settings: PRVC-AC / FiO2 50% / PEEP 5 / RR 12    - Pump time: 117 minutes     Intake & Output:   - Initial L Pleural Chest Tube: 70mL   - Initial Mediastinal Chest Tube: 140mL     Assessment & Plan        Severe Aortic Valve Stenosis   - S/p AVR on 1/19/24 with Dr. Fernando Eddy (25mm Medtronic Avalus Bioprosthetic Valve)  - Continue Atorvastatin 80mg  -  Holding beta-blocker in initial post op period  - Maintain MS/pl chest tubes to -20cm H20 continuous suction   - Daily CXR while chest tube intact   - Plan to start on prophylactic AC once chest tubes are removed        CAD   - S/p PCI to the RCA on 1/2023  - Home Medications: Atorvastatin 80mg, metoprolol succinate 25mg, ticagrelor 90mg BID   - Hold in the immediate post-op period  - TBD restarting ticagrelor given 12 months post-stending        Mediastinal Incision  - Remove post-op dressing on POD #2  - Dressing C/D/I       Acute Post-Op Pain  - As expected   - Multimodal pain control with scheduled magnesium oxide 400mg QD, gabapentin 100mg TID, and acetaminophen 650 q6h (PRN oxycodone & fentanyl)  - Bowel regimen while taking narcotics        Risk for Post-Op Arrhythmia  - Ventricular wires placed  - Discontinue V-wires prior to DC  - Telemetry until discharged      Acute Postoperative Respiratory Insufficiency   - As expected following CABG with post-op atelectasis  - Currently maintained on mechanical ventilator   - Aggressive coughing and deep breathing exercises.   - Incentive spirometry  - Out of bed, early and aggressive mobilization with assistance  - Oxygen as needed, wean to keep saturation above 92%  - ICU intensivist/pulmonologist consulted  - Albuterol nebulizers as needed       Risk for Fluid Volume Overload  - Pre-Op Weight: 182 kg   - Strict I& Os and daily weights    - Monitor CVP and hemodynamics closely       Acute Post-Op Blood-Loss Anemia  - Pre-Op H/H:  14.2/43.3  - Monitor h/h in the initial post op period  - Monitor chest tubes for post op hemorrhage   - Multivitamin/iron tablet   - Daily CBC while in hospital       Post-Op Leukocytosis  - Pre-Op WBC: 7.8  - Reactive to surgery   - Post-op ATB Q12 for 48hrs  - Daily cbc while in hospital        Essential HTN  - Home Medications: Metoprolol succinate 25mg,   - Hold in the immediate post-op period        Risk for Hyperglycemia  - Home  Medications: None  - HbA1c: 5.7  - Goal for BG <150 in the post-operative period  - SSI       Dyslipidemia  - Home Medications: Atorvastatin 80mg   - Continue on statin therapy as above     Risk for Electrolyte Disturbances  - Optimize electrolytes per [facility] protocol      Bowel Regimen  - Senna-S BID   - PRN suppositories and miralax    Prophylaxis  - GI: PPI  - DVT: Elie-Hose & starting enoxaparin on POD #1  - PT/OT eval-once clinically stable     Disposition  - CVICU    Patient seen and plan discussed with Dr. Fernando Eddy.        Yaya Argueta, APRN-CNP

## 2024-01-19 NOTE — ANESTHESIA PROCEDURE NOTES
Arterial Line:    Date/Time: 1/19/2024 7:55 AM    Staffing  Performed: CRNA   Authorized by: Víctor Mueller MD    Performed by: RILEY Chavez-CRNA    An arterial line was placed. Procedure performed using surface landmarks.in the OR for the following indication(s): continuous blood pressure monitoring and blood sampling needed.    A 20 gauge (size), 12 cm (length), Angiocath (type) catheter was placed into the Right brachial artery, secured by TegadermEvents:  patient tolerated procedure well with no complications.      Additional notes:  Atraumatic brachial Iaeger attempt x1.

## 2024-01-19 NOTE — ANESTHESIA POSTPROCEDURE EVALUATION
Patient: Jerel Drummond    Procedure Summary       Date: 01/19/24 Room / Location: [facility] OR  / [facility] OR    Anesthesia Start: 0742 Anesthesia Stop:     Procedure: AORTIC VALVE REPLACEMENT WITH TISSUE GRAFT / PATIENT IS PART OF LEEAP TRIAL AND MAY OR MAY NOT HAVE RECEIVED A CLIP/ LEFT POSTERIOR CARDIAL WINDOW (Chest) Diagnosis:       Nonrheumatic aortic valve stenosis      (Nonrheumatic aortic valve stenosis [I35.0])    Surgeons: Jakub Eddy MD Responsible Provider: Víctor Mueller MD    Anesthesia Type: general, regional ASA Status: 3            Anesthesia Type: general, regional    Vitals Value Taken Time   /85 01/19/24 1324   Temp 36 °C (96.8 °F) 01/19/24 1324   Pulse 94 01/19/24 1324   Resp 14 01/19/24 1324   SpO2 100 % 01/19/24 1324       Anesthesia Post Evaluation    Patient location during evaluation: ICU  Patient participation: complete - patient cannot participate  Level of consciousness: sedated  Pain management: adequate  Airway patency: patent (ETT)  Cardiovascular status: acceptable  Respiratory status: ventilator and ETT  Hydration status: acceptable  Postoperative Nausea and Vomiting: none    No notable events documented.

## 2024-01-19 NOTE — H&P
"ICU H&P      Jerel Drummond is a 73 y.o. male with a PMHx of severe aortic valve stenosis, CAD, HTN, hyperlipidemia, COPD admitted to CVICU for postoperative management of aortic valve replacement.     The patient is intubated and receiving mechanical ventilation, with vasoplegia and requiring norepinephrine and epinephrine.     Mr Drummond is arousable with touch stimulation, follows commands, moves 4 limbs.   Subjective   A 10 point ROS was not able to be performed due to patient being lethargic  He is able to nod \"no\" when I ask him if he is in pain     Past Medical History:   Diagnosis Date    Aortic stenosis     Restrepo's esophagus     Cataract     Cerebral vascular accident (CMS/HCC)     COPD (chronic obstructive pulmonary disease) (CMS/HCC)     Coronary artery disease     Depression     Fracture, ribs     GERD (gastroesophageal reflux disease)     HTN (hypertension)     Hyperlipidemia     Insomnia     Myocardial infarction (CMS/HCC)     Neuropathy     Sleep apnea       Past Surgical History:   Procedure Laterality Date    CATARACT EXTRACTION      CORONARY ANGIOPLASTY WITH STENT PLACEMENT      HERNIA REPAIR      KNEE ARTHROPLASTY      RETINAL DETACHMENT SURGERY      RIB FRACTURE SURGERY       Family History   Problem Relation Name Age of Onset    Stomach cancer Mother       Social History     Socioeconomic History    Marital status:      Spouse name: None    Number of children: None    Years of education: None    Highest education level: None   Occupational History    None   Tobacco Use    Smoking status: Former     Types: Cigarettes    Smokeless tobacco: Never   Substance and Sexual Activity    Alcohol use: Not Currently    Drug use: Yes     Types: Marijuana     Comment: twice per week for foot neuropathy    Sexual activity: Defer   Other Topics Concern    None   Social History Narrative    None     Social Determinants of Health     Financial Resource Strain: Not on file   Food Insecurity: No Food " "Insecurity (12/4/2023)    Hunger Vital Sign     Worried About Running Out of Food in the Last Year: Never true     Ran Out of Food in the Last Year: Never true   Transportation Needs: Not on file   Physical Activity: Not on file   Stress: Not on file   Social Connections: Not on file   Intimate Partner Violence: Not on file   Housing Stability: Not on file    denies smoking, EtOH, illicit drug use  Code Status: Full Code    Objective   /85   Pulse 94   Temp 36 °C (96.8 °F) (Skin)   Resp 20   Ht 1.829 m (6' 0.01\")   Wt 108 kg (237 lb 7 oz)   SpO2 98%   BMI 32.19 kg/m²     Medications:  Current Outpatient Medications   Medication Instructions    atorvastatin (LIPITOR) 80 mg, oral, Daily    calcium carbonate-vitamin D3 600 mg-5 mcg (200 unit) tablet 1 tablet, oral, 2 times daily    cetirizine (ZYRTEC) 10 mg, oral, Daily    cholecalciferol (VITAMIN D3) 5,000 Units, oral, Daily    cyanocobalamin (VITAMIN B-12) 500 mcg, oral    escitalopram (Lexapro) 10 mg tablet 1 tablet, oral, 2 times daily    melatonin 10 mg, oral, Nightly    metoprolol succinate XL (TOPROL-XL) 25 mg, oral, Daily    omeprazole (PRILOSEC) 20 mg, oral, Daily before breakfast    ticagrelor (BRILINTA) 90 mg, oral,  TAKE ONE TABLET BY MOUTH TWICE A DAY FOR ACUTE SYNDROME OF THE HEART DATE TICAGRELOR THERAPY STARTED: DEC 10,2022 DATE OF TICAGRELOR REASSESSMENT: DEC 10,2023    zolpidem (AMBIEN) 5 mg, oral, Nightly     Inpatient scheduled medications:  acetaminophen, 975 mg, oral, q6h  atorvastatin, 80 mg, oral, Daily  calcium carbonate-vitamin D3, 1 tablet, oral, Daily  cholecalciferol, 5,000 Units, oral, Daily  electrolyte-A, 1,000 mL, intravenous, Once  [START ON 1/20/2024] enoxaparin, 40 mg, subcutaneous, Daily  [START ON 1/20/2024] escitalopram, 10 mg, oral, BID  gabapentin, 100 mg, oral, TID  insulin lispro, 0-15 Units, subcutaneous, q4h  lidocaine, 1 patch, transdermal, q24h  magnesium oxide, 400 mg, oral, Daily  [START ON 1/20/2024] " pantoprazole, 40 mg, oral, Daily before breakfast  psyllium, 1 packet, oral, Daily  sennosides-docusate sodium, 2 tablet, oral, BID     Inpatient PRN medications:  PRN medications: albumin human, bisacodyl, dextrose **OR** glucagon, fentaNYL, magnesium sulfate, magnesium sulfate, naloxone, oxyCODONE, oxyCODONE, oxygen, potassium chloride, potassium chloride  Inpatient continuous medications:  dexmedeTOMIDine, 0-1.5 mcg/kg/hr  EPINEPHrine (Adrenalin) 10 mg in 250 mL (0.04 mg/mL) infusion, 0-2 mcg/kg/min  norepinephrine, 0-3.3 mcg/kg/min, Last Rate: 0.02 mcg/kg/min (01/19/24 1255)         Physical Exam:   GENERAL: Lethargic, sedated, intubated and receiving mechanical ventilation   HEAD:  Normocephalic, atraumatic.  EYES:  Round and reactive. Anicteric.  ENT:  No nasal discharge, mucous membranes moist and pink.  NECK:  Atraumatic, no meningismus. No JVD, cervical lymphadenopathy bilaterally.  CARDIOVASCULAR: Regular rhythm, no murmurs   RESPIRATORY: Bilateral breath sounds, intubated and receiving mechanical ventilation   ABDOMEN:  Soft, no tenderness, nondistended, hypoactive bowel sounds.  EXTREMITIES:  No peripheral edema, no calf tenderness. Peripheral pulses intact.  SKIN:  Warm and dry. No tenting. No rash or open wound noted.  NEUROLOGICAL:  Nonfocal grossly.  A&O x 4. CN II-XII grossly intact.    Assessment/Plan     Postoperative period of Aortic valve replacement with bioprosthetic valve    1) Acute respiratory failure in the postoperative period  --Continue mechanical ventilation with lung protective strategy  --Sedation with dexmedethomidine  --Analgesia with fentanyl PRN + acetaminophen, gabapentin, magnesium and lidocaine patch    2) Vasoplegic shock post cardiopulmonary bypass exposure   --Cardiac index:  2.5   --Management with norepinephrine     3) At risk of complete heart block  --Has ventricular pacemaker wires    4) Hypertension   --Antihypetensives on hold due to shock    5) Diabetes  mellitus  --Mangement with insulin     6) Hyperlipidemia  --Continue statin     7) Coronary artery disease  --Continue       Checklist:  Antimicrobials:   Oxygen:   Feeding:   Fluids:   DVT ppx:  Enoxaparin SQ  Ulcer ppx:  Receiving pantoprazole  Glycemic control: Insulin   Bowel care:  Psyllium and sennosides-docusate  Indwelling catheters:   Lines:   Consults:  Code Status: Full code      Valentino Marin DO   Internal Medicine PGY-1     This is a preliminary note written by the resident. Please wait for attending addendum for finalization of note and recommendations.        CRITICAL CARE MEDICINE ATTENDING NOTE    I saw and evaluated the patient. I personally obtained the key and critical portions of the history and physical examination. I reviewed the resident's documentation and discussed the patient with the resident. I agree with the resident's medical decision making as documented in the resident's note.    The patient has high probability of sudden, clinically significant deterioration, which requires urgent interventions. I managed/supervised life supporting interventions that required frequent physician assessment. I spent 40 critical care minutes of my full attention on this patient's management and direct patient care. Time I spent with family or surrogate(s) is included if the patient was incapable of providing information or participating in medical decision making. Time devoted to teaching and to any procedures I billed separately is not included. Medical issues requiring critical care management include:    Brinda Machado MD  Pulmonary and Critical Care Medicine

## 2024-01-19 NOTE — OP NOTE
AORTIC VALVE REPLACEMENT WITH TISSUE GRAFT / PATIENT IS PART OF LEEAP TRIAL AND MAY OR MAY NOT HAVE RECEIVED A CLIP/ LEFT POSTERIOR CARDIAL WINDOW Operative Note     Date: 2024  OR Location: [facility] OR    Name: Jerel Drummond, : 1950, Age: 73 y.o., MRN: [id number], Sex: male    Diagnosis  Pre-op Diagnosis     * Nonrheumatic aortic valve stenosis [I35.0] Post-op Diagnosis     * Nonrheumatic aortic valve stenosis [I35.0]     Procedures  AORTIC VALVE REPLACEMENT WITH TISSUE GRAFT / PATIENT IS PART OF LEEAP TRIAL AND MAY OR MAY NOT HAVE RECEIVED A CLIP/ LEFT POSTERIOR CARDIAL WINDOW  08468 - CO RPLCMT PROST AORTIC VALVE OPEN XCP HOMOGRF/STENT    1. Aortic valve replacement, 25 mm Medtronic Avalus 400 Bioprosthesis (SN: [id number]).  2. LeAAPS trial  3. Left posterior pericardial window creation.  4. Ligation, Ligament of Hang  5. Cannulation and initiation of cardiopulmonary oxygenator.      Surgeons      * Jakub Eddy - Primary    Resident/Fellow/Other Assistant:  Surgeon(s) and Role:  Ceasar Connolly, SA; Chel[facility] Redman [facility]; Kenneth L[facility]    Procedure Summary  Anesthesia: General  ASA: III  Anesthesia Staff: Anesthesiologist: Víctor Mueller MD  CRNA: RILEY Chavez-CRNA  Perfusionist: José Miguel Early  Estimated Blood Loss: 250 mL  Intra-op Medications:   Medication Name Total Dose   sodium chloride 0.9 % irrigation solution 1,000 mL   vancomycin (Vancocin) vial for injection 4 g   norepinephrine (Levophed) 8 mg in sodium chloride 0.9% 250 mL (0.032 mg/mL) infusion (premix) 0.23 mg   ceFAZolin in dextrose (iso-os) (Ancef) IVPB  - Omnicell Override Pull Cannot be calculated   vancomycin (Vancocin) in dextrose 5% water  - Omnicell Override Pull Cannot be calculated   albumin human 5 % infusion 12.5 g 12.5 g              Anesthesia Record               Intraprocedure I/O Totals          Intake    PLATELETS 518.00 mL    Dexmedetomidine 0.00 mL    The total shown is the total volume documented  since Anesthesia Start was filed.    NaCl 0.9 % bolus 200.00 mL    Norepinephrine Drip 0.00 mL    The total shown is the total volume documented since Anesthesia Start was filed.    Nitroglycerin Drip 0.00 mL    The total shown is the total volume documented since Anesthesia Start was filed.    LR infusion 2500.00 mL    Cell Saver 560 mL    Total Intake 3778 mL       Output    Urine 1100 mL    Est. Blood Loss 500 mL    Total Output 1600 mL       Net    Net Volume 2178 mL          Specimen:   ID Type Source Tests Collected by Time   1 : AORTIC LEAFLETS Tissue AORTIC VALVE SURGICAL PATHOLOGY EXAM Jakub Eddy MD 1/19/2024 1019        Staff:   Circulator: Corina Lara RN; Alina Howe RN  Relief Circulator: Janis Mccarthy RN  Relief Scrub: Caren Pelayo  Scrub Person: Kayleigh Vargas; Marsha Grajeda         Drains and/or Catheters:   Chest Tube 1 Mediastinal 28 Fr (Active)       Chest Tube 2 Left Pleural 28 Fr (Active)       NG/OG/Feeding Tube OG - [place] sump 18 Fr Center mouth (Active)       Urethral Catheter Temperature probe 14 Fr. (Active)       Tourniquet Times: CPB: 117  XCT: 80        Implants:  Implants       Type Name Action Serial No.      Other Cardiac Implant DELIVERY SET, CARDIOPLEGIA, W/ ARREST AGENT & CASSETTES - SN/A - [id number] Used, Not Implanted N/A     Other Cardiac Implant GUIDE, SELECTION, GILLINOV-UNA ATRICLIP - [id number] Implanted      Other Cardiac Implant ATRICLIP, FLEX-V DEVICE, 35MM STERILE, ACHV35 - [id number] Implanted      Heart Valve Repair VALVE, AORTIC AVALUS, 25MM - [id number] Implanted [id number]     Other Cardiac Implant CARDIOPLEGIA SET - [id number] Used, Not Implanted               Findings: The ascending aorta was free of calcifications.  The right and left ventricular function was normal.  There was severe Aortic stenosis with fusion of the right and left cusps.  There was friable and ulcerated calcifications of all leaflets.  The patient  from CPB  without inotrope or vasopressors. There was no rick-valvular leak, the new valve was functioning well.     The patient participated in the LeAAPS trial, was consented and randomized.  He may or may not have received an AtriClip based on this randomization.     Indications: Jerel Drummond is an 73 y.o. male who is having surgery for Nonrheumatic aortic valve stenosis [I35.0]. Mr. Jerel Drummond is an 73 y.o. male, who is a patient of Dr.John Giovanna Middleton MD   I have been asked to see them by Mak Jensen to evaluate aortic stenosis.  He has had worsening dyspnea, dizziness with activity.  He underwent ECHO and LHC in December and again in January at [facility] with STEMI. He has known about the aortic stenosis for years, and undergoing serial surveillance echocardiography. Upon evaluation by the [facility] selection committee, he has a heavily calcified bicuspid aortic valve, and would be best served with a surgical approach.  He is calling today to go over specifics of surgical recommendations. He has fatigue as his biggest symptom. He denies CP, syncope or PND. Mr. Jerel Drummond is an 73 y.o. male who presents with symptomatic severe aortic stenosis.  This is associated with HTN, HLD; and in the setting of COPD.  Their symptoms include Dyspnea and Fatigue.  Their imaging is consistent with symptomatic severe aortic stenosis.      We had a nice discussion regarding the indications for intervention on their valvular disease.  This included percutaneous as well as open surgical approaches.  He understands that the goal of this procedure will be to relieve symptoms if present, preserve left ventricular function, and prolong life.  I discussed the specific risks of vascular access bleeding, stroke and need for pacemaker placement. We discussed that given his BAV and surgical fitness, the D selection committee and I, thought that a surgical approach would be in his best interest.  He is interested in a  tissue valve.  We  have settled on 1/19/23 for a surgical date.    The patient was seen in the preoperative area. The risks, benefits, complications, treatment options, non-operative alternatives, expected recovery and outcomes were discussed with the patient. The possibilities of reaction to medication, pulmonary aspiration, injury to surrounding structures, bleeding, recurrent infection, the need for additional procedures, failure to diagnose a condition, and creating a complication requiring transfusion or operation were discussed with the patient. The patient concurred with the proposed plan, giving informed consent.  The site of surgery was properly noted/marked if necessary per policy. The patient has been actively warmed in preoperative area. Preoperative antibiotics have been ordered and given within 1 hours of incision.     Procedure Details: After informed consent was obtained and all questions asked and answered to the patient's satisfaction and after positive identification, they were brought to the operative theatre.  They were placed on the operating room table in the supine position, had an arterial monitoring line placed, and induction of anesthesia with orotracheal intubation.  An internal jugular central venous line was placed. A Stockton-Sherine catheter was then floated into the pulmonary artery. A transesophageal echocardiography probe was placed in the stomach without event.  They were then prepped and draped in a standard sterile surgical fashion.    A surgical timeout was performed with the correct patient, the correct procedure, availability of blood, timing and dosage of antibiotic administration, correct position, laterality, allergies, and availability of needed equipment were all verified prior to beginning the case.    A midline incision was made on the chest, a sternotomy was performed using a reciprocating sternal saw.   The pericardium was incised and reflected laterally, full dose heparin  administration was performed.  The patient was then cannulated in the ascending aorta with a 20 Lithuanian EOPA  and right atrium a two-stage venous cannula. A nonselective antegrade cardioplegia needle was placed in the ascending aorta.  A retrograde coronary sinus catheter was placed.  The patient then had initiation of cardiopulmonary bypass 2.2 L/min/m².  An LV vent was placed via the right superior pulmonary vein.     An aortic cross-clamp was applied, and cold blood cardioplegia was delivered antegrade via the root non-selectively.  Initially 1000 mL was delivered, and then we switched to retrograde cardioplegia via the coronary sinus catheter for a total of 1400 mL of cardioplegia delivered.  Throughout the case, every 15 minutes, the patient had intermittent cardioplegia administered retrograde via the coronary sinus.    Ligament of Hang and Left Posterior Pericardial Window:  The heart was then medially rotated over into the right chest.  The ligament of Hang was divided using electrocautery. The phrenic nerve was identified on the left.  A 3 cm ellipse pericardial window was created, below the phrenic nerve, into the left pleural space.    Aortic Valve Replacement:   Attention was then placed to the ascending aorta. The ascending aorta was entered using Metzenbaum scissors, a hockey-stick incision was taken down to the noncoronary sinus. Retraction sutures were placed in the aorta with 4-0 polypropylene suture with Rodney felt reinforcement. The aortic valve leaflets were excised sharply, the left ventricular outflow tract was copiously irrigated with normal saline. The aortic annulus was sized, and 2-0 Tycron sutures with Rodney felt reinforcement were placed in a mattress fashion around the aortic annulus. Appropriately sized above valve was washed and prepared. Annular sutures were then passed through the sewing skirt of the valvular prosthesis, this was parachuted down to the aortic annulus, and  each were individually tied using a cor knot cinch device. The right and the left coronary ostia were verified to be above the sewing skirt. The retraction sutures were removed, and the aortotomy was closed with 4-0 polypropylene suture with Rodney felt reinforcement in a standard [place] fashion.     The patient was then placed in Trendelenburg, the heart was fully de-aired, and the aortic cross-clamp was removed with the root vent on. The retrograde coronary sinus catheter was removed from the free wall of the right atrium.     Right atrial and ventricular pacing wires were inserted.  The heart was fully de-aired.  Once the heart was fully de-aired, ascending aorta root vent was removed, and repaired with 4-0 polypropylene suture. The patient was weaned from cardiopulmonary bypass without any difficulties.  The atrial cannula was removed, and the site were repaired with 4-0 polypropylene suture.  The LV vent was removed and the site repaired with 4-0 polypropylene suture. The root vent was removed and the site repaired with 4-0 pledgeted polypropylene suture.  Protamine was administered.  The ascending aorta perfusion cannula, was removed, and the site repaired with 4-0 polypropylene pledgeted suture.  Hemostasis was performed.  When hemostasis was satisfactory, the field was irrigated with saline.  All the surgical sites were checked, and they were hemostatic.  Two  #28 Canadian Jatinder drains were placed. One was placed in the anterior mediastinum, the second was placed in the posterior pericardial well, through the left pericardial window, into the left pleural space. The pericardium was reapproximated using interrupted 3-0 silk suture.  The sternum was closed with #7 and double #6 stainless steel surgical wire.  The fascia was closed with 0 Polysorb suture.  The subcutaneous tissue was closed with 2-0 Polysorb suture in a running fashion.  The skin was closed with 3-0 Polysorb suture in a running fashion.  The  needle instrument and sponge count were correct x2, the patient tolerated this procedure well, and was transferred to the intensive care unit in stable condition.    Complications:  None; patient tolerated the procedure well.    Disposition: ICU - intubated and hemodynamically stable.  Condition: stable     Attending Attestation: I was present and scrubbed for the entire procedure.    Jakub Eddy  Phone Number: [phone]

## 2024-01-19 NOTE — NURSING NOTE
1345: Patient arrived in CVICU 176.     1440: Patchescobar NP made aware of low Cardiac output & index. Verbal order to bolus 500 ml of Plasmalyte.     1550: Per Ellie NP, ok to give another 500 ml bolus of plasmalyte for hypotension.     1530: Per Ellie NP, Jennifer calculation done. Cardiac Index 2.5 & Cardiac output 3.95.     1840: Ellie NP made aware of nausea. Zofran given per order.     1917: Patient extubated by RT. Placed on 4L NC. Tolerated well. Voice intact. NO signs or symptoms of distress.       Block Charting Note    Medication: Norepinephrine  Time block charting initiated: 1500  Starting dose: 0.02  Ending dose: 0.05  Maximum dose: 0.05  Time block charting completed: 1800  Physiologic parameters: See orders

## 2024-01-19 NOTE — ANESTHESIA PROCEDURE NOTES
Airway  Date/Time: 1/19/2024 8:00 AM  Urgency: elective    Airway not difficult    Staffing  Performed: CRNA   Authorized by: Víctor Mueller MD    Performed by: RILEY Chavez-CRNA  Patient location during procedure: OR    Indications and Patient Condition  Indications for airway management: anesthesia  Spontaneous Ventilation: absent  Sedation level: deep  Preoxygenated: yes  Patient position: sniffing  Mask difficulty assessment: 1 - vent by mask  No planned trial extubation    Final Airway Details  Final airway type: endotracheal airway      Successful airway: ETT  Cuffed: yes   Successful intubation technique: video laryngoscopy (elective Glidescope 4 use)  Facilitating devices/methods: intubating stylet  Endotracheal tube insertion site: oral  Blade type: Glidescope 4.  Blade size: #4  ETT size (mm): 8.0  Cormack-Lehane Classification: grade I - full view of glottis  Placement verified by: chest auscultation, capnometry and palpation of cuff   Measured from: lips  ETT to lips (cm): 22  Number of attempts at approach: 1  Ventilation between attempts: none  Number of other approaches attempted: 0    Additional Comments  Elective Glidescope 4 use. Atraumatic intubation attempt x1.

## 2024-01-19 NOTE — RESEARCH NOTES
SUBJECT ID:  [id number]       Informed Consent Documentation -  Tool  Protocol Title: [facility]  IRB # [id number]  : Jakub Eddy MD    Participant ID:   [id number]     Visit Date:  1/19/2024     Date of first contact with participant regarding study:   1/19/2024     Informed Consent (ICF)/HIPAA Obtained by: (must be CITI certified)  Taylor Garibay   Date Signed:  1/19/2024   Time Signed:  07:17 a.m.   Individuals present during the informed consent process:  Patient's wife and one other member of family     Did participant meet inclusion/exclusion criteria:  [x] YES  [] NO  [] PENDING     Date and Time Research activities began:   1/19/2024 at 07:17 a.m.     Did the participant sign and date the ICF before any study procedures were performed  [x] YES  [] NO    Was the participant given adequate time to review the ICF and ask questions?  [x] YES  [] NO    Did the participant verbalize an understanding of the main purpose of study, (procedures, follow-up, risk etc.)  [x] YES  [] NO    Were the participants's questions answered to his/her satisfaction?  [x] YES  [] NO    Were other involved in the decision making?  [] YES  If Yes, who?      [x] NO    Was the participant given a current, stamped copy of the ICF?  [x] YES  [] NO  If Yes, please list the version and expiration date below:  Version   19   Expiration Date  11/21/2024     ADDITIONAL DOCUMENTATION     The participant was entered on the Enrollment Log.  [x] YES  [] NO    A copy of the consent form is filed in the  medical record.  [x] YES  [] NO    The current [facility] IRB consent template version is being used and appropriate signature blocks are present (I.e., LAR, next of kin , one parent, two parents, etc.)  [x] YES  [] NO    FORM COMPLETED BY: TAYLOR GARIBAY    DATE:   1/19/2024

## 2024-01-20 ENCOUNTER — HOSPITAL ENCOUNTER (OUTPATIENT)
Dept: CARDIOLOGY | Facility: HOSPITAL | Age: 74
Discharge: HOME | End: 2024-01-20
Payer: MEDICARE

## 2024-01-20 ENCOUNTER — APPOINTMENT (OUTPATIENT)
Dept: RADIOLOGY | Facility: HOSPITAL | Age: 74
DRG: 219 | End: 2024-01-20
Payer: MEDICARE

## 2024-01-20 DIAGNOSIS — I24.9 ACS (ACUTE CORONARY SYNDROME) (MULTI): ICD-10-CM

## 2024-01-20 LAB
ALBUMIN SERPL BCP-MCNC: 3.2 G/DL (ref 3.4–5)
ANION GAP BLDA CALCULATED.4IONS-SCNC: 10 MMO/L (ref 10–25)
ANION GAP BLDA CALCULATED.4IONS-SCNC: 10 MMO/L (ref 10–25)
ANION GAP SERPL CALC-SCNC: 8 MMOL/L (ref 10–20)
APPARATUS: ABNORMAL
BASE EXCESS BLDA CALC-SCNC: 0.9 MMOL/L (ref -2–3)
BASE EXCESS BLDA CALC-SCNC: 1.2 MMOL/L (ref -2–3)
BASE EXCESS BLDMV CALC-SCNC: -0.4 MMOL/L (ref -2–3)
BASE EXCESS BLDMV CALC-SCNC: -0.8 MMOL/L (ref -2–3)
BASE EXCESS BLDMV CALC-SCNC: -2.7 MMOL/L (ref -2–3)
BASE EXCESS BLDMV CALC-SCNC: 0.1 MMOL/L (ref -2–3)
BASE EXCESS BLDMV CALC-SCNC: 1.5 MMOL/L (ref -2–3)
BODY TEMPERATURE: 37 DEGREES CELSIUS
BUN SERPL-MCNC: 22 MG/DL (ref 6–23)
CA-I BLD-SCNC: 1.12 MMOL/L (ref 1.1–1.33)
CA-I BLDA-SCNC: 1.12 MMOL/L (ref 1.1–1.33)
CA-I BLDA-SCNC: 1.18 MMOL/L (ref 1.1–1.33)
CALCIUM SERPL-MCNC: 8 MG/DL (ref 8.6–10.3)
CHLORIDE BLDA-SCNC: 102 MMOL/L (ref 98–107)
CHLORIDE BLDA-SCNC: 105 MMOL/L (ref 98–107)
CHLORIDE SERPL-SCNC: 107 MMOL/L (ref 98–107)
CO2 SERPL-SCNC: 27 MMOL/L (ref 21–32)
CREAT SERPL-MCNC: 0.92 MG/DL (ref 0.5–1.3)
EGFRCR SERPLBLD CKD-EPI 2021: 87 ML/MIN/1.73M*2
ERYTHROCYTE [DISTWIDTH] IN BLOOD BY AUTOMATED COUNT: 14.5 % (ref 11.5–14.5)
FLOW: 2 LPM
GLUCOSE BLD MANUAL STRIP-MCNC: 121 MG/DL (ref 74–99)
GLUCOSE BLD MANUAL STRIP-MCNC: 136 MG/DL (ref 74–99)
GLUCOSE BLD MANUAL STRIP-MCNC: 140 MG/DL (ref 74–99)
GLUCOSE BLD MANUAL STRIP-MCNC: 147 MG/DL (ref 74–99)
GLUCOSE BLD MANUAL STRIP-MCNC: 165 MG/DL (ref 74–99)
GLUCOSE BLD MANUAL STRIP-MCNC: 78 MG/DL (ref 74–99)
GLUCOSE BLDA-MCNC: 135 MG/DL (ref 74–99)
GLUCOSE BLDA-MCNC: 137 MG/DL (ref 74–99)
GLUCOSE SERPL-MCNC: 146 MG/DL (ref 74–99)
HCO3 BLDA-SCNC: 25.2 MMOL/L (ref 22–26)
HCO3 BLDA-SCNC: 26 MMOL/L (ref 22–26)
HCO3 BLDMV-SCNC: 22.6 MMOL/L (ref 22–26)
HCO3 BLDMV-SCNC: 23.6 MMOL/L (ref 22–26)
HCO3 BLDMV-SCNC: 24.3 MMOL/L (ref 22–26)
HCO3 BLDMV-SCNC: 25.9 MMOL/L (ref 22–26)
HCO3 BLDMV-SCNC: 27.7 MMOL/L (ref 22–26)
HCT VFR BLD AUTO: 30.6 % (ref 41–52)
HCT VFR BLD EST: 30 % (ref 41–52)
HCT VFR BLD EST: 30 % (ref 41–52)
HGB BLD-MCNC: 9.9 G/DL (ref 13.5–17.5)
HGB BLDA-MCNC: 10.1 G/DL (ref 13.5–17.5)
HGB BLDA-MCNC: 9.9 G/DL (ref 13.5–17.5)
INHALED O2 CONCENTRATION: 28 %
INHALED O2 CONCENTRATION: 36 %
INHALED O2 CONCENTRATION: 36 %
LACTATE BLDA-SCNC: 1.5 MMOL/L (ref 0.4–2)
LACTATE BLDA-SCNC: 1.8 MMOL/L (ref 0.4–2)
MAGNESIUM SERPL-MCNC: 2.04 MG/DL (ref 1.6–2.4)
MCH RBC QN AUTO: 30.4 PG (ref 26–34)
MCHC RBC AUTO-ENTMCNC: 32.4 G/DL (ref 32–36)
MCV RBC AUTO: 94 FL (ref 80–100)
NRBC BLD-RTO: 0 /100 WBCS (ref 0–0)
OXYHGB MFR BLDA: 93.6 % (ref 94–98)
OXYHGB MFR BLDA: 96.1 % (ref 94–98)
OXYHGB MFR BLDMV: 49.5 % (ref 45–75)
OXYHGB MFR BLDMV: 54 % (ref 45–75)
OXYHGB MFR BLDMV: 55.5 % (ref 45–75)
OXYHGB MFR BLDMV: 59.5 % (ref 45–75)
OXYHGB MFR BLDMV: 77.9 % (ref 45–75)
PCO2 BLDA: 38 MM HG (ref 38–42)
PCO2 BLDA: 41 MM HG (ref 38–42)
PCO2 BLDMV: 34 MM HG (ref 41–51)
PCO2 BLDMV: 40 MM HG (ref 41–51)
PCO2 BLDMV: 41 MM HG (ref 41–51)
PCO2 BLDMV: 48 MM HG (ref 41–51)
PCO2 BLDMV: 49 MM HG (ref 41–51)
PH BLDA: 7.41 PH (ref 7.38–7.42)
PH BLDA: 7.43 PH (ref 7.38–7.42)
PH BLDMV: 7.34 PH (ref 7.33–7.43)
PH BLDMV: 7.36 PH (ref 7.33–7.43)
PH BLDMV: 7.36 PH (ref 7.33–7.43)
PH BLDMV: 7.38 PH (ref 7.33–7.43)
PH BLDMV: 7.45 PH (ref 7.33–7.43)
PHOSPHATE SERPL-MCNC: 3.2 MG/DL (ref 2.5–4.9)
PLATELET # BLD AUTO: 102 X10*3/UL (ref 150–450)
PO2 BLDA: 70 MM HG (ref 85–95)
PO2 BLDA: 84 MM HG (ref 85–95)
PO2 BLDMV: 31 MM HG (ref 35–45)
PO2 BLDMV: 32 MM HG (ref 35–45)
PO2 BLDMV: 33 MM HG (ref 35–45)
PO2 BLDMV: 36 MM HG (ref 35–45)
PO2 BLDMV: 44 MM HG (ref 35–45)
POTASSIUM BLDA-SCNC: 4.3 MMOL/L (ref 3.5–5.3)
POTASSIUM BLDA-SCNC: 4.6 MMOL/L (ref 3.5–5.3)
POTASSIUM SERPL-SCNC: 4.4 MMOL/L (ref 3.5–5.3)
RBC # BLD AUTO: 3.26 X10*6/UL (ref 4.5–5.9)
SAO2 % BLDA: 97 % (ref 94–100)
SAO2 % BLDA: 99 % (ref 94–100)
SAO2 % BLDMV: 51 % (ref 45–75)
SAO2 % BLDMV: 56 % (ref 45–75)
SAO2 % BLDMV: 57 % (ref 45–75)
SAO2 % BLDMV: 61 % (ref 45–75)
SAO2 % BLDMV: 80 % (ref 45–75)
SODIUM BLDA-SCNC: 133 MMOL/L (ref 136–145)
SODIUM BLDA-SCNC: 136 MMOL/L (ref 136–145)
SODIUM SERPL-SCNC: 138 MMOL/L (ref 136–145)
SPECIMEN DRAWN FROM PATIENT: ABNORMAL
WBC # BLD AUTO: 10.8 X10*3/UL (ref 4.4–11.3)

## 2024-01-20 PROCEDURE — 37799 UNLISTED PX VASCULAR SURGERY: CPT | Performed by: NURSE PRACTITIONER

## 2024-01-20 PROCEDURE — 93005 ELECTROCARDIOGRAM TRACING: CPT

## 2024-01-20 PROCEDURE — P9045 ALBUMIN (HUMAN), 5%, 250 ML: HCPCS | Mod: JZ

## 2024-01-20 PROCEDURE — 82947 ASSAY GLUCOSE BLOOD QUANT: CPT

## 2024-01-20 PROCEDURE — 85027 COMPLETE CBC AUTOMATED: CPT | Performed by: NURSE PRACTITIONER

## 2024-01-20 PROCEDURE — 2500000001 HC RX 250 WO HCPCS SELF ADMINISTERED DRUGS (ALT 637 FOR MEDICARE OP): Performed by: NURSE PRACTITIONER

## 2024-01-20 PROCEDURE — 83735 ASSAY OF MAGNESIUM: CPT | Performed by: NURSE PRACTITIONER

## 2024-01-20 PROCEDURE — 97166 OT EVAL MOD COMPLEX 45 MIN: CPT | Mod: GO

## 2024-01-20 PROCEDURE — 84132 ASSAY OF SERUM POTASSIUM: CPT | Performed by: NURSE PRACTITIONER

## 2024-01-20 PROCEDURE — 71045 X-RAY EXAM CHEST 1 VIEW: CPT

## 2024-01-20 PROCEDURE — 82805 BLOOD GASES W/O2 SATURATION: CPT | Performed by: NURSE PRACTITIONER

## 2024-01-20 PROCEDURE — 99291 CRITICAL CARE FIRST HOUR: CPT

## 2024-01-20 PROCEDURE — 2500000002 HC RX 250 W HCPCS SELF ADMINISTERED DRUGS (ALT 637 FOR MEDICARE OP, ALT 636 FOR OP/ED)

## 2024-01-20 PROCEDURE — 2020000001 HC ICU ROOM DAILY

## 2024-01-20 PROCEDURE — 82330 ASSAY OF CALCIUM: CPT | Performed by: NURSE PRACTITIONER

## 2024-01-20 PROCEDURE — P9045 ALBUMIN (HUMAN), 5%, 250 ML: HCPCS | Mod: JZ | Performed by: NURSE PRACTITIONER

## 2024-01-20 PROCEDURE — 2500000002 HC RX 250 W HCPCS SELF ADMINISTERED DRUGS (ALT 637 FOR MEDICARE OP, ALT 636 FOR OP/ED): Performed by: NURSE PRACTITIONER

## 2024-01-20 PROCEDURE — 71045 X-RAY EXAM CHEST 1 VIEW: CPT | Performed by: RADIOLOGY

## 2024-01-20 PROCEDURE — 93010 ELECTROCARDIOGRAM REPORT: CPT | Performed by: INTERNAL MEDICINE

## 2024-01-20 PROCEDURE — 97162 PT EVAL MOD COMPLEX 30 MIN: CPT | Mod: GP

## 2024-01-20 PROCEDURE — 99291 CRITICAL CARE FIRST HOUR: CPT | Performed by: STUDENT IN AN ORGANIZED HEALTH CARE EDUCATION/TRAINING PROGRAM

## 2024-01-20 PROCEDURE — 2500000001 HC RX 250 WO HCPCS SELF ADMINISTERED DRUGS (ALT 637 FOR MEDICARE OP)

## 2024-01-20 PROCEDURE — 2500000001 HC RX 250 WO HCPCS SELF ADMINISTERED DRUGS (ALT 637 FOR MEDICARE OP): Performed by: ANESTHESIOLOGY

## 2024-01-20 PROCEDURE — 2500000005 HC RX 250 GENERAL PHARMACY W/O HCPCS: Performed by: NURSE PRACTITIONER

## 2024-01-20 PROCEDURE — 82805 BLOOD GASES W/O2 SATURATION: CPT

## 2024-01-20 PROCEDURE — 2500000004 HC RX 250 GENERAL PHARMACY W/ HCPCS (ALT 636 FOR OP/ED)

## 2024-01-20 PROCEDURE — 2500000004 HC RX 250 GENERAL PHARMACY W/ HCPCS (ALT 636 FOR OP/ED): Performed by: NURSE PRACTITIONER

## 2024-01-20 RX ORDER — SODIUM CHLORIDE, SODIUM LACTATE, POTASSIUM CHLORIDE, CALCIUM CHLORIDE 600; 310; 30; 20 MG/100ML; MG/100ML; MG/100ML; MG/100ML
5 INJECTION, SOLUTION INTRAVENOUS CONTINUOUS
Status: DISCONTINUED | OUTPATIENT
Start: 2024-01-20 | End: 2024-01-24 | Stop reason: HOSPADM

## 2024-01-20 RX ORDER — EPINEPHRINE 1 MG/ML
0.04 INJECTION INTRAMUSCULAR; INTRAVENOUS; SUBCUTANEOUS CONTINUOUS
Status: DISCONTINUED | OUTPATIENT
Start: 2024-01-20 | End: 2024-01-22

## 2024-01-20 RX ORDER — ACETAMINOPHEN 500 MG
10 TABLET ORAL NIGHTLY
Status: DISCONTINUED | OUTPATIENT
Start: 2024-01-20 | End: 2024-01-24 | Stop reason: HOSPADM

## 2024-01-20 RX ORDER — ALBUMIN HUMAN 50 G/1000ML
12.5 SOLUTION INTRAVENOUS ONCE
Status: COMPLETED | OUTPATIENT
Start: 2024-01-20 | End: 2024-01-20

## 2024-01-20 RX ORDER — INSULIN LISPRO 100 [IU]/ML
0-10 INJECTION, SOLUTION INTRAVENOUS; SUBCUTANEOUS
Status: DISCONTINUED | OUTPATIENT
Start: 2024-01-20 | End: 2024-01-21

## 2024-01-20 RX ADMIN — CEFAZOLIN SODIUM 2 G: 2 INJECTION, SOLUTION INTRAVENOUS at 04:34

## 2024-01-20 RX ADMIN — Medication 1 TABLET: at 00:19

## 2024-01-20 RX ADMIN — GABAPENTIN 100 MG: 100 CAPSULE ORAL at 16:03

## 2024-01-20 RX ADMIN — GABAPENTIN 100 MG: 100 CAPSULE ORAL at 09:37

## 2024-01-20 RX ADMIN — ATORVASTATIN CALCIUM 80 MG: 80 TABLET, FILM COATED ORAL at 09:38

## 2024-01-20 RX ADMIN — INSULIN LISPRO 2 UNITS: 100 INJECTION, SOLUTION INTRAVENOUS; SUBCUTANEOUS at 12:31

## 2024-01-20 RX ADMIN — PANTOPRAZOLE SODIUM 40 MG: 40 TABLET, DELAYED RELEASE ORAL at 09:38

## 2024-01-20 RX ADMIN — FENTANYL CITRATE 25 MCG: 50 INJECTION INTRAMUSCULAR; INTRAVENOUS at 04:39

## 2024-01-20 RX ADMIN — OXYCODONE HYDROCHLORIDE 5 MG: 5 TABLET ORAL at 04:39

## 2024-01-20 RX ADMIN — SENNOSIDES AND DOCUSATE SODIUM 2 TABLET: 8.6; 5 TABLET ORAL at 00:18

## 2024-01-20 RX ADMIN — ALBUMIN HUMAN 12.5 G: 0.05 INJECTION, SOLUTION INTRAVENOUS at 05:06

## 2024-01-20 RX ADMIN — ACETAMINOPHEN 975 MG: 325 TABLET ORAL at 00:18

## 2024-01-20 RX ADMIN — SENNOSIDES AND DOCUSATE SODIUM 2 TABLET: 8.6; 5 TABLET ORAL at 20:24

## 2024-01-20 RX ADMIN — ACETAMINOPHEN 975 MG: 325 TABLET ORAL at 05:33

## 2024-01-20 RX ADMIN — ESCITALOPRAM OXALATE 10 MG: 10 TABLET ORAL at 09:37

## 2024-01-20 RX ADMIN — ESCITALOPRAM OXALATE 10 MG: 10 TABLET ORAL at 20:24

## 2024-01-20 RX ADMIN — ACETAMINOPHEN 975 MG: 325 TABLET ORAL at 23:35

## 2024-01-20 RX ADMIN — OXYCODONE HYDROCHLORIDE 5 MG: 5 TABLET ORAL at 10:51

## 2024-01-20 RX ADMIN — CEFAZOLIN SODIUM 2 G: 2 INJECTION, SOLUTION INTRAVENOUS at 20:24

## 2024-01-20 RX ADMIN — MAGNESIUM OXIDE TAB 400 MG (241.3 MG ELEMENTAL MG) 400 MG: 400 (241.3 MG) TAB at 09:38

## 2024-01-20 RX ADMIN — LIDOCAINE 1 PATCH: 4 PATCH TOPICAL at 14:04

## 2024-01-20 RX ADMIN — FENTANYL CITRATE 25 MCG: 50 INJECTION INTRAMUSCULAR; INTRAVENOUS at 19:43

## 2024-01-20 RX ADMIN — CHOLECALCIFEROL TAB 25 MCG (1000 UNIT) 5000 UNITS: 25 TAB at 09:37

## 2024-01-20 RX ADMIN — CEFAZOLIN SODIUM 2 G: 2 INJECTION, SOLUTION INTRAVENOUS at 14:04

## 2024-01-20 RX ADMIN — ONDANSETRON 4 MG: 2 INJECTION INTRAMUSCULAR; INTRAVENOUS at 11:52

## 2024-01-20 RX ADMIN — OXYCODONE HYDROCHLORIDE 10 MG: 5 TABLET ORAL at 22:07

## 2024-01-20 RX ADMIN — GABAPENTIN 100 MG: 100 CAPSULE ORAL at 20:24

## 2024-01-20 RX ADMIN — ENOXAPARIN SODIUM 40 MG: 40 INJECTION SUBCUTANEOUS at 09:37

## 2024-01-20 RX ADMIN — SODIUM CHLORIDE, POTASSIUM CHLORIDE, SODIUM LACTATE AND CALCIUM CHLORIDE 5 ML/HR: 600; 310; 30; 20 INJECTION, SOLUTION INTRAVENOUS at 14:04

## 2024-01-20 RX ADMIN — Medication 10 MG: at 20:24

## 2024-01-20 RX ADMIN — ALBUMIN HUMAN 12.5 G: 0.05 INJECTION, SOLUTION INTRAVENOUS at 12:10

## 2024-01-20 RX ADMIN — ACETAMINOPHEN 975 MG: 325 TABLET ORAL at 18:10

## 2024-01-20 RX ADMIN — OXYCODONE HYDROCHLORIDE 5 MG: 5 TABLET ORAL at 16:03

## 2024-01-20 RX ADMIN — GABAPENTIN 100 MG: 100 CAPSULE ORAL at 00:18

## 2024-01-20 RX ADMIN — EPINEPHRINE 0.04 MCG/KG/MIN: 1 INJECTION INTRAMUSCULAR; INTRAVENOUS; SUBCUTANEOUS at 09:31

## 2024-01-20 RX ADMIN — INSULIN LISPRO 5 UNITS: 100 INJECTION, SOLUTION INTRAVENOUS; SUBCUTANEOUS at 00:19

## 2024-01-20 RX ADMIN — ACETAMINOPHEN 975 MG: 325 TABLET ORAL at 12:10

## 2024-01-20 RX ADMIN — Medication 1 TABLET: at 09:38

## 2024-01-20 ASSESSMENT — PAIN - FUNCTIONAL ASSESSMENT
PAIN_FUNCTIONAL_ASSESSMENT: 0-10

## 2024-01-20 ASSESSMENT — PAIN SCALES - GENERAL
PAINLEVEL_OUTOF10: 3
PAINLEVEL_OUTOF10: 0 - NO PAIN
PAINLEVEL_OUTOF10: 5 - MODERATE PAIN
PAINLEVEL_OUTOF10: 5 - MODERATE PAIN
PAINLEVEL_OUTOF10: 10 - WORST POSSIBLE PAIN
PAINLEVEL_OUTOF10: 5 - MODERATE PAIN
PAINLEVEL_OUTOF10: 4
PAINLEVEL_OUTOF10: 4
PAINLEVEL_OUTOF10: 5 - MODERATE PAIN
PAINLEVEL_OUTOF10: 8
PAINLEVEL_OUTOF10: 0 - NO PAIN
PAINLEVEL_OUTOF10: 2

## 2024-01-20 ASSESSMENT — COGNITIVE AND FUNCTIONAL STATUS - GENERAL
TURNING FROM BACK TO SIDE WHILE IN FLAT BAD: A LOT
WALKING IN HOSPITAL ROOM: TOTAL
STANDING UP FROM CHAIR USING ARMS: A LOT
MOBILITY SCORE: 11
CLIMB 3 TO 5 STEPS WITH RAILING: TOTAL
MOVING TO AND FROM BED TO CHAIR: A LOT
MOVING FROM LYING ON BACK TO SITTING ON SIDE OF FLAT BED WITH BEDRAILS: A LITTLE

## 2024-01-20 ASSESSMENT — PAIN DESCRIPTION - LOCATION
LOCATION: CHEST
LOCATION: SHOULDER
LOCATION: CHEST
LOCATION: CHEST

## 2024-01-20 ASSESSMENT — PAIN DESCRIPTION - ORIENTATION
ORIENTATION: MID
ORIENTATION: MID;LOWER
ORIENTATION: LEFT
ORIENTATION: MID

## 2024-01-20 ASSESSMENT — PAIN DESCRIPTION - DESCRIPTORS
DESCRIPTORS: ACHING

## 2024-01-20 ASSESSMENT — ACTIVITIES OF DAILY LIVING (ADL): BATHING_ASSISTANCE: MAXIMAL

## 2024-01-20 NOTE — PROGRESS NOTES
Occupational Therapy    Occupational Therapy    Evaluation    Patient Name: Jerel Drummond  MRN: [id number]  Today's Date: 1/20/2024  Time Calculation  Start Time: 1000  Stop Time: 1030  Time Calculation (min): 30 min    Assessment  IP OT Assessment  OT Assessment:  (GOOD OVERALL PROGRESS FOR DAY 1 , PATIENT ENGAGED BUT OVERALL ACTIVITY TOLERANCE LIMITED WITH PATIENT IN A- FIB EARLIER IN AM PER NURSING - GOOD POTENTIAL OVERAL.)  Prognosis: Good  Evaluation/Treatment Tolerance: Patient tolerated treatment well, Patient limited by fatigue  Medical Staff Made Aware: Yes  End of Session Communication: Bedside nurse  End of Session Patient Position: Bed, 2 rail up    Plan:  OT Frequency: 5 times per week  OT Discharge Recommendations: High intensity level of continued care    Subjective     Current Problem:  1. Aortic valve stenosis, critical  Anesthesia Intraoperative Transesophageal Echocardiogram Monitoring Only    Anesthesia Intraoperative Transesophageal Echocardiogram Monitoring Only    Anesthesia Intraoperative Transesophageal Echocardiogram Monitoring Only      2. Pre-op testing  XR chest 2 views      3. Nonrheumatic aortic valve stenosis  Anesthesia Intraoperative Transesophageal Echocardiogram Monitoring Only    Surgical Pathology Exam    Surgical Pathology Exam      4. Calcific aortic stenosis  Anesthesia Intraoperative Transesophageal Echocardiogram Monitoring Only          General:  General  Reason for Referral: OT EVAL AND TREAT  Referred By: RUSHING  Past Medical History Relevant to Rehab: CAD,HTN, HLD, COPD, CVA, DM  Family/Caregiver Present: No  General Comment:  (PATIENT ADMITTED WITH PLANNED PROCEDURE FOR AVR)    Precautions:  Medical Precautions: Cardiac precautions, Fall precautions, Oxygen therapy device and L/min    Vital Signs:       Pain:  Pain Assessment  Pain Assessment: 0-10  Pain Score: 5 - Moderate pain  Pain Type: Surgical pain  Pain Location: Breast    Objective     Cognition:  Overall  Cognitive Status: Within Functional Limits             Home Living:  Type of Home: House  Lives With: Spouse  Home Adaptive Equipment: Walker rolling or standard  Home Layout: Two level  Home Access: No concerns  Bathroom Shower/Tub: Tub/shower unit  Bathroom Toilet: Standard     Prior Function:  Level of [place]: Independent with ADLs and functional transfers, Independent with homemaking with ambulation  Hand Dominance: Right    IADL History:  Current License: Yes  Mode of Transportation: Car (SPOUSE ABLE TO DRIVE)    ADL:  Eating Assistance: Independent  Grooming Assistance: Independent  Bathing Assistance: Maximal  UE Dressing Assistance: Moderate  LE Dressing Assistance: Not performed  Toileting Assistance with Device: Not performed    Activity Tolerance:  Endurance: Tolerates less than 10 min exercise, no significant change in vital signs    Bed Mobility/Transfers:      Transfers  Transfer: Yes  Transfer 1  Transfer From 1: Sit to  Transfer to 1: Stand  Technique 1: Sit to stand    Ambulation/Gait Training:  Ambulation/Gait Training  Ambulation/Gait Training Performed: Yes  Ambulation/Gait Training 1  Surface 1: Level tile  Device 1: Rolling walker  Assistance 1: Moderate assistance  Quality of Gait 1:  (2-3 STEPS FORWARD AND BACKWARD ONLY AS PATIENT WITH MULIPLE LINES)    Sitting Balance:  Static Sitting Balance  Static Sitting-Balance Support: Feet supported    Standing Balance:       Vision: Vision - Basic Assessment  Current Vision: No visual deficits   and Vision - Complex Assessment  Ocular Range of Motion: Within Functional Limits  Head Position: WFL  Tracking: WFL  Visual Fields: WFL    Sensation:  Light Touch: No apparent deficits    Strength:  Strength Comments:  (WFL)    Perception:  Inattention/Neglect: Appears intact    Coordination:  Finger to Nose: Intact     Hand Function:  Hand Function  Gross Grasp: Functional  Coordination: Functional    Extremities: RUE   RUE : Within Functional Limits  and LUE   LUE: Within Functional Limits        EDUCATION:  Education  Individual(s) Educated: Patient  Education Provided: Symptom management, Fall precautons  Education Documentation  Precautions, taught by Eric Coreas OT at 1/20/2024 11:09 AM.  Learner: Patient  Readiness: Acceptance  Method: Explanation  Response: Verbalizes Understanding    Education Comments  No comments found.        Goals:   Encounter Problems       Encounter Problems (Active)       ADLs       Patient will perform UB and LB bathing  with modified independent level of assistance . (Progressing)       Start:  01/20/24    Expected End:  02/03/24            Patient with complete lower body dressing with modified independent level of assistance donning and doffing all LE clothes  with PRN adaptive equipment while edge of bed  (Progressing)       Start:  01/20/24    Expected End:  02/03/24            Patient will complete toileting including hygiene clothing management/hygiene with independent level of assistance and raised toilet seat and grab bars. (Progressing)       Start:  01/20/24    Expected End:  02/03/24               MOBILITY       Patient will perform Functional mobility mod  Household distances/Community Distances with independent level of assistance and least restrictive device in order to improve safety and functional mobility. (Progressing)       Start:  01/20/24    Expected End:  02/03/24               TRANSFERS       Patient will perform bed mobility independent level of assistance and bed rails in order to improve safety and independence with mobility (Progressing)       Start:  01/20/24    Expected End:  02/03/24

## 2024-01-20 NOTE — PROGRESS NOTES
Physical Therapy    Physical Therapy Evaluation    Patient Name: Jerel Drummond  MRN: [id number]  Today's Date: 1/20/2024   Time Calculation  Start Time: 1013  Stop Time: 1026  Time Calculation (min): 13 min    Assessment/Plan   PT Assessment  PT Assessment Results: Decreased strength, Decreased endurance, Impaired balance, Decreased mobility, Pain  Rehab Prognosis: Excellent  Evaluation/Treatment Tolerance: Patient limited by pain  Medical Staff Made Aware: Yes  End of Session Communication: Bedside nurse  Assessment Comment: 74 y/o male s/p AVR on 1/19/24 with tissue graft. reason for refferal to PT: post CT surgery who presents to PT with impaired mobility, balance and endurance who would benefit from continued in house PT and high intensity PT at d/c.  End of Session Patient Position: Up in chair  IP OR SWING BED PT PLAN  Inpatient or Swing Bed: Inpatient  PT Plan  PT Plan: Skilled PT  PT Frequency: 5 times per week  PT Discharge Recommendations: High intensity level of continued care  PT Recommended Transfer Status: Assist x2  PT - OK to Discharge: Yes (when medically cleared)      Subjective   General Visit Information:  General  Reason for Referral: 74 y/o male s/p AVR on 1/19/24 with tissue graft. reason for refferal to PT: post CT surgery  Referred By: Ellie WILLIS  Past Medical History Relevant to Rehab: CAD,HTN, HLD, COPD, CVA, DM  Family/Caregiver Present: No  Co-Treatment: OT  Prior to Session Communication: Bedside nurse  Patient Position Received: Up in chair  General Comment: Pt seated in chair, agreeable to PT eval, on 2L via NC, tele, sharp, temp pacer (BUR at 60), on epi (6.7) and norepi (10.1) {per RN no titration of vasoactive meds needed this AM), 2 chest tubes to suction, PA cateter, arterial line  Home Living:  Home Living  Type of Home: House  Lives With: Spouse  Home Adaptive Equipment: Walker rolling or standard ((has, but was not using))  Home Layout: Two level  Home Access: No  concerns  Home Living Comments:  (pt lives with wife, she will be providing care after surgery)  Prior Level of Function:  Prior Function Per Pt/Caregiver Report  Level of [place]: Independent with ADLs and functional transfers  Hand Dominance: Right  Prior Function Comments:  (+ falls assoicated with dizzyness, ambulating at least houshold distances, 1st floor set up)  Precautions:  Precautions  Medical Precautions: Cardiac precautions, Fall precautions, Oxygen therapy device and L/min, Chest tube (sternal precautions; 2L via NC)  Vital Signs:  Vital Signs  Heart Rate:  (pre 80, during, 76-84, post 81)  Heart Rate Source: Monitor  SpO2:  (pre 88%, during 85-96% (decreased immediatly post standing, improved with seated rest, post 94% on 2L via NC)  BP:  (pre: 124/53, during SBP's 109-143, post 144/61)    Objective   Pain:  Pain Assessment  Pain Assessment: 0-10  Pain Score: 5 - Moderate pain  Pain Type: Surgical pain  Pain Location: Chest  Cognition:  Cognition  Overall Cognitive Status: Within Functional Limits    General Assessments:                  Activity Tolerance  Endurance: Tolerates less than 10 min exercise with changes in vital signs    Sensation  Light Touch:  (+ tingleing in B LE and hands, present prior to surgery)    Strength  Strength Comments:  ((L) UE at least 3/5 tested with ROM, (B) LE at least 3+/5 tested functionally)  Strength  Strength Comments:  ((L) UE at least 3/5 tested with ROM, (B) LE at least 3+/5 tested functionally)      Static Standing Balance  Static Standing-Balance Support: Bilateral upper extremity supported  Static Standing-Level of Assistance: Minimum assistance  Static Standing-Comment/Number of Minutes:  (with 2WhW)  Dynamic Standing Balance  Dynamic Standing-Balance Support: Bilateral upper extremity supported  Dynamic Standing-Comments:  (min assist with (B) UE support)  Functional Assessments:    Transfers  Transfer: Yes  Transfer 1  Transfer From 1: Sit  to  Transfer to 1: Stand  Transfer Device 1: Walker  Transfer Level of Assistance 1: Moderate assistance  Transfers 2  Transfer From 2: Stand to  Transfer to 2: Sit  Transfer Device 2: Walker  Transfer Level of Assistance 2: Moderate assistance    Ambulation/Gait Training  Ambulation/Gait Training Performed: Yes  Ambulation/Gait Training 1  Surface 1: Level tile  Device 1: Rolling walker  Gait Support Devices:  (2WhW)  Assistance 1: Minimum assistance  Quality of Gait 1:  (marching in place x 3 reps)    Outcome Measures:  [facility] Basic Mobility  Turning from your back to your side while in a flat bed without using bedrails: A little  Moving from lying on your back to sitting on the side of a flat bed without using bedrails: A lot  Moving to and from bed to chair (including a wheelchair): A lot  Standing up from a chair using your arms (e.g. wheelchair or bedside chair): A lot  To walk in hospital room: Total  Climbing 3-5 steps with railing: Total  Basic Mobility - Total Score: 11    Encounter Problems       Encounter Problems (Active)       Mobility       STG - Patient will ambulate (Progressing)       Start:  01/20/24    Expected End:  02/10/24       >/= 300 feet with adaptive vital sign and oxygenation response and modified independence and LRAD         STG - Patient will ambulate up and down a curb/step (Progressing)       Start:  01/20/24    Expected End:  02/10/24       With SBA and LRAD              Pain       Pt will tolerate all PT with pain </= 3/10         Pain - Adult          Transfers       STG - Transfer from bed to chair (Progressing)       Start:  01/20/24    Expected End:  02/10/24       With modified independence and LRAD           STG - Patient will transfer sit to and from stand (Progressing)       Start:  01/20/24    Expected End:  02/10/24       With modified independence and LRAD           Goal 1 (Progressing)       Start:  01/20/24    Expected End:  02/10/24       Pt will perform bed mobility  with log roll and SBA                Education Documentation  Precautions, taught by Radha Dempsey PT at 1/20/2024  1:24 PM.  Learner: Patient  Readiness: Eager  Method: Explanation  Response: Verbalizes Understanding, Needs Reinforcement    Home Exercise Program, taught by Radha Dempsey PT at 1/20/2024  1:24 PM.  Learner: Patient  Readiness: Eager  Method: Explanation  Response: Verbalizes Understanding, Needs Reinforcement    Mobility Training, taught by Radha Dempsey PT at 1/20/2024  1:24 PM.  Learner: Patient  Readiness: Eager  Method: Explanation  Response: Verbalizes Understanding, Needs Reinforcement    Precautions, taught by Radha Dempsey PT at 1/20/2024  1:24 PM.  Learner: Patient  Readiness: Eager  Method: Explanation  Response: Verbalizes Understanding, Needs Reinforcement    Education Comments  No comments found.

## 2024-01-20 NOTE — CARE PLAN
The patient's goals for the shift include      The clinical goals for the shift include pt will remain hemodynamically stable with interventions [id number] the shift  Patient remained stable with interventions    Patient was safe during shift. Uses call light appropriately. Hemodynamically stable with interventions. Pain at acceptable level with interventions. Patient slept briefly between interactions throughout shift.   Problem: Pain - Adult  Goal: Verbalizes/displays adequate comfort level or baseline comfort level  Outcome: Progressing     Problem: Safety - Adult  Goal: Free from fall injury  Outcome: Progressing     Problem: Discharge Planning  Goal: Discharge to home or other facility with appropriate resources  Outcome: Progressing     Problem: Chronic Conditions and Co-morbidities  Goal: Patient's chronic conditions and co-morbidity symptoms are monitored and maintained or improved  Outcome: Progressing     Problem: Skin  Goal: Decreased wound size/increased tissue granulation at next dressing change  Outcome: Progressing  Goal: Participates in plan/prevention/treatment measures  Outcome: Progressing  Goal: Prevent/manage excess moisture  Outcome: Progressing  Goal: Prevent/minimize sheer/friction injuries  Outcome: Progressing  Goal: Promote/optimize nutrition  Outcome: Progressing  Goal: Promote skin healing  Outcome: Progressing     Problem: Knowledge Deficit  Goal: Patient/family/caregiver demonstrates understanding of disease process, treatment plan, medications, and discharge instructions  Outcome: Met     Problem: Pain  Goal: Takes deep breaths with improved pain control throughout the shift  Outcome: Progressing  Goal: Turns in bed with improved pain control throughout the shift  Outcome: Progressing  Goal: Walks with improved pain control throughout the shift  Outcome: Progressing  Goal: Performs ADL's with improved pain control throughout shift  Outcome: Progressing  Goal: Participates in PT with  improved pain control throughout the shift  Outcome: Progressing  Goal: Free from opioid side effects throughout the shift  Outcome: Progressing  Goal: Free from acute confusion related to pain meds throughout the shift  Outcome: Progressing     Problem: Fall/Injury  Goal: Not fall by end of shift  Outcome: Progressing  Goal: Be free from injury by end of the shift  Outcome: Progressing  Goal: Verbalize understanding of personal risk factors for fall in the hospital  Outcome: Progressing  Goal: Verbalize understanding of risk factor reduction measures to prevent injury from fall in the home  Outcome: Progressing  Goal: Use assistive devices by end of the shift  Outcome: Progressing  Goal: Pace activities to prevent fatigue by end of the shift  Outcome: Progressing     Problem: DVT/VTE Prevention/Activity  Goal: No decrease in circulation/sensation  Outcome: Progressing  Goal: Prevent skin breakdown  Outcome: Progressing  Goal: Return to preop oxygenation status  Outcome: Progressing  Goal: Tolerates optimal activity  Outcome: Progressing  Goal: Increase self care and/or family involvement in 24 hrs.  Outcome: Progressing     Problem: Wound care/infection prevention  Goal: No signs of infection in 24 hrs.  Outcome: Progressing  Goal: No unexpected bleeding from incision this shift  Outcome: Progressing     Problem: Diet/fluid balance  Goal: Adequate urinary output  Outcome: Progressing  Goal: Free from nausea/vomiting  Outcome: Progressing  Goal: Return in bowel function  Outcome: Progressing  Goal: Tolerates prescribed diet  Outcome: Progressing     Problem: Other goals  Goal: No change in neurological status  Outcome: Progressing  Goal: Stabilize vital signs (return to 10% of baseline)  Outcome: Progressing     Problem: Respiratory  Goal: Clear secretions with interventions this shift  Outcome: Progressing  Goal: Minimize anxiety/maximize coping throughout shift  Outcome: Progressing  Goal: Minimal/no exertional  discomfort or dyspnea this shift  Outcome: Progressing  Goal: No signs of respiratory distress (eg. Use of accessory muscles. Peds grunting)  Outcome: Progressing  Goal: Patent airway maintained this shift  Outcome: Progressing  Goal: Tolerate pulmonary toileting this shift  Outcome: Progressing  Goal: Verbalize decreased shortness of breath this shift  Outcome: Progressing  Goal: Wean oxygen to maintain O2 saturation per order/standard this shift  Outcome: Progressing  Goal: Increase self care and/or family involvement in next 24 hours  Outcome: Progressing

## 2024-01-20 NOTE — PROGRESS NOTES
"CTICU Progress Note  Jerel Drummond/[id number]    Admit Date: 1/19/2024  Hospital Length of Stay: 1   ICU Length of Stay: 1d 1h   CT SURGEON: Dr. Eddy    SUBJECTIVE:   Extubated POD 0.  Overnight had CI < 2.0 and SvO2 ~ 50.    On levophed 0.05 throughout the night.     MEDICATIONS  Infusions:  EPINEPHrine, Last Rate: 0.04 mcg/kg/min (01/20/24 0931)  lactated Ringer's, Last Rate: 5 mL/hr (01/20/24 1404)      Scheduled:  acetaminophen, 975 mg, q6h  atorvastatin, 80 mg, Daily  calcium carbonate-vitamin D3, 1 tablet, Daily  ceFAZolin, 2 g, q8h  cholecalciferol, 5,000 Units, Daily  enoxaparin, 40 mg, Daily  escitalopram, 10 mg, BID  gabapentin, 100 mg, TID  insulin lispro, 0-10 Units, TID with meals  lidocaine, 1 patch, q24h  magnesium oxide, 400 mg, Daily  pantoprazole, 40 mg, Daily before breakfast  psyllium, 1 packet, Daily  sennosides-docusate sodium, 2 tablet, BID      PRN:  albumin human, 12.5 g, PRN  bisacodyl, 10 mg, Daily PRN  dextrose, 25 g, q15 min PRN   Or  glucagon, 1 mg, q15 min PRN  fentaNYL, 25 mcg, q1h PRN  magnesium sulfate, 2 g, q6h PRN  magnesium sulfate, 4 g, q6h PRN  naloxone, 0.2 mg, q5 min PRN  ondansetron, 4 mg, q4h PRN  oxyCODONE, 10 mg, q6h PRN  oxyCODONE, 5 mg, q6h PRN  oxygen, , Continuous PRN - O2/gases  potassium chloride, 20 mEq, q1h PRN  potassium chloride, 40 mEq, q2h PRN        PHYSICAL EXAM:   Visit Vitals  /67 (BP Location: Left arm, Patient Position: Lying)   Pulse 83   Temp 36.6 °C (97.9 °F) (Tympanic)   Resp 23   Ht 1.829 m (6' 0.01\")   Wt 112 kg (247 lb 9.2 oz)   SpO2 94%   BMI 33.57 kg/m²   Smoking Status Former   BSA 2.39 m²     Wt Readings from Last 5 Encounters:   01/20/24 112 kg (247 lb 9.2 oz)   01/16/24 108 kg (237 lb 7 oz)   11/08/23 105 kg (231 lb)   01/19/23 107 kg (235 lb 0.2 oz)     INTAKE/OUTPUT:  I/O last 3 completed shifts:  In: 41283.6 (95.1 mL/kg) [P.O.:2000; I.V.:2863.3 (25.5 mL/kg); Blood:1328; IV Piggyback:4483.3]  Out: 4085 (36.4 mL/kg) [Urine:2855 (0.7 " mL/kg/hr); Blood:500; Chest Tube:730]  Weight: 112.3 kg        LDA:  CVC 01/19/24 Single lumen Right Internal jugular (Active)   Placement Date/Time: 01/19/24 (c) 0820   Hand Hygiene Performed Prior to CVC Insertion: Yes  Site Prep: Chlorhexidine   Site Prep Agent has Completely Dried Before Insertion: Yes  All 5 Sterile Barriers Used (Gloves, Gown, Cap, Mask, Large Sterile Radha...   Number of days: 1       Introducer 01/19/24 Internal jugular Right (Active)   Placement Date/Time: 01/19/24 (c) 0820   Hand Hygiene Completed: Yes  Location: Internal jugular  Orientation: Right  Placement Verified: Pressure tracing changes;Transesophageal echocardiogram   Number of days: 1       Arterial Line 01/19/24 Right Brachial (Active)   Placement Date/Time: 01/19/24 (c) 0753   Size: 20 G  Orientation: Right  Location: Brachial  Securement Method: Transparent dressing  Patient Tolerance: Tolerated well   Number of days: 1       Urethral Catheter Temperature probe 14 Fr. (Active)   Placement Date/Time: 01/19/24 0800   Hand Hygiene Completed: Yes  Catheter Type: Temperature probe  Tube Size (Fr.): 14 Fr.  Catheter Balloon Size: 10 mL  Urine Returned: Yes   Number of days: 1       Chest Tube 1 Mediastinal 28 Fr (Active)   Placement Date/Time: 01/19/24 1117   Hand Hygiene Completed: Yes  Tube Number: 1  Chest Tube Location: Mediastinal  Chest Tube Drain Tube Size (Fr): 28 Fr  Chest Tube Drainage System: Dry seal chest drain   Number of days: 1       Chest Tube 2 Left Pleural 28 Fr (Active)   Placement Date/Time: 01/19/24 1118   Hand Hygiene Completed: Yes  Tube Number: 2  Chest Tube Orientation: Left  Chest Tube Location: Pleural  Chest Tube Drain Tube Size (Fr): 28 Fr  Chest Tube Drainage System: Dry seal chest drain   Number of days: 1       Physical Exam:   PHYSICAL EXAM:  - GENERAL: No acute distress. Well-nourished.  - NEUROLOGIC: No focal neurological deficits. Cam negative.   - LUNGS: Diminished bases. No accessory muscle use.  4L NC.   - CARDIOVASCULAR: Regular rate and rhythm.  Epicardial wires VVI@60.   - ABDOMEN: Soft, non-tender and non-distended.   - : Clear, yellow urine in sharp.   - EXTREMITIES: No edema. Non-tender.?  - SKIN: No rashes or lesions. Warm.  - PSYCHIATRIC: Calm and cooperative.       Images: Reviewed    Invasive Hemodynamics:    Most Recent Range Past 24hrs   BP (Art) 123/52 Arterial Line BP 1  Min: 68/49  Max: 134/78   MAP(Art) 74 mmHg Arterial Line MAP 1 (mmHg)   Min: 58 mmHg  Max: 100 mmHg   RA/CVP   No data recorded   PA  (pre: 48/19 (31), post 53/21) PAP  Min: 35/16  Max: 53/31   PA(mean) 32 mmHg PAP (Mean)  Min: 23 mmHg  Max: 40 mmHg   CO 5.9 L/min CO (L/min)  Min: 2.7 L/min  Max: 5.9 L/min   CI 2.6 L/min/m2 CI (L/min/m2)  Min: 1.2 L/min/m2  Max: 2.6 L/min/m2   Mixed Venous   No data recorded   SVR  979 (dyne*sec)/cm5 SVR (dyne*sec)/cm5  Min: 979 (dyne*sec)/cm5  Max: 1950 (dyne*sec)/cm5       Daily Risk Screen:  Parenteral medication  critically ill patient who need accurate urinary output measurements        Assessment/Plan   Jerel Drummond is a 73 y.o. male with a PMH significant for Severe Calcific Bicuspid Aortic Valve Stenosis, CAD (previous STEMI), HTN, HLD and COPD who presents to [facility] [place] on 1/19/24 for a staged aortic valve replacement.   Now s/p AVR, left pericardial window and Ligation, ligament of Hang and part of the LLEAP trial with Dr. Eddy on 1/19.    Plan:  NEURO:  PMH of insomnia. Acute post operative pain, relatively well controlled. OOB to chair.   -->  - Serial neuro and pain assessments    - Scheduled Tylenol   - PRN oxycodone  - PRN fentanyl for pain   - Continue scheduled magnesium and gabapentin  - PT Consult, OOB to chair as tolerated  - CAM ICU score qshift  - Sleep/wake cycle hygiene  - Continue home lexapro   - Continue home melatonin 10 mg HS  - Hold home Ambien 5 mg     CV:  Patient has a history of CAD s/p PCI to RCA (1/2023), AVR, HTN,  HLD, Now s/p Bio prosthetic AVR, left pericardial window and Ligation, ligament of Hang with Dr. Eddy on 1/19.  On levophed 0.05 overnight with ScVo2 ~50s and CI 1.7.  A/V epicardial wires set VVI@60, SR with 1 AV block 80s. Pa Pressures improved throughout afternoon to PA sys ~ 30s. -->  - Continuous EKG and ABP monitoring  - Titrate levophed to maintain goal MAP 65-75; now off  - Start Epi 0.04 for inotropic support, wean for CI > 2.2  - Mixed venous and C.I. Q4 w/ goal SvO2 > 65% and C.I. > 2.2   - Volume resuscitate as clinically indicated  - Maintain epicardial wires set  - Continue aspirin   - Follow up with Dr. Eddy regarding resuming home brilinta   - Start atorvastatin   - Hold home metoprolol     PULM:  No history of pulmonary disease.  Extubated POD 0. Left pleural and mediastinal chest tubes, no air leaks. 4L NC, IS ~ 500 mL. -->  - Daily CXR  - Wean FiO2 maintaining SpO2 >92%.   - ABGs as needed  - IS q1h and OOB to chair  - Chest tubes to wall suction     GI:  PMH of GERD. Passed swallow evaluation. No Post op BM. -->  - Continue PPI  - Regular Diet  - Sennakot BID and metamucil      :  No history of renal disease, baseline CRE 0.9. Creatinine stable post-op. CSA-GEOVANNA Risk Score… Sharp in place and making adequate UOP. -->  - Continue sharp catheter for strict I/Os.    - Goal UOP 0.5ml/kg/hr  - RFP as clinically indicated  - Replete electrolytes per CTICU protocol     ENDO:  No endo history.  A1c: 5.7. -->  - Maintain BG <180, insulin per CTICU protocol     HEME:  Acute blood loss anemia & thrombocytopenia. -->    - Monitor drain output volume and characteristics  - CBC, coags, and fibrinogen post op and as clinically indicated  - Start aspirin  - Follow up with Dr. Eddy regarding resuming home brilinta   - start Lovenox  - SCDs for DVT prophylaxis.  - last type and screen:      ID:  Afebrile, no current indications of infection. MRSA: negative-->  - Trend temp q4h  - Periop Ancef 2G x  48hrs     Skin:    - arrived to ICU from OR with preventative Mepilex dressings in place on sacrum and heels  - change preventative Mepilex weekly or more frequently as indicated (when moist/soiled)   - every shift skin assessment per nursing and weekly ICU skin rounds  - moisture barrier to be applied with rick care  - active skin problems addressed with nursing on daily rounds Pressure ulcer/skin tear/DTI      Proph:  SCDs  PPI  Lovenox     G:  Line  Right IJ MAC w PAC placed 1/19  RIght Brach [place] placed -1/19  Thorne 1/19    F: Family: Will update when available      A,B,C,D,E,F,G: reviewed    Restraints: Not indicated    Code status: Full Code      I personally spent 50 minutes of critical care time directly and personally managing the patient exclusive of separately billable procedures.     A,B,C,D,E,F,G: reviewed     Dispo: CTICU care for now.      Marisela Head APRN-CNP

## 2024-01-20 NOTE — PROGRESS NOTES
Subjective   Received Plasma-Lyte and albumin overnight.  Sitting up in recliner.  Reports 5/10 incisional discomfort.  Denies any lightheadedness or shortness of breath.    Objective   Physical Exam:   Constitutional: awake, alert  ENMT: mucous membranes moist, EOMI, conjunctivae clear  Head/Neck: normocephalic, atraumatic; supple, trachea midline  Respiratory/Thorax: diminished breath sounds but otherwise clear  Cardiovascular: RRR, no murmur appreciated  Gastrointestinal: soft, nondistended, non-tender, bowel sounds appreciated  Extremities: palpable peripheral pulses, no edema  Neurological: AO x3, no focal deficits  Psychological: pleasant, cooperative  Skin: warm and dry    Scheduled Medications:   acetaminophen, 975 mg, oral, q6h  atorvastatin, 80 mg, oral, Daily  calcium carbonate-vitamin D3, 1 tablet, oral, Daily  ceFAZolin, 2 g, intravenous, q8h  cholecalciferol, 5,000 Units, oral, Daily  enoxaparin, 40 mg, subcutaneous, Daily  escitalopram, 10 mg, oral, BID  gabapentin, 100 mg, oral, TID  insulin lispro, 0-15 Units, subcutaneous, q4h  lidocaine, 1 patch, transdermal, q24h  magnesium oxide, 400 mg, oral, Daily  pantoprazole, 40 mg, oral, Daily before breakfast  psyllium, 1 packet, oral, Daily  sennosides-docusate sodium, 2 tablet, oral, BID    Continuous Medications:   dexmedeTOMIDine, 0-1.5 mcg/kg/hr, Last Rate: Stopped (01/20/24 0430)  EPINEPHrine, 0.04 mcg/kg/min (Dosing Weight), Last Rate: 0.04 mcg/kg/min (01/20/24 0931)  norepinephrine, 0-3.3 mcg/kg/min, Last Rate: 0.05 mcg/kg/min (01/20/24 0640)    PRN Medications:   PRN medications: albumin human, bisacodyl, dextrose **OR** glucagon, fentaNYL, magnesium sulfate, magnesium sulfate, naloxone, ondansetron, oxyCODONE, oxyCODONE, oxygen, potassium chloride, potassium chloride    Assessment/Plan   This is a 73-year-old male, with history of heavily calcified bicuspid aortic valve, HTN, HLD, CAD with prior MI, hx CVA, and COPD, who initially presented to  [facility] on 1/19/24 for staged AVR.  Patient presented to the CICU postoperatively intubated and sedated on pressors.    Neurological:  #Postoperative pain  #Hx CVA  - Analgesia: per CTS  - Avoid excess caths & drains  - Monitor for ICU delirium    Cardiovascular:  #Calcified bicuspid AV s/p AVR on 1/19/24  #Postoperative vasoplegia  #CAD with prior MI  #Hx HTN  #Hx HLD  - Pressors: Levophed, Epi. Maintain MAP>65.  - Continue Atorvastatin. Hold home Metoprolol succinate.    Respiratory:  #Postoperative respiratory insufficiency  #Hx COPD  - Currently on 3L NC. Wean O2 as tolerated with goal SpO2 89-92% given hx COPD.   - CTs management per CT surgery  - Encourage IS use    Gastrointestinal:  - Diet: ADAT  - PPX: Protonix    Endocrine:  No hx DM.  - SSI (0-10) TID AC.  - Goal -180s.    Renal:  No acute issues.  - Monitor & replete electrolytes PRN    Hematological:  #Postoperative blood loss anemia  - Monitor CT output. Trend CBC.    Infection Disease:  - Perioperative Ancef per CTS    Skin/MSK:  No acute issues.    ICU Checklist:  Antimicrobials: Ancef   Oxygen: 3L NC   Drips: Levophed, Epinephrine   Feeding/Fluids: ADAT   Analgesia: Per CTS  Sedation: -   Thromboprophylaxis: SCDs and Lovenox   Ulcer prophylaxis: Protonix   Glycemic control: BGM with SSI  Bowel care: PRN  Indwelling catheters: Mati, CTs x2 (1/19)  Lines: PIV, R brachial A (1/19), R IJ (1/19)  Restraints: -  Dispo: MICU   Code Status: FULL    This is a preliminary note written by the resident. Please wait for attending addendum for finalization of note and recommendations.    Gardenia Pan,   Internal Medicine PGY3

## 2024-01-21 ENCOUNTER — APPOINTMENT (OUTPATIENT)
Dept: RADIOLOGY | Facility: HOSPITAL | Age: 74
DRG: 219 | End: 2024-01-21
Payer: MEDICARE

## 2024-01-21 LAB
ALBUMIN SERPL BCP-MCNC: 3.2 G/DL (ref 3.4–5)
ANION GAP SERPL CALC-SCNC: 9 MMOL/L (ref 10–20)
APPARATUS: ABNORMAL
BASE EXCESS BLDMV CALC-SCNC: 1 MMOL/L (ref -2–3)
BASE EXCESS BLDMV CALC-SCNC: 2.2 MMOL/L (ref -2–3)
BASE EXCESS BLDMV CALC-SCNC: 2.5 MMOL/L (ref -2–3)
BASE EXCESS BLDMV CALC-SCNC: 2.5 MMOL/L (ref -2–3)
BODY TEMPERATURE: 37 DEGREES CELSIUS
BUN SERPL-MCNC: 17 MG/DL (ref 6–23)
CA-I BLD-SCNC: 1.12 MMOL/L (ref 1.1–1.33)
CALCIUM SERPL-MCNC: 7.9 MG/DL (ref 8.6–10.3)
CHLORIDE SERPL-SCNC: 101 MMOL/L (ref 98–107)
CO2 SERPL-SCNC: 27 MMOL/L (ref 21–32)
CREAT SERPL-MCNC: 0.98 MG/DL (ref 0.5–1.3)
EGFRCR SERPLBLD CKD-EPI 2021: 80 ML/MIN/1.73M*2
ERYTHROCYTE [DISTWIDTH] IN BLOOD BY AUTOMATED COUNT: 14.6 % (ref 11.5–14.5)
GLUCOSE BLD MANUAL STRIP-MCNC: 105 MG/DL (ref 74–99)
GLUCOSE BLD MANUAL STRIP-MCNC: 113 MG/DL (ref 74–99)
GLUCOSE BLD MANUAL STRIP-MCNC: 115 MG/DL (ref 74–99)
GLUCOSE BLD MANUAL STRIP-MCNC: 141 MG/DL (ref 74–99)
GLUCOSE BLD MANUAL STRIP-MCNC: 142 MG/DL (ref 74–99)
GLUCOSE SERPL-MCNC: 141 MG/DL (ref 74–99)
HCO3 BLDMV-SCNC: 26.6 MMOL/L (ref 22–26)
HCO3 BLDMV-SCNC: 27.8 MMOL/L (ref 22–26)
HCO3 BLDMV-SCNC: 27.9 MMOL/L (ref 22–26)
HCO3 BLDMV-SCNC: 28.4 MMOL/L (ref 22–26)
HCT VFR BLD AUTO: 27.2 % (ref 41–52)
HGB BLD-MCNC: 8.9 G/DL (ref 13.5–17.5)
INHALED O2 CONCENTRATION: 28 %
MAGNESIUM SERPL-MCNC: 1.94 MG/DL (ref 1.6–2.4)
MCH RBC QN AUTO: 30.2 PG (ref 26–34)
MCHC RBC AUTO-ENTMCNC: 32.7 G/DL (ref 32–36)
MCV RBC AUTO: 92 FL (ref 80–100)
NRBC BLD-RTO: 0 /100 WBCS (ref 0–0)
OXYHGB MFR BLDMV: 58.6 % (ref 45–75)
OXYHGB MFR BLDMV: 59.7 % (ref 45–75)
OXYHGB MFR BLDMV: 60 % (ref 45–75)
OXYHGB MFR BLDMV: 74.7 % (ref 45–75)
PCO2 BLDMV: 45 MM HG (ref 41–51)
PCO2 BLDMV: 45 MM HG (ref 41–51)
PCO2 BLDMV: 46 MM HG (ref 41–51)
PCO2 BLDMV: 48 MM HG (ref 41–51)
PH BLDMV: 7.38 PH (ref 7.33–7.43)
PH BLDMV: 7.38 PH (ref 7.33–7.43)
PH BLDMV: 7.39 PH (ref 7.33–7.43)
PH BLDMV: 7.4 PH (ref 7.33–7.43)
PHOSPHATE SERPL-MCNC: 2.3 MG/DL (ref 2.5–4.9)
PLATELET # BLD AUTO: 70 X10*3/UL (ref 150–450)
PO2 BLDMV: 34 MM HG (ref 35–45)
PO2 BLDMV: 34 MM HG (ref 35–45)
PO2 BLDMV: 35 MM HG (ref 35–45)
PO2 BLDMV: 45 MM HG (ref 35–45)
POTASSIUM SERPL-SCNC: 3.8 MMOL/L (ref 3.5–5.3)
RBC # BLD AUTO: 2.95 X10*6/UL (ref 4.5–5.9)
SAO2 % BLDMV: 60 % (ref 45–75)
SAO2 % BLDMV: 61 % (ref 45–75)
SAO2 % BLDMV: 62 % (ref 45–75)
SAO2 % BLDMV: 77 % (ref 45–75)
SODIUM SERPL-SCNC: 133 MMOL/L (ref 136–145)
STAPHYLOCOCCUS SPEC CULT: NORMAL
WBC # BLD AUTO: 13.4 X10*3/UL (ref 4.4–11.3)

## 2024-01-21 PROCEDURE — 2500000001 HC RX 250 WO HCPCS SELF ADMINISTERED DRUGS (ALT 637 FOR MEDICARE OP)

## 2024-01-21 PROCEDURE — 37799 UNLISTED PX VASCULAR SURGERY: CPT

## 2024-01-21 PROCEDURE — 71045 X-RAY EXAM CHEST 1 VIEW: CPT

## 2024-01-21 PROCEDURE — 71045 X-RAY EXAM CHEST 1 VIEW: CPT | Performed by: RADIOLOGY

## 2024-01-21 PROCEDURE — 85027 COMPLETE CBC AUTOMATED: CPT | Performed by: NURSE PRACTITIONER

## 2024-01-21 PROCEDURE — 80069 RENAL FUNCTION PANEL: CPT | Performed by: NURSE PRACTITIONER

## 2024-01-21 PROCEDURE — 82805 BLOOD GASES W/O2 SATURATION: CPT

## 2024-01-21 PROCEDURE — 2060000001 HC INTERMEDIATE ICU ROOM DAILY

## 2024-01-21 PROCEDURE — 2500000001 HC RX 250 WO HCPCS SELF ADMINISTERED DRUGS (ALT 637 FOR MEDICARE OP): Performed by: NURSE PRACTITIONER

## 2024-01-21 PROCEDURE — 83735 ASSAY OF MAGNESIUM: CPT | Performed by: NURSE PRACTITIONER

## 2024-01-21 PROCEDURE — 2500000004 HC RX 250 GENERAL PHARMACY W/ HCPCS (ALT 636 FOR OP/ED): Performed by: NURSE PRACTITIONER

## 2024-01-21 PROCEDURE — 82330 ASSAY OF CALCIUM: CPT | Performed by: NURSE PRACTITIONER

## 2024-01-21 PROCEDURE — 2500000005 HC RX 250 GENERAL PHARMACY W/O HCPCS: Performed by: NURSE PRACTITIONER

## 2024-01-21 PROCEDURE — 2500000001 HC RX 250 WO HCPCS SELF ADMINISTERED DRUGS (ALT 637 FOR MEDICARE OP): Performed by: ANESTHESIOLOGY

## 2024-01-21 PROCEDURE — 2500000004 HC RX 250 GENERAL PHARMACY W/ HCPCS (ALT 636 FOR OP/ED): Performed by: STUDENT IN AN ORGANIZED HEALTH CARE EDUCATION/TRAINING PROGRAM

## 2024-01-21 PROCEDURE — 2500000001 HC RX 250 WO HCPCS SELF ADMINISTERED DRUGS (ALT 637 FOR MEDICARE OP): Performed by: STUDENT IN AN ORGANIZED HEALTH CARE EDUCATION/TRAINING PROGRAM

## 2024-01-21 PROCEDURE — 82947 ASSAY GLUCOSE BLOOD QUANT: CPT

## 2024-01-21 PROCEDURE — 99233 SBSQ HOSP IP/OBS HIGH 50: CPT | Performed by: NURSE PRACTITIONER

## 2024-01-21 PROCEDURE — 99291 CRITICAL CARE FIRST HOUR: CPT | Performed by: STUDENT IN AN ORGANIZED HEALTH CARE EDUCATION/TRAINING PROGRAM

## 2024-01-21 PROCEDURE — 2500000005 HC RX 250 GENERAL PHARMACY W/O HCPCS: Performed by: THORACIC SURGERY (CARDIOTHORACIC VASCULAR SURGERY)

## 2024-01-21 RX ORDER — KETOROLAC TROMETHAMINE 30 MG/ML
30 INJECTION, SOLUTION INTRAMUSCULAR; INTRAVENOUS ONCE
Status: CANCELLED | OUTPATIENT
Start: 2024-01-21 | End: 2024-01-26

## 2024-01-21 RX ORDER — NAPROXEN SODIUM 220 MG/1
81 TABLET, FILM COATED ORAL DAILY
Status: DISCONTINUED | OUTPATIENT
Start: 2024-01-21 | End: 2024-01-21

## 2024-01-21 RX ORDER — FUROSEMIDE 10 MG/ML
20 INJECTION INTRAMUSCULAR; INTRAVENOUS 2 TIMES DAILY
Status: DISCONTINUED | OUTPATIENT
Start: 2024-01-21 | End: 2024-01-23

## 2024-01-21 RX ORDER — KETOROLAC TROMETHAMINE 30 MG/ML
30 INJECTION, SOLUTION INTRAMUSCULAR; INTRAVENOUS ONCE
Status: COMPLETED | OUTPATIENT
Start: 2024-01-21 | End: 2024-01-21

## 2024-01-21 RX ORDER — METOPROLOL TARTRATE 25 MG/1
12.5 TABLET, FILM COATED ORAL 2 TIMES DAILY
Status: DISCONTINUED | OUTPATIENT
Start: 2024-01-21 | End: 2024-01-22

## 2024-01-21 RX ORDER — METOPROLOL TARTRATE 1 MG/ML
5 INJECTION, SOLUTION INTRAVENOUS
Status: DISCONTINUED | OUTPATIENT
Start: 2024-01-21 | End: 2024-01-23

## 2024-01-21 RX ADMIN — OXYCODONE HYDROCHLORIDE 10 MG: 5 TABLET ORAL at 04:56

## 2024-01-21 RX ADMIN — GABAPENTIN 100 MG: 100 CAPSULE ORAL at 08:32

## 2024-01-21 RX ADMIN — Medication 10 MG: at 20:58

## 2024-01-21 RX ADMIN — Medication 1 TABLET: at 08:31

## 2024-01-21 RX ADMIN — METOPROLOL TARTRATE 5 MG: 5 INJECTION INTRAVENOUS at 21:38

## 2024-01-21 RX ADMIN — GABAPENTIN 100 MG: 100 CAPSULE ORAL at 16:55

## 2024-01-21 RX ADMIN — KETOROLAC TROMETHAMINE 30 MG: 30 INJECTION, SOLUTION INTRAMUSCULAR; INTRAVENOUS at 11:59

## 2024-01-21 RX ADMIN — METOPROLOL TARTRATE 12.5 MG: 25 TABLET, FILM COATED ORAL at 21:01

## 2024-01-21 RX ADMIN — ESCITALOPRAM OXALATE 10 MG: 10 TABLET ORAL at 20:58

## 2024-01-21 RX ADMIN — POTASSIUM PHOSPHATE, MONOBASIC 500 MG: 500 TABLET, SOLUBLE ORAL at 09:10

## 2024-01-21 RX ADMIN — PANTOPRAZOLE SODIUM 40 MG: 40 TABLET, DELAYED RELEASE ORAL at 06:02

## 2024-01-21 RX ADMIN — POTASSIUM CHLORIDE 20 MEQ: 14.9 INJECTION, SOLUTION INTRAVENOUS at 06:02

## 2024-01-21 RX ADMIN — ACETAMINOPHEN 975 MG: 325 TABLET ORAL at 06:02

## 2024-01-21 RX ADMIN — Medication 2 L/MIN: at 21:15

## 2024-01-21 RX ADMIN — ESCITALOPRAM OXALATE 10 MG: 10 TABLET ORAL at 08:32

## 2024-01-21 RX ADMIN — ACETAMINOPHEN 975 MG: 325 TABLET ORAL at 11:59

## 2024-01-21 RX ADMIN — ATORVASTATIN CALCIUM 80 MG: 80 TABLET, FILM COATED ORAL at 08:31

## 2024-01-21 RX ADMIN — ACETAMINOPHEN 975 MG: 325 TABLET ORAL at 23:57

## 2024-01-21 RX ADMIN — CHOLECALCIFEROL TAB 25 MCG (1000 UNIT) 5000 UNITS: 25 TAB at 08:32

## 2024-01-21 RX ADMIN — GABAPENTIN 100 MG: 100 CAPSULE ORAL at 20:58

## 2024-01-21 RX ADMIN — ACETAMINOPHEN 975 MG: 325 TABLET ORAL at 18:11

## 2024-01-21 RX ADMIN — CEFAZOLIN SODIUM 2 G: 2 INJECTION, SOLUTION INTRAVENOUS at 13:41

## 2024-01-21 RX ADMIN — SENNOSIDES AND DOCUSATE SODIUM 2 TABLET: 8.6; 5 TABLET ORAL at 08:32

## 2024-01-21 RX ADMIN — FUROSEMIDE 20 MG: 10 INJECTION, SOLUTION INTRAMUSCULAR; INTRAVENOUS at 16:55

## 2024-01-21 RX ADMIN — MAGNESIUM OXIDE TAB 400 MG (241.3 MG ELEMENTAL MG) 400 MG: 400 (241.3 MG) TAB at 08:32

## 2024-01-21 RX ADMIN — LIDOCAINE 1 PATCH: 4 PATCH TOPICAL at 13:42

## 2024-01-21 RX ADMIN — PSYLLIUM HUSK 1 PACKET: 3.4 POWDER ORAL at 08:32

## 2024-01-21 RX ADMIN — CEFAZOLIN SODIUM 2 G: 2 INJECTION, SOLUTION INTRAVENOUS at 04:54

## 2024-01-21 RX ADMIN — SENNOSIDES AND DOCUSATE SODIUM 2 TABLET: 8.6; 5 TABLET ORAL at 20:58

## 2024-01-21 ASSESSMENT — PAIN DESCRIPTION - ORIENTATION: ORIENTATION: MID

## 2024-01-21 ASSESSMENT — PAIN SCALES - GENERAL
PAINLEVEL_OUTOF10: 3
PAINLEVEL_OUTOF10: 3
PAINLEVEL_OUTOF10: 0 - NO PAIN
PAINLEVEL_OUTOF10: 3
PAINLEVEL_OUTOF10: 8
PAINLEVEL_OUTOF10: 3
PAINLEVEL_OUTOF10: 4
PAINLEVEL_OUTOF10: 6
PAINLEVEL_OUTOF10: 4

## 2024-01-21 ASSESSMENT — PAIN DESCRIPTION - DESCRIPTORS
DESCRIPTORS: ACHING

## 2024-01-21 ASSESSMENT — PAIN - FUNCTIONAL ASSESSMENT
PAIN_FUNCTIONAL_ASSESSMENT: 0-10

## 2024-01-21 ASSESSMENT — PAIN DESCRIPTION - LOCATION: LOCATION: CHEST

## 2024-01-21 NOTE — CARE PLAN
Problem: Pain - Adult  Goal: Verbalizes/displays adequate comfort level or baseline comfort level  Outcome: Progressing     Problem: Safety - Adult  Goal: Free from fall injury  Outcome: Progressing     Problem: Discharge Planning  Goal: Discharge to home or other facility with appropriate resources  Outcome: Progressing     Problem: Chronic Conditions and Co-morbidities  Goal: Patient's chronic conditions and co-morbidity symptoms are monitored and maintained or improved  Outcome: Progressing     Problem: Skin  Goal: Decreased wound size/increased tissue granulation at next dressing change  Outcome: Progressing  Goal: Participates in plan/prevention/treatment measures  Outcome: Progressing  Goal: Prevent/manage excess moisture  Outcome: Progressing  Goal: Prevent/minimize sheer/friction injuries  Outcome: Progressing  Goal: Promote/optimize nutrition  Outcome: Progressing  Goal: Promote skin healing  Outcome: Progressing     Problem: Pain  Goal: Takes deep breaths with improved pain control throughout the shift  Outcome: Progressing  Goal: Turns in bed with improved pain control throughout the shift  Outcome: Progressing  Goal: Walks with improved pain control throughout the shift  Outcome: Progressing  Goal: Performs ADL's with improved pain control throughout shift  Outcome: Progressing  Goal: Participates in PT with improved pain control throughout the shift  Outcome: Progressing  Goal: Free from opioid side effects throughout the shift  Outcome: Progressing  Goal: Free from acute confusion related to pain meds throughout the shift  Outcome: Progressing     Problem: Fall/Injury  Goal: Not fall by end of shift  Outcome: Progressing  Goal: Be free from injury by end of the shift  Outcome: Progressing  Goal: Verbalize understanding of personal risk factors for fall in the hospital  Outcome: Progressing  Goal: Verbalize understanding of risk factor reduction measures to prevent injury from fall in the  home  Outcome: Progressing  Goal: Use assistive devices by end of the shift  Outcome: Progressing  Goal: Pace activities to prevent fatigue by end of the shift  Outcome: Progressing     Problem: DVT/VTE Prevention/Activity  Goal: No decrease in circulation/sensation  Outcome: Progressing  Goal: Prevent skin breakdown  Outcome: Progressing  Goal: Return to preop oxygenation status  Outcome: Progressing  Goal: Tolerates optimal activity  Outcome: Progressing  Goal: Increase self care and/or family involvement in 24 hrs.  Outcome: Progressing     Problem: Wound care/infection prevention  Goal: No signs of infection in 24 hrs.  Outcome: Progressing  Goal: No unexpected bleeding from incision this shift  Outcome: Progressing     Problem: Diet/fluid balance  Goal: Adequate urinary output  Outcome: Progressing  Goal: Free from nausea/vomiting  Outcome: Progressing  Goal: Return in bowel function  Outcome: Progressing  Goal: Tolerates prescribed diet  Outcome: Progressing     Problem: Other goals  Goal: No change in neurological status  Outcome: Progressing  Goal: Stabilize vital signs (return to 10% of baseline)  Outcome: Progressing     Problem: Respiratory  Goal: Clear secretions with interventions this shift  Outcome: Progressing  Goal: Minimize anxiety/maximize coping throughout shift  Outcome: Progressing  Goal: Minimal/no exertional discomfort or dyspnea this shift  Outcome: Progressing  Goal: No signs of respiratory distress (eg. Use of accessory muscles. Peds grunting)  Outcome: Progressing  Goal: Patent airway maintained this shift  Outcome: Progressing  Goal: Tolerate pulmonary toileting this shift  Outcome: Progressing  Goal: Verbalize decreased shortness of breath this shift  Outcome: Progressing  Goal: Wean oxygen to maintain O2 saturation per order/standard this shift  Outcome: Progressing  Goal: Increase self care and/or family involvement in next 24 hours  Outcome: Progressing     Problem: Pain  Goal: STG -  Pain is manageable through therapies  Outcome: Progressing   The patient's goals for the shift include      The clinical goals for the shift include pt will remain hemodynamically stable

## 2024-01-21 NOTE — CARE PLAN
The patient's goals for the shift include  decrease pain    The clinical goals for the shift include pt to remain hemodynamically stable    Over the shift, the patient did not make progress toward the following goals. Barriers to progression include need for pain medications. Recommendations to address these barriers include provide pharmacological and nonpharmacological pain relief methods.    Problem: Pain  Goal: Participates in PT with improved pain control throughout the shift  1/21/2024 0036 by Charo Benton RN  Outcome: Not Progressing  1/21/2024 0036 by Charo Benton RN  Outcome: Progressing  Goal: Free from acute confusion related to pain meds throughout the shift  1/21/2024 0036 by Charo Benton RN  Outcome: Not Progressing  1/21/2024 0036 by Charo Benton RN  Outcome: Progressing       Problem: Safety - Adult  Goal: Free from fall injury  1/21/2024 0036 by Charo Benton RN  Outcome: Progressing  1/21/2024 0036 by Charo Benton RN  Outcome: Progressing     Problem: Chronic Conditions and Co-morbidities  Goal: Patient's chronic conditions and co-morbidity symptoms are monitored and maintained or improved  1/21/2024 0036 by Charo Benton RN  Outcome: Progressing  1/21/2024 0036 by Charo Benton RN  Outcome: Progressing     Problem: Skin  Goal: Decreased wound size/increased tissue granulation at next dressing change  1/21/2024 0036 by Charo Benton RN  Outcome: Progressing  1/21/2024 0036 by Charo Benton RN  Outcome: Progressing  Goal: Participates in plan/prevention/treatment measures  1/21/2024 0036 by Charo Benton RN  Outcome: Progressing  1/21/2024 0036 by Charo Benton RN  Outcome: Progressing  Goal: Prevent/manage excess moisture  1/21/2024 0036 by Charo Benton RN  Outcome: Progressing  1/21/2024 0036 by Charo Benton RN  Outcome: Progressing  Goal: Prevent/minimize sheer/friction injuries  1/21/2024 0036 by Charo Benton RN  Outcome: Progressing  1/21/2024 0036 by Charo Benton  RN  Outcome: Progressing  Goal: Promote/optimize nutrition  1/21/2024 0036 by Charo Benton RN  Outcome: Progressing  1/21/2024 0036 by Charo Benton RN  Outcome: Progressing  Goal: Promote skin healing  1/21/2024 0036 by Charo Benton RN  Outcome: Progressing  1/21/2024 0036 by Charo Benton RN  Outcome: Progressing

## 2024-01-21 NOTE — PROGRESS NOTES
Subjective   Had difficulty sleeping due to incisional pain and coughing overnight. Currently on 2L NC. Passing flatulence but no BM yet. Remains on low-dose Epinephrine. CTs remain in place with minimal output overnight.    Plan to wean of Epinephrine. Will order Toradol 30mg IVP given his ongoing incisional pain. CT surgery tentatively planning to remove CTs today.    Objective   Physical Exam:   Constitutional: awake, alert  ENMT: mucous membranes moist, EOMI, conjunctivae clear  Head/Neck: normocephalic, atraumatic; supple, trachea midline  Respiratory/Thorax: diminished breath sounds but otherwise clear  Cardiovascular: RRR, no murmur appreciated  Gastrointestinal: soft, nondistended, non-tender, bowel sounds appreciated  Extremities: palpable peripheral pulses, no edema  Neurological: AO x3, no focal deficits  Psychological: pleasant, cooperative  Skin: warm and dry    Scheduled Medications:   acetaminophen, 975 mg, oral, q6h  atorvastatin, 80 mg, oral, Daily  calcium carbonate-vitamin D3, 1 tablet, oral, Daily  cholecalciferol, 5,000 Units, oral, Daily  [Held by provider] enoxaparin, 40 mg, subcutaneous, Daily  escitalopram, 10 mg, oral, BID  furosemide, 20 mg, intravenous, BID  gabapentin, 100 mg, oral, TID  lidocaine, 1 patch, transdermal, q24h  magnesium oxide, 400 mg, oral, Daily  melatonin, 10 mg, oral, Nightly  metoprolol tartrate, 12.5 mg, oral, BID  pantoprazole, 40 mg, oral, Daily before breakfast  psyllium, 1 packet, oral, Daily  sennosides-docusate sodium, 2 tablet, oral, BID    Continuous Medications:   EPINEPHrine, 0.04 mcg/kg/min (Dosing Weight), Last Rate: 0.02 mcg/kg/min (01/21/24 0608)  lactated Ringer's, 5 mL/hr, Last Rate: 5 mL/hr (01/21/24 0608)    PRN Medications:   PRN medications: albumin human, bisacodyl, dextrose **OR** glucagon, fentaNYL, magnesium sulfate, magnesium sulfate, naloxone, ondansetron, oxyCODONE, oxyCODONE, oxygen, potassium chloride, potassium  chloride    Assessment/Plan   This is a 73-year-old male, with history of heavily calcified bicuspid aortic valve, HTN, HLD, CAD with prior MI, hx CVA, and COPD, who initially presented to [facility] on 1/19/24 for staged AVR.    Neurological:  #Postoperative pain  #Hx CVA  - Analgesia: per CTS  - Avoid excess caths & drains  - Monitor for ICU delirium    Cardiovascular:  #Calcified bicuspid AV s/p AVR on 1/19/24  #Postoperative vasoplegia  #CAD with prior MI  #Hx HTN  #Hx HLD  - Pressors: Epinephrine. Plan to wean off. Maintain MAP>65.  - Continue Atorvastatin. Hold home Metoprolol succinate.  - Diuresis per CTS once off pressors    Respiratory:  #Postoperative respiratory insufficiency  #Hx COPD  - Currently on 2L NC. Wean O2 as tolerated with goal SpO2 89-92% given hx COPD.   - CTs management per CT surgery. Tentatively planning to remove today.  - Encourage IS use    Gastrointestinal:  - Diet: Regular  - PPX: Protonix    Endocrine:  No hx DM.  - Goal -180s.    Renal:  No acute issues.  - Monitor & replete electrolytes PRN    Hematological:  #Postoperative blood loss anemia  #Thrombocytopenia  #Leukocytosis, suspect reactive  - Trend CBC.  - Hold DVT PPX for now given thrombocytopenia    Infection Disease:  S/p Ancef    Skin/MSK:  No acute issues.    ICU Checklist:  Antimicrobials: -  Oxygen: 2L NC   Drips: Epinephrine   Feeding/Fluids: Regular  Analgesia: Per CTS  Sedation: -   Thromboprophylaxis: SCDs  Ulcer prophylaxis: Protonix   Glycemic control: BGM  Bowel care: PRN  Indwelling catheters: [place], CTs x2 (1/19)  Lines: PIV, R brachial A (1/19), R IJ (1/19)  Restraints: -  Dispo: CICU   Code Status: FULL    This is a preliminary note written by the resident. Please wait for attending addendum for finalization of note and recommendations.    Gardenia Pan,   Internal Medicine PGY3

## 2024-01-21 NOTE — PROGRESS NOTES
"CTICU Progress Note  Jerel Drummond/[id number]    Admit Date: 1/19/2024  Hospital Length of Stay: 2   ICU Length of Stay: 2d 2h   CT SURGEON: Dr. Eddy    SUBJECTIVE:   Extubated POD 0.  Overnight had CI < 2.0 and SvO2 ~ 50.    On levophed 0.05 throughout the night.     MEDICATIONS  Infusions:  EPINEPHrine, Last Rate: 0.02 mcg/kg/min (01/21/24 0608)  lactated Ringer's, Last Rate: 5 mL/hr (01/21/24 0608)      Scheduled:  acetaminophen, 975 mg, q6h  atorvastatin, 80 mg, Daily  calcium carbonate-vitamin D3, 1 tablet, Daily  cholecalciferol, 5,000 Units, Daily  enoxaparin, 40 mg, Daily  escitalopram, 10 mg, BID  gabapentin, 100 mg, TID  insulin lispro, 0-10 Units, TID with meals  lidocaine, 1 patch, q24h  magnesium oxide, 400 mg, Daily  melatonin, 10 mg, Nightly  pantoprazole, 40 mg, Daily before breakfast  psyllium, 1 packet, Daily  sennosides-docusate sodium, 2 tablet, BID      PRN:  albumin human, 12.5 g, PRN  bisacodyl, 10 mg, Daily PRN  dextrose, 25 g, q15 min PRN   Or  glucagon, 1 mg, q15 min PRN  fentaNYL, 25 mcg, q1h PRN  magnesium sulfate, 2 g, q6h PRN  magnesium sulfate, 4 g, q6h PRN  naloxone, 0.2 mg, q5 min PRN  ondansetron, 4 mg, q4h PRN  oxyCODONE, 10 mg, q6h PRN  oxyCODONE, 5 mg, q6h PRN  oxygen, , Continuous PRN - O2/gases  potassium chloride, 20 mEq, q1h PRN  potassium chloride, 40 mEq, q2h PRN        PHYSICAL EXAM:   Visit Vitals  /53   Pulse 88   Temp 36.7 °C (98.1 °F) (Temporal)   Resp 24   Ht 1.829 m (6' 0.01\")   Wt 112 kg (246 lb 0.5 oz)   SpO2 94%   BMI 33.36 kg/m²   Smoking Status Former   BSA 2.39 m²     Wt Readings from Last 5 Encounters:   01/21/24 112 kg (246 lb 0.5 oz)   01/16/24 108 kg (237 lb 7 oz)   11/08/23 105 kg (231 lb)   01/19/23 107 kg (235 lb 0.2 oz)     INTAKE/OUTPUT:  I/O last 3 completed shifts:  In: 6623 (59 mL/kg) [P.O.:2500; I.V.:639.6 (5.7 mL/kg); IV Piggyback:3483.3]  Out: 3700 (32.9 mL/kg) [Urine:2905 (0.7 mL/kg/hr); Chest Tube:795]  Weight: 112.3 kg        LDA:  CVC " 01/19/24 Single lumen Right Internal jugular (Active)   Placement Date/Time: 01/19/24 (c) 0820   Hand Hygiene Performed Prior to CVC Insertion: Yes  Site Prep: Chlorhexidine   Site Prep Agent has Completely Dried Before Insertion: Yes  All 5 Sterile Barriers Used (Gloves, Gown, Cap, Mask, Large Sterile Radha...   Number of days: 1       Introducer 01/19/24 Internal jugular Right (Active)   Placement Date/Time: 01/19/24 (c) 0820   Hand Hygiene Completed: Yes  Location: Internal jugular  Orientation: Right  Placement Verified: Pressure tracing changes;Transesophageal echocardiogram   Number of days: 1       Arterial Line 01/19/24 Right Brachial (Active)   Placement Date/Time: 01/19/24 (c) 0758   Size: 20 G  Orientation: Right  Location: Brachial  Securement Method: Transparent dressing  Patient Tolerance: Tolerated well   Number of days: 1       Urethral Catheter Temperature probe 14 Fr. (Active)   Placement Date/Time: 01/19/24 0800   Hand Hygiene Completed: Yes  Catheter Type: Temperature probe  Tube Size (Fr.): 14 Fr.  Catheter Balloon Size: 10 mL  Urine Returned: Yes   Number of days: 1       Chest Tube 1 Mediastinal 28 Fr (Active)   Placement Date/Time: 01/19/24 1117   Hand Hygiene Completed: Yes  Tube Number: 1  Chest Tube Location: Mediastinal  Chest Tube Drain Tube Size (Fr): 28 Fr  Chest Tube Drainage System: Dry seal chest drain   Number of days: 1       Chest Tube 2 Left Pleural 28 Fr (Active)   Placement Date/Time: 01/19/24 1118   Hand Hygiene Completed: Yes  Tube Number: 2  Chest Tube Orientation: Left  Chest Tube Location: Pleural  Chest Tube Drain Tube Size (Fr): 28 Fr  Chest Tube Drainage System: Dry seal chest drain   Number of days: 1       Physical Exam:   PHYSICAL EXAM:  - GENERAL: No acute distress. Well-nourished.  - NEUROLOGIC: No focal neurological deficits. Cam negative.   - LUNGS: Diminished bases. No accessory muscle use. 4L NC.   - CARDIOVASCULAR: Regular rate and rhythm.  Epicardial wires  VVI@60.   - ABDOMEN: Soft, non-tender and non-distended.   - : Clear, yellow urine in sharp.   - EXTREMITIES: No edema. Non-tender.?  - SKIN: No rashes or lesions. Warm.  - PSYCHIATRIC: Calm and cooperative.       Images: Reviewed    Invasive Hemodynamics:    Most Recent Range Past 24hrs   BP (Art) 154/69 Arterial Line BP 1  Min: 131/50  Max: 162/61   MAP(Art) 96 mmHg Arterial Line MAP 1 (mmHg)   Min: 75 mmHg  Max: 96 mmHg   RA/CVP   No data recorded   PA 41/12 PAP  Min: 35/22  Max: 62/29   PA(mean) 23 mmHg PAP (Mean)  Min: 23 mmHg  Max: 44 mmHg   CO 6.5 L/min CO (L/min)  Min: 5.4 L/min  Max: 7.1 L/min   CI 2.8 L/min/m2 CI (L/min/m2)  Min: 2.2 L/min/m2  Max: 3.1 L/min/m2   Mixed Venous   No data recorded   SVR  923 (dyne*sec)/cm5 SVR (dyne*sec)/cm5  Min: 881 (dyne*sec)/cm5  Max: 960 (dyne*sec)/cm5       Daily Risk Screen:  Parenteral medication  critically ill patient who need accurate urinary output measurements        Assessment/Plan   Jerel Drummond is a 73 y.o. male with a PMH significant for Severe Calcific Bicuspid Aortic Valve Stenosis, CAD (previous STEMI), HTN, HLD and COPD who presents to [facility] on 1/19/24 for a staged aortic valve replacement.   Now s/p AVR, left pericardial window and Ligation, ligament of Hang and part of the LLEAP trial with Dr. Eddy on 1/19.    Daiy Events    1/21/24: Patient weaned off of inotropic therapy and has been without signs of decreased perfusion. Plna to discontinue swan-alfie catheter. Starting on beta blocakde with PO metoprolol tartrate 12.5mg BID and IV furosemide 20mg BID.     - Current O2 Requirements: 2L NC     Plan:  NEURO:  PMH of insomnia. Acute post operative pain, relatively well controlled. OOB to chair.   -->  - Serial neuro and pain assessments    - Scheduled Tylenol   - PRN oxycodone  - PRN fentanyl for pain   - Continue scheduled magnesium and gabapentin  - PT Consult, OOB to chair as tolerated  - CAM ICU score  qshift  - Sleep/wake cycle hygiene  - Continue home lexapro   - Continue home melatonin 10 mg HS  - Hold home Ambien 5 mg     CV:  Patient has a history of CAD s/p PCI to RCA (1/2023), AVR, HTN, HLD, Now s/p Bio prosthetic AVR, left pericardial window and Ligation, ligament of Hang with Dr. Eddy on 1/19.  On levophed 0.05 overnight with ScVo2 ~50s and CI 1.7.  A/V epicardial wires set VVI@60, SR with 1 AV block 80s. Pa Pressures improved throughout afternoon to PA sys ~ 30s. -->  - Continuous EKG and ABP monitoring  - Weaned off of norepinephrine and epinephrine gtt   - Volume resuscitate as clinically indicated  - Maintain epicardial wires set  - Resume ticagrelor once Plt count recovers (consider transition to clopidogrel as patient will need to be started on apixaban 5mg BID for 3 months valve prophylaxis)  - Continue atorvastatin 80  - Starting on PO metoprolol tartrate 12.5mg BID     PULM:  No history of pulmonary disease.  Extubated POD 0. Left pleural and mediastinal chest tubes, no air leaks. 4L NC, IS ~ 500 mL. -->  - Daily CXR  - Wean FiO2 maintaining SpO2 >92%.   - ABGs as needed  - IS q1h and OOB to chair     GI:  PMH of GERD. Passed swallow evaluation. No Post op BM. -->  - Continue PPI  - Regular Diet  - Sennakot BID and metamucil      :  No history of renal disease, baseline CRE 0.9. Creatinine stable post-op. CSA-GEOVANNA Risk Score… Sharp in place and making adequate UOP. -->  - Continue sharp catheter for strict I/Os.    - Goal UOP 0.5ml/kg/hr  - RFP as clinically indicated  - Replete electrolytes per CTICU protocol     ENDO:  No endo history.  A1c: 5.7. -->  - Maintain BG <180, insulin per CTICU protocol     HEME:  Acute blood loss anemia & thrombocytopenia. -->    - Monitor drain output volume and characteristics  - CBC, coags, and fibrinogen post op and as clinically indicated  - Hold enoxaparin until plt count recovers (> 100,000)  - SCDs for DVT prophylaxis.     ID:  Afebrile, no current  indications of infection. MRSA: negative-->  - Trend temp q4h  - Periop Ancef 2G x 48hrs     Skin:    - arrived to ICU from OR with preventative Mepilex dressings in place on sacrum and heels  - change preventative Mepilex weekly or more frequently as indicated (when moist/soiled)   - every shift skin assessment per nursing and weekly ICU skin rounds  - moisture barrier to be applied with rick care  - active skin problems addressed with nursing on daily rounds Pressure ulcer/skin tear/DTI      Proph:  SCDs  PPI  Lovenox     G:  Line  Thorne 1/19    F: Family: Will update when available      A,B,C,D,E,F,G: reviewed    Restraints: Not indicated    Code status: Full Code     A,B,C,D,E,F,G: reviewed     Dispo: CTICU care for now.      Above patient and plan discussed with cardiac surgeon, Dr. Ramiro Caban.     Yaya Argueta, APRN-CNP

## 2024-01-22 ENCOUNTER — APPOINTMENT (OUTPATIENT)
Dept: RADIOLOGY | Facility: HOSPITAL | Age: 74
DRG: 219 | End: 2024-01-22
Payer: MEDICARE

## 2024-01-22 LAB
ALBUMIN SERPL BCP-MCNC: 3.1 G/DL (ref 3.4–5)
ANION GAP SERPL CALC-SCNC: <7 MMOL/L (ref 10–20)
BASE EXCESS BLDV CALC-SCNC: 0.3 MMOL/L (ref -2–3)
BODY TEMPERATURE: 37 DEGREES CELSIUS
BUN SERPL-MCNC: 24 MG/DL (ref 6–23)
CALCIUM SERPL-MCNC: 8.1 MG/DL (ref 8.6–10.3)
CHLORIDE SERPL-SCNC: 102 MMOL/L (ref 98–107)
CO2 SERPL-SCNC: 30 MMOL/L (ref 21–32)
CREAT SERPL-MCNC: 1.15 MG/DL (ref 0.5–1.3)
EGFRCR SERPLBLD CKD-EPI 2021: 66 ML/MIN/1.73M*2
ERYTHROCYTE [DISTWIDTH] IN BLOOD BY AUTOMATED COUNT: 14.2 % (ref 11.5–14.5)
GLUCOSE BLD MANUAL STRIP-MCNC: 105 MG/DL (ref 74–99)
GLUCOSE BLD MANUAL STRIP-MCNC: 120 MG/DL (ref 74–99)
GLUCOSE BLD MANUAL STRIP-MCNC: 125 MG/DL (ref 74–99)
GLUCOSE BLD MANUAL STRIP-MCNC: 147 MG/DL (ref 74–99)
GLUCOSE SERPL-MCNC: 124 MG/DL (ref 74–99)
HCO3 BLDV-SCNC: 27.3 MMOL/L (ref 22–26)
HCT VFR BLD AUTO: 27.4 % (ref 41–52)
HGB BLD-MCNC: 8.6 G/DL (ref 13.5–17.5)
INHALED O2 CONCENTRATION: 40 %
MAGNESIUM SERPL-MCNC: 2.17 MG/DL (ref 1.6–2.4)
MCH RBC QN AUTO: 30 PG (ref 26–34)
MCHC RBC AUTO-ENTMCNC: 31.4 G/DL (ref 32–36)
MCV RBC AUTO: 96 FL (ref 80–100)
NRBC BLD-RTO: 0 /100 WBCS (ref 0–0)
OXYHGB MFR BLDV: 62.3 % (ref 45–75)
PCO2 BLDV: 53 MM HG (ref 41–51)
PH BLDV: 7.32 PH (ref 7.33–7.43)
PHOSPHATE SERPL-MCNC: 2.4 MG/DL (ref 2.5–4.9)
PLATELET # BLD AUTO: 68 X10*3/UL (ref 150–450)
PO2 BLDV: 40 MM HG (ref 35–45)
POTASSIUM SERPL-SCNC: 4.4 MMOL/L (ref 3.5–5.3)
RBC # BLD AUTO: 2.87 X10*6/UL (ref 4.5–5.9)
SAO2 % BLDV: 64 % (ref 45–75)
SODIUM SERPL-SCNC: 134 MMOL/L (ref 136–145)
WBC # BLD AUTO: 10.6 X10*3/UL (ref 4.4–11.3)

## 2024-01-22 PROCEDURE — 85027 COMPLETE CBC AUTOMATED: CPT | Performed by: NURSE PRACTITIONER

## 2024-01-22 PROCEDURE — 2500000001 HC RX 250 WO HCPCS SELF ADMINISTERED DRUGS (ALT 637 FOR MEDICARE OP)

## 2024-01-22 PROCEDURE — 36415 COLL VENOUS BLD VENIPUNCTURE: CPT | Performed by: NURSE PRACTITIONER

## 2024-01-22 PROCEDURE — 2500000004 HC RX 250 GENERAL PHARMACY W/ HCPCS (ALT 636 FOR OP/ED): Performed by: STUDENT IN AN ORGANIZED HEALTH CARE EDUCATION/TRAINING PROGRAM

## 2024-01-22 PROCEDURE — 82947 ASSAY GLUCOSE BLOOD QUANT: CPT

## 2024-01-22 PROCEDURE — 2500000001 HC RX 250 WO HCPCS SELF ADMINISTERED DRUGS (ALT 637 FOR MEDICARE OP): Performed by: NURSE PRACTITIONER

## 2024-01-22 PROCEDURE — 2060000001 HC INTERMEDIATE ICU ROOM DAILY

## 2024-01-22 PROCEDURE — 97535 SELF CARE MNGMENT TRAINING: CPT | Mod: GO

## 2024-01-22 PROCEDURE — 83735 ASSAY OF MAGNESIUM: CPT | Performed by: NURSE PRACTITIONER

## 2024-01-22 PROCEDURE — 97530 THERAPEUTIC ACTIVITIES: CPT | Mod: GP

## 2024-01-22 PROCEDURE — 99291 CRITICAL CARE FIRST HOUR: CPT

## 2024-01-22 PROCEDURE — 2500000001 HC RX 250 WO HCPCS SELF ADMINISTERED DRUGS (ALT 637 FOR MEDICARE OP): Performed by: ANESTHESIOLOGY

## 2024-01-22 PROCEDURE — 71045 X-RAY EXAM CHEST 1 VIEW: CPT | Performed by: RADIOLOGY

## 2024-01-22 PROCEDURE — 71045 X-RAY EXAM CHEST 1 VIEW: CPT

## 2024-01-22 PROCEDURE — 80069 RENAL FUNCTION PANEL: CPT | Performed by: NURSE PRACTITIONER

## 2024-01-22 PROCEDURE — 99233 SBSQ HOSP IP/OBS HIGH 50: CPT

## 2024-01-22 PROCEDURE — 2500000004 HC RX 250 GENERAL PHARMACY W/ HCPCS (ALT 636 FOR OP/ED)

## 2024-01-22 PROCEDURE — 2500000004 HC RX 250 GENERAL PHARMACY W/ HCPCS (ALT 636 FOR OP/ED): Performed by: NURSE PRACTITIONER

## 2024-01-22 RX ORDER — METOPROLOL TARTRATE 25 MG/1
25 TABLET, FILM COATED ORAL 2 TIMES DAILY
Status: DISCONTINUED | OUTPATIENT
Start: 2024-01-22 | End: 2024-01-23

## 2024-01-22 RX ORDER — POLYETHYLENE GLYCOL 3350 17 G/17G
17 POWDER, FOR SOLUTION ORAL DAILY
Status: DISCONTINUED | OUTPATIENT
Start: 2024-01-22 | End: 2024-01-22

## 2024-01-22 RX ORDER — SODIUM,POTASSIUM PHOSPHATES 280-250MG
1 POWDER IN PACKET (EA) ORAL 4 TIMES DAILY
Status: COMPLETED | OUTPATIENT
Start: 2024-01-22 | End: 2024-01-22

## 2024-01-22 RX ORDER — POLYETHYLENE GLYCOL 3350 17 G/17G
17 POWDER, FOR SOLUTION ORAL 2 TIMES DAILY
Status: DISCONTINUED | OUTPATIENT
Start: 2024-01-22 | End: 2024-01-24 | Stop reason: HOSPADM

## 2024-01-22 RX ADMIN — SENNOSIDES AND DOCUSATE SODIUM 2 TABLET: 8.6; 5 TABLET ORAL at 20:20

## 2024-01-22 RX ADMIN — POTASSIUM & SODIUM PHOSPHATES POWDER PACK 280-160-250 MG 1 PACKET: 280-160-250 PACK at 14:55

## 2024-01-22 RX ADMIN — Medication 10 MG: at 20:20

## 2024-01-22 RX ADMIN — METOPROLOL TARTRATE 12.5 MG: 25 TABLET, FILM COATED ORAL at 08:00

## 2024-01-22 RX ADMIN — GABAPENTIN 100 MG: 100 CAPSULE ORAL at 20:20

## 2024-01-22 RX ADMIN — MAGNESIUM OXIDE TAB 400 MG (241.3 MG ELEMENTAL MG) 400 MG: 400 (241.3 MG) TAB at 08:00

## 2024-01-22 RX ADMIN — PANTOPRAZOLE SODIUM 40 MG: 40 TABLET, DELAYED RELEASE ORAL at 07:08

## 2024-01-22 RX ADMIN — OXYCODONE HYDROCHLORIDE 10 MG: 5 TABLET ORAL at 08:01

## 2024-01-22 RX ADMIN — ATORVASTATIN CALCIUM 80 MG: 80 TABLET, FILM COATED ORAL at 08:00

## 2024-01-22 RX ADMIN — ACETAMINOPHEN 975 MG: 325 TABLET ORAL at 07:08

## 2024-01-22 RX ADMIN — PSYLLIUM HUSK 1 PACKET: 3.4 POWDER ORAL at 08:01

## 2024-01-22 RX ADMIN — ACETAMINOPHEN 975 MG: 325 TABLET ORAL at 12:26

## 2024-01-22 RX ADMIN — ESCITALOPRAM OXALATE 10 MG: 10 TABLET ORAL at 20:20

## 2024-01-22 RX ADMIN — Medication 1 TABLET: at 08:05

## 2024-01-22 RX ADMIN — ESCITALOPRAM OXALATE 10 MG: 10 TABLET ORAL at 08:00

## 2024-01-22 RX ADMIN — APIXABAN 5 MG: 5 TABLET, FILM COATED ORAL at 20:20

## 2024-01-22 RX ADMIN — GABAPENTIN 100 MG: 100 CAPSULE ORAL at 08:00

## 2024-01-22 RX ADMIN — POLYETHYLENE GLYCOL 3350 17 G: 17 POWDER, FOR SOLUTION ORAL at 20:20

## 2024-01-22 RX ADMIN — CHOLECALCIFEROL TAB 25 MCG (1000 UNIT) 5000 UNITS: 25 TAB at 08:00

## 2024-01-22 RX ADMIN — GABAPENTIN 100 MG: 100 CAPSULE ORAL at 14:55

## 2024-01-22 RX ADMIN — POTASSIUM & SODIUM PHOSPHATES POWDER PACK 280-160-250 MG 1 PACKET: 280-160-250 PACK at 09:34

## 2024-01-22 RX ADMIN — POLYETHYLENE GLYCOL 3350 17 G: 17 POWDER, FOR SOLUTION ORAL at 09:34

## 2024-01-22 RX ADMIN — OXYCODONE HYDROCHLORIDE 10 MG: 5 TABLET ORAL at 20:19

## 2024-01-22 RX ADMIN — ACETAMINOPHEN 975 MG: 325 TABLET ORAL at 20:13

## 2024-01-22 RX ADMIN — METOPROLOL TARTRATE 25 MG: 25 TABLET, FILM COATED ORAL at 20:20

## 2024-01-22 RX ADMIN — SENNOSIDES AND DOCUSATE SODIUM 2 TABLET: 8.6; 5 TABLET ORAL at 08:00

## 2024-01-22 RX ADMIN — FUROSEMIDE 20 MG: 10 INJECTION, SOLUTION INTRAMUSCULAR; INTRAVENOUS at 08:05

## 2024-01-22 ASSESSMENT — PAIN - FUNCTIONAL ASSESSMENT
PAIN_FUNCTIONAL_ASSESSMENT: 0-10

## 2024-01-22 ASSESSMENT — COGNITIVE AND FUNCTIONAL STATUS - GENERAL
MOVING FROM LYING ON BACK TO SITTING ON SIDE OF FLAT BED WITH BEDRAILS: A LITTLE
TURNING FROM BACK TO SIDE WHILE IN FLAT BAD: A LITTLE
TOILETING: A LOT
HELP NEEDED FOR BATHING: A LOT
MOBILITY SCORE: 18
DAILY ACTIVITIY SCORE: 24
MOBILITY SCORE: 24
DAILY ACTIVITIY SCORE: 17
STANDING UP FROM CHAIR USING ARMS: A LITTLE
CLIMB 3 TO 5 STEPS WITH RAILING: A LITTLE
WALKING IN HOSPITAL ROOM: A LITTLE
MOVING TO AND FROM BED TO CHAIR: A LITTLE
DRESSING REGULAR LOWER BODY CLOTHING: A LOT
DRESSING REGULAR UPPER BODY CLOTHING: A LITTLE

## 2024-01-22 ASSESSMENT — PAIN DESCRIPTION - LOCATION: LOCATION: SHOULDER

## 2024-01-22 ASSESSMENT — PAIN SCALES - GENERAL
PAINLEVEL_OUTOF10: 9
PAINLEVEL_OUTOF10: 9
PAINLEVEL_OUTOF10: 7
PAINLEVEL_OUTOF10: 3
PAINLEVEL_OUTOF10: 7
PAINLEVEL_OUTOF10: 6
PAINLEVEL_OUTOF10: 0 - NO PAIN
PAINLEVEL_OUTOF10: 0 - NO PAIN

## 2024-01-22 ASSESSMENT — PAIN DESCRIPTION - DESCRIPTORS
DESCRIPTORS: ACHING
DESCRIPTORS: ACHING

## 2024-01-22 ASSESSMENT — ACTIVITIES OF DAILY LIVING (ADL): HOME_MANAGEMENT_TIME_ENTRY: 13

## 2024-01-22 ASSESSMENT — PAIN DESCRIPTION - ORIENTATION: ORIENTATION: LEFT

## 2024-01-22 NOTE — CARE PLAN
The patient's goals for the shift include    Problem: Pain - Adult  Goal: Verbalizes/displays adequate comfort level or baseline comfort level  Outcome: Progressing  Flowsheets (Taken 1/22/2024 1330)  Verbalizes/displays adequate comfort level or baseline comfort level: Encourage patient to monitor pain and request assistance     Problem: Safety - Adult  Goal: Free from fall injury  Outcome: Progressing  Note: Hourly rounding, nonskid footwear, call light within reach, chair/bed alarm on     Problem: Discharge Planning  Goal: Discharge to home or other facility with appropriate resources  Outcome: Progressing     Problem: Chronic Conditions and Co-morbidities  Goal: Patient's chronic conditions and co-morbidity symptoms are monitored and maintained or improved  Outcome: Progressing  Flowsheets (Taken 1/22/2024 1330)  Care Plan - Patient's Chronic Conditions and Co-Morbidity Symptoms are Monitored and Maintained or Improved: Monitor and assess patient's chronic conditions and comorbid symptoms for stability, deterioration, or improvement     Problem: Skin  Goal: Decreased wound size/increased tissue granulation at next dressing change  Outcome: Progressing  Flowsheets (Taken 1/22/2024 1330)  Decreased wound size/increased tissue granulation at next dressing change: Promote sleep for wound healing  Goal: Participates in plan/prevention/treatment measures  Outcome: Progressing  Flowsheets (Taken 1/22/2024 1330)  Participates in plan/prevention/treatment measures: Discuss with provider PT/OT consult  Goal: Prevent/manage excess moisture  Outcome: Progressing  Flowsheets (Taken 1/22/2024 1330)  Prevent/manage excess moisture: Cleanse incontinence/protect with barrier cream  Goal: Prevent/minimize sheer/friction injuries  Outcome: Progressing  Flowsheets (Taken 1/22/2024 1330)  Prevent/minimize sheer/friction injuries: Complete micro-shifts as needed if patient unable. Adjust patient position to relieve pressure points,  not a full turn  Goal: Promote/optimize nutrition  Outcome: Progressing  Flowsheets (Taken 1/22/2024 1330)  Promote/optimize nutrition: Assist with feeding  Goal: Promote skin healing  Outcome: Progressing  Flowsheets (Taken 1/22/2024 1330)  Promote skin healing: Assess skin/pad under line(s)/device(s)     Problem: Pain  Goal: Turns in bed with improved pain control throughout the shift  Outcome: Progressing  Goal: Walks with improved pain control throughout the shift  Outcome: Progressing  Goal: Performs ADL's with improved pain control throughout shift  Outcome: Progressing  Goal: Participates in PT with improved pain control throughout the shift  Outcome: Progressing  Goal: Free from opioid side effects throughout the shift  Outcome: Progressing  Goal: Free from acute confusion related to pain meds throughout the shift  Outcome: Progressing     Problem: Fall/Injury  Goal: Not fall by end of shift  Outcome: Progressing  Note: Hourly rounding, nonskid footwear, call light within reach, chair/bed alarm on  Goal: Be free from injury by end of the shift  Outcome: Progressing  Note: Hourly rounding, nonskid footwear, call light within reach, chair/bed alarm on  Goal: Verbalize understanding of personal risk factors for fall in the hospital  Outcome: Progressing  Goal: Verbalize understanding of risk factor reduction measures to prevent injury from fall in the home  Outcome: Progressing  Goal: Use assistive devices by end of the shift  Outcome: Progressing  Goal: Pace activities to prevent fatigue by end of the shift  Outcome: Progressing     Problem: DVT/VTE Prevention/Activity  Goal: No decrease in circulation/sensation  Outcome: Progressing  Flowsheets (Taken 1/22/2024 1330)  No decrease in circulation/sensation: SCDs/elevate extremities  Goal: Prevent skin breakdown  Outcome: Progressing  Flowsheets (Taken 1/22/2024 1330)  Prevent skin breakdown:   Ambulate/OOB 3 times daily   Encourage activity/mobility/ROM  Goal:  Return to preop oxygenation status  Outcome: Progressing  Goal: Tolerates optimal activity  Outcome: Progressing  Goal: Increase self care and/or family involvement in 24 hrs.  Outcome: Progressing     Problem: Diet/fluid balance  Goal: Free from nausea/vomiting  Outcome: Progressing  Goal: Return in bowel function  Outcome: Progressing  Goal: Tolerates prescribed diet  Outcome: Progressing     Problem: Other goals  Goal: No change in neurological status  Outcome: Progressing  Goal: Stabilize vital signs (return to 10% of baseline)  Outcome: Progressing     Problem: Respiratory  Goal: Minimize anxiety/maximize coping throughout shift  Outcome: Progressing  Goal: Minimal/no exertional discomfort or dyspnea this shift  Outcome: Progressing  Goal: Tolerate pulmonary toileting this shift  Outcome: Progressing  Goal: Verbalize decreased shortness of breath this shift  Outcome: Progressing  Goal: Wean oxygen to maintain O2 saturation per order/standard this shift  Outcome: Progressing  Goal: Increase self care and/or family involvement in next 24 hours  Outcome: Progressing       The clinical goals for the shift include pt will wean off O2 to RA.

## 2024-01-22 NOTE — PROGRESS NOTES
Occupational Therapy    OT Treatment    Patient Name: Jerel Drummond  MRN: [id number]  Today's Date: 1/22/2024  Time Calculation  Start Time: 1131  Stop Time: 1158  Time Calculation (min): 27 min         Assessment:  End of Session Communication: Bedside nurse  End of Session Patient Position: Up in chair, Alarm off, caregiver present, Alarm off, not on at start of session     Plan:  OT Frequency: 5 times per week  OT Discharge Recommendations: Low intensity level of continued care       Subjective   Previous Visit Info:  OT Last Visit  OT Received On: 01/22/24  General:  General  Family/Caregiver Present: Yes  Caregiver Feedback: spouse  Prior to Session Communication: Bedside nurse  Patient Position Received: Bed, 2 rail up, Alarm off, not on at start of session  Precautions:  Precautions Comment: MITT, cardiac  Vital Signs:  Vital Signs  SpO2:  (room air, spo2 90-96% throughout)  Pain:  Pain Assessment  Pain Assessment:  (no pain complaints)      Toileting  Toileting Adaptive Equipment:  (grab bar)  Toileting Level of Assistance:  (cga)  Toileting Comments: cues for pacing and for use of grab bar sit<> stand from toilet, pt. urinated only  Functional Standing Tolerance:  Functional Standing Tolerance Comments: pt. able to tolerate static stand 1st trial approx 1 min, 2nd trial x 45 seconds to 1 minute to wash hands at the sink, sba overall.  Bed Mobility/Transfers:    Sit<> stand from recliner;  sba       Ambulation/Gait Training:  Ambulation/Gait Training  Ambulation/Gait Training Performed:  (1st mobility chair>toilet>sink>chair without device required cga.  2nd trial of mobility via wh. walker in the hallway required sba.)  Therapy/Activity:      Therapeutic Activity  Therapeutic Activity Performed:  (pt. cued in MITT and energy conservation throughout session, required seated rest period between toileting and prolonged ambulation in the hallway.  VSS, no sob, minimal fatigue noted.)    Outcome Measures:[facility]  Daily Activity  Putting on and taking off regular lower body clothing: A lot  Bathing (including washing, rinsing, drying): A lot  Putting on and taking off regular upper body clothing: A little  Toileting, which includes using toilet, bedpan or urinal: A lot  Taking care of personal grooming such as brushing teeth: None  Eating Meals: None  Daily Activity - Total Score: 17        Education Documentation  Precautions, taught by Gladis Mcgowan OT at 1/22/2024  2:23 PM.  Learner: Patient  Readiness: Acceptance  Method: Explanation  Response: Verbalizes Understanding, Needs Reinforcement    Education Comments  No comments found.        OP EDUCATION:       Goals:  Encounter Problems       Encounter Problems (Active)       ADLs       Patient will perform UB and LB bathing  with modified independent level of assistance . (Progressing)       Start:  01/20/24    Expected End:  01/26/24            Patient with complete lower body dressing with modified independent level of assistance donning and doffing all LE clothes  with PRN adaptive equipment while edge of bed  (Progressing)       Start:  01/20/24    Expected End:  01/26/24            Patient will complete toileting including hygiene clothing management/hygiene with independent level of assistance and raised toilet seat and grab bars. (Progressing)       Start:  01/20/24    Expected End:  01/26/24                               TRANSFERS       Patient will perform bed mobility independent level of assistance and bed rails in order to improve safety and independence with mobility (Progressing)       Start:  01/20/24    Expected End:  01/26/24

## 2024-01-22 NOTE — CARE PLAN
The clinical goals for the shift include patient will remain hemodynamically stable    Over the shift, the patient did not make progress toward the following goals. Barriers to progression include patient converting into atrial fibrillation (maintained stable blood pressure). Recommendations to address these barriers include continuing current healthcare regimen, give Lopressor IV PRN as ordered.    Patient converted into A-fib with a varying rate from 100-140. Patient maintained a stable blood pressure. Lopressor 5mg Iv was administered. Patient converted back to normal sinus rhythm after he fell asleep in bed. Patient ambulated approximately 15 feet this morning and was mildly uncoordinated. Patient attempted a bowel movement on the bedside commode but was unsuccessful. No other significant events to report.   Problem: Pain - Adult  Goal: Verbalizes/displays adequate comfort level or baseline comfort level  Outcome: Progressing     Problem: Safety - Adult  Goal: Free from fall injury  Outcome: Progressing     Problem: Discharge Planning  Goal: Discharge to home or other facility with appropriate resources  Outcome: Progressing     Problem: Chronic Conditions and Co-morbidities  Goal: Patient's chronic conditions and co-morbidity symptoms are monitored and maintained or improved  Outcome: Progressing     Problem: Skin  Goal: Decreased wound size/increased tissue granulation at next dressing change  Outcome: Progressing  Goal: Participates in plan/prevention/treatment measures  Outcome: Progressing  Goal: Prevent/manage excess moisture  Outcome: Progressing  Goal: Prevent/minimize sheer/friction injuries  Outcome: Progressing  Goal: Promote/optimize nutrition  Outcome: Progressing  Goal: Promote skin healing  Outcome: Progressing     Problem: Pain  Goal: Turns in bed with improved pain control throughout the shift  Outcome: Progressing  Goal: Walks with improved pain control throughout the shift  Outcome:  Progressing  Goal: Performs ADL's with improved pain control throughout shift  Outcome: Progressing  Goal: Participates in PT with improved pain control throughout the shift  Outcome: Progressing  Goal: Free from opioid side effects throughout the shift  Outcome: Progressing  Goal: Free from acute confusion related to pain meds throughout the shift  Outcome: Progressing     Problem: Fall/Injury  Goal: Not fall by end of shift  Outcome: Progressing  Goal: Be free from injury by end of the shift  Outcome: Progressing  Goal: Verbalize understanding of personal risk factors for fall in the hospital  Outcome: Progressing  Goal: Verbalize understanding of risk factor reduction measures to prevent injury from fall in the home  Outcome: Progressing  Goal: Use assistive devices by end of the shift  Outcome: Progressing  Goal: Pace activities to prevent fatigue by end of the shift  Outcome: Progressing     Problem: DVT/VTE Prevention/Activity  Goal: No decrease in circulation/sensation  Outcome: Progressing  Goal: Prevent skin breakdown  Outcome: Progressing  Goal: Return to preop oxygenation status  Outcome: Progressing  Goal: Increase self care and/or family involvement in 24 hrs.  Outcome: Progressing     Problem: Diet/fluid balance  Goal: Free from nausea/vomiting  Outcome: Progressing  Goal: Return in bowel function  Outcome: Progressing  Goal: Tolerates prescribed diet  Outcome: Progressing     Problem: Other goals  Goal: No change in neurological status  Outcome: Progressing  Goal: Stabilize vital signs (return to 10% of baseline)  Outcome: Progressing     Problem: Respiratory  Goal: Minimize anxiety/maximize coping throughout shift  Outcome: Progressing  Goal: Minimal/no exertional discomfort or dyspnea this shift  Outcome: Progressing  Goal: Tolerate pulmonary toileting this shift  Outcome: Progressing  Goal: Verbalize decreased shortness of breath this shift  Outcome: Progressing  Goal: Wean oxygen to maintain O2  saturation per order/standard this shift  Outcome: Progressing  Goal: Increase self care and/or family involvement in next 24 hours  Outcome: Progressing        Problem: DVT/VTE Prevention/Activity  Goal: Tolerates optimal activity  Outcome: Not Progressing

## 2024-01-22 NOTE — PROGRESS NOTES
Physical Therapy    Physical Therapy Treatment    Patient Name: Jerel Drummond  MRN: [id number]  Today's Date: 1/22/2024  Time Calculation  Start Time: 1130  Stop Time: 1158  Time Calculation (min): 28 min       Assessment/Plan   PT Assessment  PT Assessment Results: Decreased strength, Decreased endurance, Impaired balance, Decreased mobility, Pain  Rehab Prognosis: Excellent  Evaluation/Treatment Tolerance: Patient limited by pain  Medical Staff Made Aware: Yes  End of Session Communication: Bedside nurse  Assessment Comment: 72 y/o male s/p AVR on 1/19/24 with tissue graft. reason for refferal to PT: post CT surgery who presents to PT with impaired mobility, balance and endurance who would benefit from continued in house PT and low intensity PT at d/c.  End of Session Patient Position: Up in chair, Alarm off, not on at start of session     PT Plan  PT Plan: Skilled PT  PT Frequency: 4 times per week  PT Discharge Recommendations: Low intensity level of continued care  PT Recommended Transfer Status: Assist x2  PT - OK to Discharge: Yes (when medically cleared)    Current Problem:  Patient Active Problem List   Diagnosis    Nonrheumatic aortic valve stenosis    Mitral stenosis    Callus    Chronic ischemic heart disease    Chronic obstructive lung disease (CMS/HCC)    Depressive disorder    Hypertension    Gastroesophageal reflux disease    Hammer toe    Herpes simplex without complication    History of malignant melanoma of skin    Insomnia    BILATERAL HANDS AND FEET    Onychomycosis of toenail    Pain in joint involving ankle and foot    Posttraumatic stress disorder    Calcific aortic stenosis    History of sleep apnea    Post-traumatic stress disorder, unspecified    Essential (primary) hypertension    Pre-op testing    Aortic valve stenosis, critical       General Visit Information:   PT  Visit  PT Received On: 01/22/24  General  Prior to Session Communication: Bedside nurse  Patient Position Received: Alarm  off, not on at start of session, Up in chair  General Comment: Pt sitting in chair upon entering, agreeable to PT. Pt demonstrates improvement in functional mobility. Update PT rec to low intensity PT upon DC. Wife present during session  Subjective     Precautions:  Precautions  Medical Precautions: Cardiac precautions  Post-Surgical Precautions: Move in the Tube    Vital Signs:  Vital Signs  Heart Rate:  (all VSS throughout session)  Objective     Pain:  Pain Assessment  Pain Assessment:  (pt denies pain)    Cognition:  Cognition  Overall Cognitive Status: Within Functional Limits      Treatments:  Therapeutic Exercise  Therapeutic Exercise Performed:  (while reclined in chair pt completes BLE ankle pumps x 20)           Ambulation/Gait Training  Ambulation/Gait Training Performed:  (Pt ambulates to and from toilet x 10-15ft x2 with no device with CGA. Pt ambulates in hallway with WW with SBA with no acute LOB. Pt reports previous injury on LLE that results in a limp. Cues provided for safe walker management.)  Transfers  Transfer:  (sit to stand with SBA with cues for technique)          Outcome Measures:  [facility] Basic Mobility  Turning from your back to your side while in a flat bed without using bedrails: A little  Moving from lying on your back to sitting on the side of a flat bed without using bedrails: A little  Moving to and from bed to chair (including a wheelchair): A little  Standing up from a chair using your arms (e.g. wheelchair or bedside chair): A little  To walk in hospital room: A little  Climbing 3-5 steps with railing: A little  Basic Mobility - Total Score: 18  Education Documentation  Precautions, taught by Aleida Osman PT at 1/22/2024  4:29 PM.  Learner: Patient  Readiness: Acceptance  Method: Explanation  Response: Verbalizes Understanding    Home Exercise Program, taught by Aleida Osman PT at 1/22/2024  4:29 PM.  Learner: Patient  Readiness: Acceptance  Method:  Explanation  Response: Verbalizes Understanding    Mobility Training, taught by Aleida Osman, PT at 1/22/2024  4:29 PM.  Learner: Patient  Readiness: Acceptance  Method: Explanation  Response: Verbalizes Understanding    Education Comments  No comments found.        EDUCATION:     Encounter Problems       Encounter Problems (Active)       Mobility       STG - Patient will ambulate (Progressing)       Start:  01/20/24    Expected End:  01/26/24       >/= 300 feet with adaptive vital sign and oxygenation response and modified independence and LRAD         STG - Patient will ambulate up and down a curb/step (Progressing)       Start:  01/20/24    Expected End:  01/26/24       With SBA and LRAD              Pain       Pt will tolerate all PT with pain </= 3/10         Pain - Adult          Transfers       STG - Transfer from bed to chair (Progressing)       Start:  01/20/24    Expected End:  01/26/24       With modified independence and LRAD           STG - Patient will transfer sit to and from stand (Progressing)       Start:  01/20/24    Expected End:  01/26/24       With modified independence and LRAD           Goal 1 (Progressing)       Start:  01/20/24    Expected End:  01/26/24       Pt will perform bed mobility with log roll and SBA

## 2024-01-22 NOTE — PROGRESS NOTES
"CTICU Progress Note  Jerel Drummond/[id number]    Admit Date: 1/19/2024  Hospital Length of Stay: 3   ICU Length of Stay: 2d 19h   CT SURGEON: Dr. Eddy    SUBJECTIVE:   No acute overnight events. Constipated, had BM this afternoon.    MEDICATIONS  Infusions:  lactated Ringer's, Last Rate: 5 mL/hr (01/21/24 0608)      Scheduled:  acetaminophen, 975 mg, q6h  atorvastatin, 80 mg, Daily  calcium carbonate-vitamin D3, 1 tablet, Daily  cholecalciferol, 5,000 Units, Daily  [Held by provider] enoxaparin, 40 mg, Daily  escitalopram, 10 mg, BID  [Held by provider] furosemide, 20 mg, BID  gabapentin, 100 mg, TID  lidocaine, 1 patch, q24h  magnesium oxide, 400 mg, Daily  melatonin, 10 mg, Nightly  metoprolol tartrate, 25 mg, BID  pantoprazole, 40 mg, Daily before breakfast  polyethylene glycol, 17 g, Daily  potassium, sodium phosphates, 1 packet, 4x daily  psyllium, 1 packet, Daily  sennosides-docusate sodium, 2 tablet, BID      PRN:  albumin human, 12.5 g, PRN  bisacodyl, 10 mg, Daily PRN  dextrose, 25 g, q15 min PRN   Or  glucagon, 1 mg, q15 min PRN  magnesium sulfate, 2 g, q6h PRN  magnesium sulfate, 4 g, q6h PRN  metoprolol, 5 mg, q1h PRN  naloxone, 0.2 mg, q5 min PRN  ondansetron, 4 mg, q4h PRN  oxyCODONE, 10 mg, q6h PRN  oxyCODONE, 5 mg, q6h PRN  oxygen, , Continuous PRN - O2/gases  potassium chloride, 20 mEq, q1h PRN  potassium chloride, 40 mEq, q2h PRN        PHYSICAL EXAM:   Visit Vitals  /62   Pulse 80   Temp 36.6 °C (97.9 °F) (Temporal)   Resp 20   Ht 1.829 m (6' 0.01\")   Wt 112 kg (246 lb 0.5 oz)   SpO2 97%   BMI 33.36 kg/m²   Smoking Status Former   BSA 2.39 m²     Wt Readings from Last 5 Encounters:   01/21/24 112 kg (246 lb 0.5 oz)   01/16/24 108 kg (237 lb 7 oz)   11/08/23 105 kg (231 lb)   01/19/23 107 kg (235 lb 0.2 oz)     INTAKE/OUTPUT:  I/O last 3 completed shifts:  In: 1126.3 (10.1 mL/kg) [P.O.:650; I.V.:276.3 (2.5 mL/kg); IV Piggyback:200]  Out: 3630 (32.5 mL/kg) [Urine:3555 (0.9 mL/kg/hr); Chest " Tube:75]  Weight: 111.6 kg        LDA:  CVC 01/19/24 Single lumen Right Internal jugular (Active)   Placement Date/Time: 01/19/24 (c) 0820   Hand Hygiene Performed Prior to CVC Insertion: Yes  Site Prep: Chlorhexidine   Site Prep Agent has Completely Dried Before Insertion: Yes  All 5 Sterile Barriers Used (Gloves, Gown, Cap, Mask, Large Sterile Radha...   Number of days: 1       Introducer 01/19/24 Internal jugular Right (Active)   Placement Date/Time: 01/19/24 (c) 0820   Hand Hygiene Completed: Yes  Location: Internal jugular  Orientation: Right  Placement Verified: Pressure tracing changes;Transesophageal echocardiogram   Number of days: 1       Arterial Line 01/19/24 Right Brachial (Active)   Placement Date/Time: 01/19/24 (c) 0759   Size: 20 G  Orientation: Right  Location: Brachial  Securement Method: Transparent dressing  Patient Tolerance: Tolerated well   Number of days: 1       Urethral Catheter Temperature probe 14 Fr. (Active)   Placement Date/Time: 01/19/24 0800   Hand Hygiene Completed: Yes  Catheter Type: Temperature probe  Tube Size (Fr.): 14 Fr.  Catheter Balloon Size: 10 mL  Urine Returned: Yes   Number of days: 1       Chest Tube 1 Mediastinal 28 Fr (Active)   Placement Date/Time: 01/19/24 1117   Hand Hygiene Completed: Yes  Tube Number: 1  Chest Tube Location: Mediastinal  Chest Tube Drain Tube Size (Fr): 28 Fr  Chest Tube Drainage System: Dry seal chest drain   Number of days: 1       Chest Tube 2 Left Pleural 28 Fr (Active)   Placement Date/Time: 01/19/24 1118   Hand Hygiene Completed: Yes  Tube Number: 2  Chest Tube Orientation: Left  Chest Tube Location: Pleural  Chest Tube Drain Tube Size (Fr): 28 Fr  Chest Tube Drainage System: Dry seal chest drain   Number of days: 1       Physical Exam:   PHYSICAL EXAM:  - GENERAL: No acute distress. Well-nourished.  - NEUROLOGIC: No focal neurological deficits. Cam negative.   - LUNGS: Diminished bases. No accessory muscle use. RA.  - CARDIOVASCULAR:  Regular rate and rhythm.  Epicardial wires VVI@60.   - ABDOMEN: Soft, non-tender and non-distended.   - : Voiding per urinal.  - EXTREMITIES: No edema. Non-tender.?  - SKIN: No rashes or lesions. Warm.  - PSYCHIATRIC: Calm and cooperative.       Images: Reviewed    Invasive Hemodynamics:    Most Recent Range Past 24hrs   BP (Art) 154/69 Arterial Line BP 1  Min: 154/69  Max: 154/69   MAP(Art) 96 mmHg Arterial Line MAP 1 (mmHg)   Min: 96 mmHg  Max: 96 mmHg   RA/CVP   No data recorded   PA 41/12 No data recorded   PA(mean) 23 mmHg No data recorded   CO 6.5 L/min No data recorded   CI 2.8 L/min/m2 No data recorded   Mixed Venous   No data recorded   SVR  923 (dyne*sec)/cm5 No data recorded       Daily Risk Screen:  Parenteral medication  critically ill patient who need accurate urinary output measurements        Assessment/Plan   Jerel Drummond is a 73 y.o. male with a PMH significant for Severe Calcific Bicuspid Aortic Valve Stenosis, CAD (previous STEMI), HTN, HLD and COPD who presents to [facility] on 1/19/24 for a staged aortic valve replacement.   Now s/p AVR, left pericardial window and Ligation, ligament of Hang and part of the LLEAP trial with Dr. Eddy on 1/19.    Daiy Events    1/21/24: Patient weaned off of inotropic therapy and has been without signs of decreased perfusion. Plna to discontinue swan-alfie catheter. Starting on beta blocakde with PO metoprolol tartrate 12.5mg BID and IV furosemide 20mg BID.     - Current O2 Requirements: 2L NC     1/22/24: Hemodynamically stable, on RA. Increased metoprolol to 25mg BID. Starting Eliquis 5mg BID for AVR prophylaxis. Continuing to hold antiplatelet therapy with thrombocytopenia, likely 2/2 pump run and valve consumption. Holding diuresis with GEOVANNA.     Plan:  NEURO:  PMH of insomnia. Acute post operative pain, relatively well controlled. Ambulating unit. Sleeping well.  -->  - Serial neuro and pain assessments    - Scheduled  Tylenol   - PRN oxycodone  - PRN fentanyl for pain   - Continue scheduled magnesium and gabapentin  - PT Consult, OOB to chair as tolerated  - CAM ICU score qshift  - Sleep/wake cycle hygiene  - Continue home lexapro   - Continue home melatonin 10 mg HS  - Hold home Ambien 5 mg     CV:  Patient has a history of CAD s/p PCI to RCA (1/2023), AVR, HTN, HLD, Now s/p Bio prosthetic AVR, left pericardial window and Ligation, ligament of Hang with Dr. Eddy on 1/19. Weaned off gtts 1/21. A/V epicardial wires set VVI@60, SR. Normotensive. -->  - Continuous EKG and ABP monitoring  - Goal MAP 70-90  - Maintain epicardial wires set  - On ticagrelor pre-op for PCI, however, it has been 1 year, so may be ok with just ASA (regardless, holding for now in setting of thrombocytopenia)  - Start apixaban 5mg BID for valve prophylaxis, to continue for 3 months  - Continue atorvastatin 80  - Increase metoprolol to 25 mg BID     PULM:  No history of pulmonary disease.  Extubated POD 0. Chest tubes removed. On RA. -->  - Daily CXR  - Wean FiO2 maintaining SpO2 >92%.   - ABGs as needed  - IS q1h and OOB to chair     GI:  PMH of GERD. Tolerating regular diet. Constipation, resolved. BM today. -->  - Continue PPI  - Regular Diet  - Sennakot BID and miralax BID     :  No history of renal disease, baseline CRE 0.9. Creatinine stable post-op, mildly increased. -->   - Goal negative  - Hold diuresis with increasing creatinine  - RFP daily  - Replete electrolytes per CTICU protocol     ENDO:  No endo history.  A1c: 5.7. Eugylcemic-->  - Maintain BG <180, monitor on daily RFP     HEME:  Acute blood loss anemia, stable. Thrombocytopenia likely 2/2 pump run and valve consumption  -->    - Monitor drain output volume and characteristics  - CBC, coags, and fibrinogen post op and as clinically indicated  - Start eliquis 5mg BID for valve prophylaxis  - Will need antiplatelet for CAD, holding in setting of thrombocytopenia  - SCDs for DVT  prophylaxis.     ID:  Afebrile, no current indications of infection. MRSA: negative. Completed rick-op ancef. -->  - Trend temp q4h  - Periop Ancef 2G x 48hrs     Skin:    - arrived to ICU from OR with preventative Mepilex dressings in place on sacrum and heels  - change preventative Mepilex weekly or more frequently as indicated (when moist/soiled)   - every shift skin assessment per nursing and weekly ICU skin rounds  - moisture barrier to be applied with rick care  - active skin problems addressed with nursing on daily rounds Pressure ulcer/skin tear/DTI      Proph:  SCDs  PPI  Eliquis     G:  Line  PIV    F: Family: Wife updated at bedside     A,B,C,D,E,F,G: reviewed    Restraints: Not indicated    Code status: Full Code     A,B,C,D,E,F,G: reviewed     Dispo: CTICU care for now.      Above patient and plan discussed with cardiac surgeon, Dr. Surjit Catherine, APRN-CNP

## 2024-01-22 NOTE — CONSULTS
"Nutrition Initial Assessment:   Nutrition Assessment    Reason for Assessment: Dietitian discretion (AVR 1/19/24; MST=0)    Patient is a 73 y.o. male presenting with: aortic valve stenosis; s/p AVR 1/19/    PmHx: CAD, STEMI, HTN, HLD, COPD, hx CVAs      Nutrition History:  Energy Intake: Fair 50-75 %  Food and Nutrient History: Pt sleeping soundly at time of attempted visit today.  POD#3 AVR  Vitamin/Herbal Supplement Use: vitamin D  Food Allergies/Intolerances:  None  GI Symptoms: None  Oral Problems: None       Anthropometrics:  Height: 182 cm (5' 11.65\")   Weight: 112 kg (246 lb 14.6 oz)   BMI (Calculated): 33.81  IBW/kg (Dietitian Calculated): 80.9 kg  Percent of IBW: 138 %       Weight History:     Weight Change %:  Weight History / % Weight Change: 1/20/24 112kg, 1/19/24 108kg, 1/16/24 108kg, 11/8/23 105kg, 1/19/23 107kg  Significant Weight Loss: No    Nutrition Focused Physical Exam Findings:  defer: sleeping   Subcutaneous Fat Loss:   Orbital Fat Pads: Defer  Muscle Wasting:     Edema:  Edema: +1 trace  Edema Location: generalized, BUE, BLE  Physical Findings:  Skin: Positive (L great toe wound; midsternal incision)    Nutrition Significant Labs:  CBC Trend:   Results from last 7 days   Lab Units 01/22/24  0534 01/21/24  0446 01/20/24  0426 01/19/24  1406   WBC AUTO x10*3/uL 10.6 13.4* 10.8 13.2*   RBC AUTO x10*6/uL 2.87* 2.95* 3.26* 3.77*   HEMOGLOBIN g/dL 8.6* 8.9* 9.9* 11.6*   HEMATOCRIT % 27.4* 27.2* 30.6* 35.1*   MCV fL 96 92 94 93   PLATELETS AUTO x10*3/uL 68* 70* 102* 146*    , BMP Trend:   Results from last 7 days   Lab Units 01/22/24  0534 01/21/24  0446 01/20/24  0426 01/19/24  1406   GLUCOSE mg/dL 124* 141* 146* 147*   CALCIUM mg/dL 8.1* 7.9* 8.0* 8.4*   SODIUM mmol/L 134* 133* 138 140   POTASSIUM mmol/L 4.4 3.8 4.4 4.4   CO2 mmol/L 30 27 27 25   CHLORIDE mmol/L 102 101 107 109*   BUN mg/dL 24* 17 22 18   CREATININE mg/dL 1.15 0.98 0.92 0.90    , A1C:  Lab Results   Component Value Date    HGBA1C " 5.7 (H) 01/16/2024   , BG POCT trend:   Results from last 7 days   Lab Units 01/22/24  0740 01/21/24  2056 01/21/24  1626 01/21/24  1141 01/21/24  0727   POCT GLUCOSE mg/dL 120* 115* 113* 105* 141*        Nutrition Specific Medications:  Reviewed    I/O:   Last BM Date: 01/18/24;          Dietary Orders (From admission, onward)       Start     Ordered    01/22/24 0641  Oral nutritional supplements  Until discontinued        Question Answer Comment   Deliver with Dinner    Deliver with Lunch    Select supplement: Herson        01/22/24 0640    01/22/24 0641  Oral nutritional supplements  Until discontinued        Question Answer Comment   Deliver with Breakfast    Select supplement: Ensure High Protein        01/22/24 0640    01/20/24 1137  Adult diet Regular  Diet effective now        Comments: Post-extubation, advance as tolerated   Question:  Diet type  Answer:  Regular    01/20/24 1136                     Estimated Needs:      Method for Estimating Needs: 2000-2200kcals (25-27kcals/kg IBW)     Method for Estimating Needs: 97-113g (1.2-1.4g/kg IBW)     Method for Estimating Needs: 1600ml or per MD        Nutrition Diagnosis   Malnutrition Diagnosis  Patient has Malnutrition Diagnosis: No    Nutrition Diagnosis  Patient has Nutrition Diagnosis: Yes  Diagnosis Status (1): New  Nutrition Diagnosis 1: Increased nutrient needs  Related to (1): physiological causes increasing nutrient needs  As Evidenced by (1): post op wound healing needs       Nutrition Interventions/Recommendations         Nutrition Prescription:  Individualized Nutrition Prescription Provided for : Regular diet with Herson twice daily and Ensure high protein once daily;  add Cardiac restriction once appetite improves        Nutrition Interventions:   Food and/or Nutrient Delivery Interventions  Interventions: Meals and snacks, Medical food supplement  Meals and Snacks: General healthful diet  Goal: consume >75% of meals  Medical Food Supplement:  Commercial beverage  Goal: consume 100% Herson twice daily (for an additional 90 kcals, 2.5 gm protein, supplement glutamine and arginine) and Ensure high protein once daily (for an additional 160kcals, 16g protein )  Additional Interventions: consider adding daily MVI with minerals         Nutrition Education:   Will provide diet education prior to discharge as appropriate       Nutrition Monitoring and Evaluation   Food/Nutrient Related History Monitoring  Monitoring and Evaluation Plan: Energy intake, Fluid intake, Amount of food  Energy Intake: Estimated energy intake  Criteria: Meal/ONS intake to meet >75% of estimated needs  Fluid Intake: Estimated fluid intake  Criteria: fluid intake to meet >75% of estimated need  Amount of Food: Estimated amout of food, Medical food intake  Criteria: Pt to consume >75% of meals/ONS    Body Composition/Growth/Weight History  Monitoring and Evaluation Plan: Weight  Weight: Measured weight  Criteria: reweigh daily    Biochemical Data, Medical Tests and Procedures  Monitoring and Evaluation Plan: Electrolyte/renal panel, Glucose/endocrine profile  Criteria: Labs WNL  Glucose/Endocrine Profile: Glucose, casual  Criteria: BG <180    Nutrition Focused Physical Findings  Monitoring and Evaluation Plan: Skin  Criteria: promote wound healing       Time Spent/Follow-up Reminder:   Time Spent (min): 45 minutes  Last Date of Nutrition Visit: 01/22/24  Nutrition Follow-Up Needed?: 3-5 days  Follow up Comment: 1/26/24 TG

## 2024-01-23 ENCOUNTER — APPOINTMENT (OUTPATIENT)
Dept: CARDIOLOGY | Facility: HOSPITAL | Age: 74
DRG: 219 | End: 2024-01-23
Payer: MEDICARE

## 2024-01-23 ENCOUNTER — APPOINTMENT (OUTPATIENT)
Dept: RADIOLOGY | Facility: HOSPITAL | Age: 74
DRG: 219 | End: 2024-01-23
Payer: MEDICARE

## 2024-01-23 DIAGNOSIS — Z95.2 S/P AORTIC VALVE REPLACEMENT: ICD-10-CM

## 2024-01-23 LAB
ALBUMIN SERPL BCP-MCNC: 3 G/DL (ref 3.4–5)
ANION GAP SERPL CALC-SCNC: 9 MMOL/L (ref 10–20)
AORTIC VALVE MEAN GRADIENT: 14 MMHG
AORTIC VALVE PEAK VELOCITY: 2.39 M/S
AV PEAK GRADIENT: 22.8 MMHG
AVA (PEAK VEL): 1.81 CM2
AVA (VTI): 1.85 CM2
BLOOD EXPIRATION DATE: NORMAL
BUN SERPL-MCNC: 23 MG/DL (ref 6–23)
CALCIUM SERPL-MCNC: 7.9 MG/DL (ref 8.6–10.3)
CHLORIDE SERPL-SCNC: 101 MMOL/L (ref 98–107)
CO2 SERPL-SCNC: 30 MMOL/L (ref 21–32)
CREAT SERPL-MCNC: 0.97 MG/DL (ref 0.5–1.3)
DISPENSE STATUS: NORMAL
EGFRCR SERPLBLD CKD-EPI 2021: 81 ML/MIN/1.73M*2
EJECTION FRACTION APICAL 4 CHAMBER: 62.3
EJECTION FRACTION: 61 %
ERYTHROCYTE [DISTWIDTH] IN BLOOD BY AUTOMATED COUNT: 14.3 % (ref 11.5–14.5)
GLUCOSE SERPL-MCNC: 120 MG/DL (ref 74–99)
HCT VFR BLD AUTO: 26.7 % (ref 41–52)
HGB BLD-MCNC: 8.5 G/DL (ref 13.5–17.5)
LEFT ATRIUM VOLUME AREA LENGTH INDEX BSA: 30.8 ML/M2
LEFT VENTRICLE INTERNAL DIMENSION DIASTOLE: 4.64 CM (ref 3.5–6)
LEFT VENTRICULAR OUTFLOW TRACT DIAMETER: 2.3 CM
MAGNESIUM SERPL-MCNC: 1.94 MG/DL (ref 1.6–2.4)
MCH RBC QN AUTO: 29.9 PG (ref 26–34)
MCHC RBC AUTO-ENTMCNC: 31.8 G/DL (ref 32–36)
MCV RBC AUTO: 94 FL (ref 80–100)
NRBC BLD-RTO: 0 /100 WBCS (ref 0–0)
PHOSPHATE SERPL-MCNC: 3 MG/DL (ref 2.5–4.9)
PLATELET # BLD AUTO: 88 X10*3/UL (ref 150–450)
POTASSIUM SERPL-SCNC: 4.3 MMOL/L (ref 3.5–5.3)
PRODUCT BLOOD TYPE: 9500
PRODUCT CODE: NORMAL
RBC # BLD AUTO: 2.84 X10*6/UL (ref 4.5–5.9)
RIGHT VENTRICLE FREE WALL PEAK S': 9.79 CM/S
RIGHT VENTRICLE PEAK SYSTOLIC PRESSURE: 27.8 MMHG
SODIUM SERPL-SCNC: 136 MMOL/L (ref 136–145)
TRICUSPID ANNULAR PLANE SYSTOLIC EXCURSION: 2 CM
UNIT ABO: NORMAL
UNIT NUMBER: NORMAL
UNIT RH: NORMAL
UNIT VOLUME: 350
UNIT VOLUME: 400
WBC # BLD AUTO: 8.7 X10*3/UL (ref 4.4–11.3)
XM INTEP: NORMAL

## 2024-01-23 PROCEDURE — 99233 SBSQ HOSP IP/OBS HIGH 50: CPT

## 2024-01-23 PROCEDURE — 97535 SELF CARE MNGMENT TRAINING: CPT | Mod: GO

## 2024-01-23 PROCEDURE — 71046 X-RAY EXAM CHEST 2 VIEWS: CPT | Performed by: STUDENT IN AN ORGANIZED HEALTH CARE EDUCATION/TRAINING PROGRAM

## 2024-01-23 PROCEDURE — 2500000004 HC RX 250 GENERAL PHARMACY W/ HCPCS (ALT 636 FOR OP/ED)

## 2024-01-23 PROCEDURE — 36415 COLL VENOUS BLD VENIPUNCTURE: CPT | Performed by: NURSE PRACTITIONER

## 2024-01-23 PROCEDURE — 80069 RENAL FUNCTION PANEL: CPT | Performed by: NURSE PRACTITIONER

## 2024-01-23 PROCEDURE — 97530 THERAPEUTIC ACTIVITIES: CPT | Mod: GP

## 2024-01-23 PROCEDURE — 93306 TTE W/DOPPLER COMPLETE: CPT

## 2024-01-23 PROCEDURE — 93306 TTE W/DOPPLER COMPLETE: CPT | Performed by: INTERNAL MEDICINE

## 2024-01-23 PROCEDURE — 71046 X-RAY EXAM CHEST 2 VIEWS: CPT

## 2024-01-23 PROCEDURE — 2500000001 HC RX 250 WO HCPCS SELF ADMINISTERED DRUGS (ALT 637 FOR MEDICARE OP)

## 2024-01-23 PROCEDURE — 83735 ASSAY OF MAGNESIUM: CPT | Performed by: NURSE PRACTITIONER

## 2024-01-23 PROCEDURE — 71045 X-RAY EXAM CHEST 1 VIEW: CPT

## 2024-01-23 PROCEDURE — 99291 CRITICAL CARE FIRST HOUR: CPT

## 2024-01-23 PROCEDURE — 2500000001 HC RX 250 WO HCPCS SELF ADMINISTERED DRUGS (ALT 637 FOR MEDICARE OP): Performed by: ANESTHESIOLOGY

## 2024-01-23 PROCEDURE — 85027 COMPLETE CBC AUTOMATED: CPT | Performed by: NURSE PRACTITIONER

## 2024-01-23 PROCEDURE — 2060000001 HC INTERMEDIATE ICU ROOM DAILY

## 2024-01-23 PROCEDURE — 71045 X-RAY EXAM CHEST 1 VIEW: CPT | Performed by: RADIOLOGY

## 2024-01-23 PROCEDURE — 2500000001 HC RX 250 WO HCPCS SELF ADMINISTERED DRUGS (ALT 637 FOR MEDICARE OP): Performed by: NURSE PRACTITIONER

## 2024-01-23 PROCEDURE — 2500000004 HC RX 250 GENERAL PHARMACY W/ HCPCS (ALT 636 FOR OP/ED): Performed by: NURSE PRACTITIONER

## 2024-01-23 RX ORDER — METOPROLOL TARTRATE 25 MG/1
37.5 TABLET, FILM COATED ORAL 2 TIMES DAILY
Status: DISCONTINUED | OUTPATIENT
Start: 2024-01-23 | End: 2024-01-24

## 2024-01-23 RX ORDER — FUROSEMIDE 10 MG/ML
20 INJECTION INTRAMUSCULAR; INTRAVENOUS ONCE
Status: COMPLETED | OUTPATIENT
Start: 2024-01-23 | End: 2024-01-23

## 2024-01-23 RX ORDER — OXYCODONE HYDROCHLORIDE 5 MG/1
5 TABLET ORAL EVERY 6 HOURS PRN
Status: DISCONTINUED | OUTPATIENT
Start: 2024-01-23 | End: 2024-01-24 | Stop reason: HOSPADM

## 2024-01-23 RX ORDER — IPRATROPIUM BROMIDE AND ALBUTEROL SULFATE 2.5; .5 MG/3ML; MG/3ML
3 SOLUTION RESPIRATORY (INHALATION) EVERY 2 HOUR PRN
Status: DISCONTINUED | OUTPATIENT
Start: 2024-01-23 | End: 2024-01-24 | Stop reason: HOSPADM

## 2024-01-23 RX ADMIN — METOPROLOL TARTRATE 37.5 MG: 25 TABLET, FILM COATED ORAL at 21:29

## 2024-01-23 RX ADMIN — GABAPENTIN 100 MG: 100 CAPSULE ORAL at 09:45

## 2024-01-23 RX ADMIN — GABAPENTIN 100 MG: 100 CAPSULE ORAL at 15:54

## 2024-01-23 RX ADMIN — Medication 10 MG: at 21:30

## 2024-01-23 RX ADMIN — Medication 1 TABLET: at 09:44

## 2024-01-23 RX ADMIN — ATORVASTATIN CALCIUM 80 MG: 80 TABLET, FILM COATED ORAL at 09:45

## 2024-01-23 RX ADMIN — ACETAMINOPHEN 975 MG: 325 TABLET ORAL at 18:29

## 2024-01-23 RX ADMIN — ESCITALOPRAM OXALATE 10 MG: 10 TABLET ORAL at 09:45

## 2024-01-23 RX ADMIN — FUROSEMIDE 20 MG: 10 INJECTION, SOLUTION INTRAMUSCULAR; INTRAVENOUS at 15:54

## 2024-01-23 RX ADMIN — PANTOPRAZOLE SODIUM 40 MG: 40 TABLET, DELAYED RELEASE ORAL at 06:36

## 2024-01-23 RX ADMIN — SENNOSIDES AND DOCUSATE SODIUM 2 TABLET: 8.6; 5 TABLET ORAL at 21:29

## 2024-01-23 RX ADMIN — ESCITALOPRAM OXALATE 10 MG: 10 TABLET ORAL at 21:30

## 2024-01-23 RX ADMIN — METOPROLOL TARTRATE 25 MG: 25 TABLET, FILM COATED ORAL at 09:45

## 2024-01-23 RX ADMIN — POLYETHYLENE GLYCOL 3350 17 G: 17 POWDER, FOR SOLUTION ORAL at 09:00

## 2024-01-23 RX ADMIN — PSYLLIUM HUSK 1 PACKET: 3.4 POWDER ORAL at 09:44

## 2024-01-23 RX ADMIN — MAGNESIUM OXIDE TAB 400 MG (241.3 MG ELEMENTAL MG) 400 MG: 400 (241.3 MG) TAB at 09:45

## 2024-01-23 RX ADMIN — APIXABAN 5 MG: 5 TABLET, FILM COATED ORAL at 09:45

## 2024-01-23 RX ADMIN — FUROSEMIDE 20 MG: 10 INJECTION, SOLUTION INTRAMUSCULAR; INTRAVENOUS at 09:44

## 2024-01-23 RX ADMIN — SENNOSIDES AND DOCUSATE SODIUM 2 TABLET: 8.6; 5 TABLET ORAL at 09:45

## 2024-01-23 RX ADMIN — ACETAMINOPHEN 975 MG: 325 TABLET ORAL at 06:35

## 2024-01-23 RX ADMIN — APIXABAN 5 MG: 5 TABLET, FILM COATED ORAL at 21:30

## 2024-01-23 RX ADMIN — ACETAMINOPHEN 975 MG: 325 TABLET ORAL at 00:55

## 2024-01-23 RX ADMIN — GABAPENTIN 100 MG: 100 CAPSULE ORAL at 21:30

## 2024-01-23 ASSESSMENT — COGNITIVE AND FUNCTIONAL STATUS - GENERAL
CLIMB 3 TO 5 STEPS WITH RAILING: A LITTLE
STANDING UP FROM CHAIR USING ARMS: A LITTLE
DRESSING REGULAR LOWER BODY CLOTHING: A LOT
DAILY ACTIVITIY SCORE: 18
MOVING FROM LYING ON BACK TO SITTING ON SIDE OF FLAT BED WITH BEDRAILS: A LITTLE
STANDING UP FROM CHAIR USING ARMS: A LITTLE
CLIMB 3 TO 5 STEPS WITH RAILING: A LITTLE
WALKING IN HOSPITAL ROOM: A LITTLE
MOBILITY SCORE: 18
DRESSING REGULAR UPPER BODY CLOTHING: A LITTLE
MOBILITY SCORE: 21
TURNING FROM BACK TO SIDE WHILE IN FLAT BAD: A LITTLE
TOILETING: A LITTLE
WALKING IN HOSPITAL ROOM: A LITTLE
MOVING TO AND FROM BED TO CHAIR: A LITTLE
HELP NEEDED FOR BATHING: A LOT

## 2024-01-23 ASSESSMENT — PAIN SCALES - GENERAL
PAINLEVEL_OUTOF10: 3
PAINLEVEL_OUTOF10: 0 - NO PAIN
PAINLEVEL_OUTOF10: 0 - NO PAIN
PAINLEVEL_OUTOF10: 2
PAINLEVEL_OUTOF10: 0 - NO PAIN
PAINLEVEL_OUTOF10: 3
PAINLEVEL_OUTOF10: 3

## 2024-01-23 ASSESSMENT — PAIN - FUNCTIONAL ASSESSMENT
PAIN_FUNCTIONAL_ASSESSMENT: 0-10

## 2024-01-23 ASSESSMENT — ACTIVITIES OF DAILY LIVING (ADL): HOME_MANAGEMENT_TIME_ENTRY: 12

## 2024-01-23 ASSESSMENT — PAIN DESCRIPTION - DESCRIPTORS: DESCRIPTORS: SORE

## 2024-01-23 NOTE — CARE PLAN
The patient's goals for the shift include      The clinical goals for the shift include patient will remain hemodyamically stable throughout shift with interventions    Patient was safe during shift. Uses call light appropriately. Walked in carias last night and this morning, tolerated well. Hemodynamically stable throughout the shift. Pain at acceptable level with interventions. Patient slept well throughout shift.       Problem: Pain - Adult  Goal: Verbalizes/displays adequate comfort level or baseline comfort level  Outcome: Progressing     Problem: Safety - Adult  Goal: Free from fall injury  Outcome: Progressing     Problem: Discharge Planning  Goal: Discharge to home or other facility with appropriate resources  Outcome: Progressing     Problem: Chronic Conditions and Co-morbidities  Goal: Patient's chronic conditions and co-morbidity symptoms are monitored and maintained or improved  Outcome: Progressing     Problem: Skin  Goal: Decreased wound size/increased tissue granulation at next dressing change  Outcome: Progressing  Goal: Participates in plan/prevention/treatment measures  Outcome: Progressing  Goal: Prevent/manage excess moisture  Outcome: Progressing  Goal: Prevent/minimize sheer/friction injuries  Outcome: Progressing  Goal: Promote/optimize nutrition  Outcome: Progressing  Goal: Promote skin healing  Outcome: Progressing     Problem: Pain  Goal: Turns in bed with improved pain control throughout the shift  Outcome: Progressing  Goal: Walks with improved pain control throughout the shift  Outcome: Progressing  Goal: Performs ADL's with improved pain control throughout shift  Outcome: Progressing  Goal: Participates in PT with improved pain control throughout the shift  Outcome: Progressing  Goal: Free from opioid side effects throughout the shift  Outcome: Progressing  Goal: Free from acute confusion related to pain meds throughout the shift  Outcome: Progressing     Problem: Fall/Injury  Goal: Not  fall by end of shift  Outcome: Progressing  Goal: Be free from injury by end of the shift  Outcome: Progressing  Goal: Verbalize understanding of personal risk factors for fall in the hospital  Outcome: Progressing  Goal: Verbalize understanding of risk factor reduction measures to prevent injury from fall in the home  Outcome: Progressing  Goal: Use assistive devices by end of the shift  Outcome: Progressing  Goal: Pace activities to prevent fatigue by end of the shift  Outcome: Progressing     Problem: DVT/VTE Prevention/Activity  Goal: No decrease in circulation/sensation  Outcome: Progressing  Goal: Prevent skin breakdown  Outcome: Progressing  Goal: Return to preop oxygenation status  Outcome: Progressing  Goal: Tolerates optimal activity  Outcome: Progressing  Goal: Increase self care and/or family involvement in 24 hrs.  Outcome: Progressing     Problem: Diet/fluid balance  Goal: Free from nausea/vomiting  Outcome: Progressing  Goal: Return in bowel function  Outcome: Progressing  Goal: Tolerates prescribed diet  Outcome: Progressing     Problem: Other goals  Goal: No change in neurological status  Outcome: Progressing  Goal: Stabilize vital signs (return to 10% of baseline)  Outcome: Progressing     Problem: Respiratory  Goal: Minimize anxiety/maximize coping throughout shift  Outcome: Progressing  Goal: Minimal/no exertional discomfort or dyspnea this shift  Outcome: Progressing  Goal: Tolerate pulmonary toileting this shift  Outcome: Progressing  Goal: Verbalize decreased shortness of breath this shift  Outcome: Progressing  Goal: Wean oxygen to maintain O2 saturation per order/standard this shift  Outcome: Progressing  Goal: Increase self care and/or family involvement in next 24 hours  Outcome: Progressing

## 2024-01-23 NOTE — PROGRESS NOTES
Subjective   Had difficulty sleeping due to incisional pain and coughing overnight. Currently on 2L NC. Passing flatulence and had 2 BMs today.     Objective   Physical Exam:   Constitutional: awake, alert  ENMT: mucous membranes moist, EOMI, conjunctivae clear  Head/Neck: normocephalic, atraumatic; supple, trachea midline  Respiratory/Thorax: diminished breath sounds but otherwise clear  Cardiovascular: RRR, no murmur appreciated  Gastrointestinal: soft, nondistended, non-tender, bowel sounds appreciated  Extremities: palpable peripheral pulses, no edema  Neurological: AO x3, no focal deficits  Psychological: pleasant, cooperative  Skin: warm and dry    Scheduled Medications:   acetaminophen, 975 mg, oral, q6h  apixaban, 5 mg, oral, q12h  atorvastatin, 80 mg, oral, Daily  calcium carbonate-vitamin D3, 1 tablet, oral, Daily  cholecalciferol, 5,000 Units, oral, Daily  escitalopram, 10 mg, oral, BID  [Held by provider] furosemide, 20 mg, intravenous, BID  gabapentin, 100 mg, oral, TID  magnesium oxide, 400 mg, oral, Daily  melatonin, 10 mg, oral, Nightly  metoprolol tartrate, 25 mg, oral, BID  pantoprazole, 40 mg, oral, Daily before breakfast  polyethylene glycol, 17 g, oral, BID  psyllium, 1 packet, oral, Daily  sennosides-docusate sodium, 2 tablet, oral, BID    Continuous Medications:   lactated Ringer's, 5 mL/hr, Last Rate: 5 mL/hr (01/21/24 0608)    PRN Medications:   PRN medications: albumin human, bisacodyl, dextrose **OR** glucagon, magnesium sulfate, magnesium sulfate, metoprolol, naloxone, ondansetron, oxyCODONE, oxyCODONE, oxygen, potassium chloride, potassium chloride    Assessment/Plan   This is a 73-year-old male, with history of heavily calcified bicuspid aortic valve, HTN, HLD, CAD with prior MI, hx CVA, and COPD, who initially presented to [facility] on 1/19/24 for staged AVR.    Neurological:  #Postoperative pain  #Hx CVA  - Analgesia: per CTS  - Avoid excess caths & drains  - Monitor for ICU  delirium    Cardiovascular:  #Calcified bicuspid AV s/p AVR on 1/19/24  #Postoperative vasoplegia and shock, resolved  #Afib with RVR, new  #CAD with prior MI  #Hx HTN  #Hx HLD  - Off pressors (epinephrine) since yesterday. Maintain MAP>65.  - Continue Atorvastatin. Hold home Metoprolol succinate.  - Diuresis per CTS once off pressors  - Per CTS, chest tubes removed yesterday  - Started Lopressor 12.5 BID  - 2 Lasix 20 doses given yesterday and today per CTS    Respiratory:  #Postoperative respiratory insufficiency  #Hx COPD  - Off supplemental O2 today, goal of SPO2>92%  - Encourage IS and Acapella use     Gastrointestinal:  - Diet: Regular  - PPX: Protonix  - No BMs yet. Will consider escalating if none today. On bowel regimen of scheduled Metamucil, Miralax, Radha-Colace.     Endocrine:  No hx DM.  - Goal -180s.    Renal:  - GEOVANNA 2/2 diuresis  - Diuresing with Lasix 20 per CTS (2x, given once yesterday and once today)  - Continue LR 5cc/hr  - Monitor UOP  - Monitor & replete electrolytes PRN    Hematological:  #Postoperative blood loss anemia  #Thrombocytopenia  #Leukocytosis, suspect reactive  - Trend CBC.  - Hold DVT PPX for now given thrombocytopenia    Infection Disease:  S/p Ancef    Skin/MSK:  No acute issues.    ICU Checklist:  Antimicrobials: -  Oxygen: 2L NC   Drips: LR 5cc/hr  Feeding/Fluids: Regular  Analgesia: Per CTS  Sedation: -   Thromboprophylaxis: SCDs  Ulcer prophylaxis: Protonix   Glycemic control: BGM  Bowel care: PRN  Indwelling catheters: Thorne  Lines: PIV  Restraints: -  Dispo: CICU   Code Status: FULL    This is a preliminary note written by the resident. Please wait for attending addendum for finalization of note and recommendations.    Valentino Marin DO  Internal Medicine PGY1

## 2024-01-23 NOTE — PROGRESS NOTES
Subjective   Had difficulty sleeping due to incisional pain and coughing overnight. Currently on 2L NC. Passing flatulence but no BM yet.    Objective   Physical Exam:   Constitutional: awake, alert  ENMT: mucous membranes moist, EOMI, conjunctivae clear  Head/Neck: normocephalic, atraumatic; supple, trachea midline  Respiratory/Thorax: diminished breath sounds in the left lower lung fields  Cardiovascular: RRR, no murmur appreciated  Gastrointestinal: soft, nondistended, non-tender, bowel sounds appreciated  Extremities: palpable peripheral pulses, +1/2 pitting edema in LE bilaterally  Neurological: AO x3, no focal deficits  Psychological: pleasant, cooperative  Skin: warm and dry    Scheduled Medications:   acetaminophen, 975 mg, oral, q6h  apixaban, 5 mg, oral, q12h  atorvastatin, 80 mg, oral, Daily  calcium carbonate-vitamin D3, 1 tablet, oral, Daily  cholecalciferol, 5,000 Units, oral, Daily  escitalopram, 10 mg, oral, BID  gabapentin, 100 mg, oral, TID  magnesium oxide, 400 mg, oral, Daily  melatonin, 10 mg, oral, Nightly  metoprolol tartrate, 37.5 mg, oral, BID  pantoprazole, 40 mg, oral, Daily before breakfast  polyethylene glycol, 17 g, oral, BID  psyllium, 1 packet, oral, Daily  sennosides-docusate sodium, 2 tablet, oral, BID    Continuous Medications:   lactated Ringer's, 5 mL/hr, Last Rate: 5 mL/hr (01/21/24 0608)    PRN Medications:   PRN medications: bisacodyl, dextrose **OR** glucagon, ipratropium-albuteroL, magnesium sulfate, magnesium sulfate, naloxone, ondansetron, oxyCODONE, oxygen, potassium chloride, potassium chloride    Assessment/Plan   This is a 73-year-old male, with history of heavily calcified bicuspid aortic valve, HTN, HLD, CAD with prior MI, hx CVA, and COPD, who initially presented to [facility] on 1/19/24 for staged AVR.    Neurological:  #Postoperative pain  #Hx CVA  - Analgesia: per CTS  - Avoid excess caths & drains  - Monitor for ICU delirium    Cardiovascular:  #Calcified bicuspid  AV s/p AVR on 1/19/24  #Postoperative vasoplegia and shock, resolved  #Afib with RVR, new  #CAD with prior MI  #Hx HTN  #Hx HLD  - Off pressors (epinephrine) since yesterday. Maintain MAP>65.  - Continue Atorvastatin, metoprolol 25 BID  - Diuresis per CTS once off pressors  - Per CTS, chest tubes removed yesterday  - Started Lopressor 12.5 BID  - Diuresis per CTS     Respiratory:  #Postoperative respiratory insufficiency  #Hx COPD  - Off supplemental O2 today, goal of SPO2>92%  - Encourage IS and Acapella use   - Today's CXR showing increase in left basilar atelectasis  - PRN albuterol if wheezing occurs    Gastrointestinal:  - Diet: Regular  - PPX: Protonix  - No BMs yet. Will consider escalating if none today. On bowel regimen of scheduled Metamucil, Miralax, Radha-Colace.     Endocrine:  No hx DM.  - Goal -180s.    Renal:  - GEOVANNA 2/2 diuresis  - Diuresing with Lasix 20 per CTS (2x, given once yesterday and once today)  - Continue LR 5cc/hr  - Monitor UOP  - Monitor & replete electrolytes PRN    Hematological:  #Postoperative blood loss anemia  #Thrombocytopenia  #Leukocytosis, suspect reactive  - Trend CBC.  - Eliquis started (plts >50)    Infection Disease:  S/p Ancef    Skin/MSK:  No acute issues.    ICU Checklist:  Antimicrobials: -  Oxygen: 2L NC   Drips: LR 5cc/hr  Feeding/Fluids: Regular  Analgesia: Per CTS  Sedation: -   Thromboprophylaxis: SCDs, Eliquis  Ulcer prophylaxis: Protonix   Glycemic control: BGM  Bowel care: PRN  Indwelling catheters: Thorne  Lines: PIV  Restraints: -  Dispo: CICU   Code Status: FULL    This is a preliminary note written by the resident. Please wait for attending addendum for finalization of note and recommendations.    Valentino Marin DO  Internal Medicine PGY1

## 2024-01-23 NOTE — PROGRESS NOTES
Physical Therapy    Physical Therapy Treatment    Patient Name: Jerel Drummond  MRN: [id number]  Today's Date: 1/23/2024  Time Calculation  Start Time: 1029  Stop Time: 1053  Time Calculation (min): 24 min       Assessment/Plan   PT Assessment  PT Assessment Results: Decreased strength, Decreased endurance, Impaired balance, Decreased mobility, Pain  Rehab Prognosis: Excellent  Evaluation/Treatment Tolerance: Patient limited by pain  Medical Staff Made Aware: Yes  End of Session Communication: Bedside nurse  Assessment Comment: 72 y/o male s/p AVR on 1/19/24 with tissue graft. reason for refferal to PT: post CT surgery who presents to PT with impaired mobility, balance and endurance who would benefit from continued in house PT and high intensity PT at d/c.  End of Session Patient Position: Up in chair, Alarm off, not on at start of session     PT Plan  PT Plan: Skilled PT  PT Frequency: 4 times per week  PT Discharge Recommendations: Low intensity level of continued care  PT Recommended Transfer Status: Assist x2  PT - OK to Discharge: Yes (when medically cleared)    Current Problem:  Patient Active Problem List   Diagnosis    Nonrheumatic aortic valve stenosis    Mitral stenosis    Callus    Chronic ischemic heart disease    Chronic obstructive lung disease (CMS/HCC)    Depressive disorder    Hypertension    Gastroesophageal reflux disease    Hammer toe    Herpes simplex without complication    History of malignant melanoma of skin    Insomnia    BILATERAL HANDS AND FEET    Onychomycosis of toenail    Pain in joint involving ankle and foot    Posttraumatic stress disorder    Calcific aortic stenosis    History of sleep apnea    Post-traumatic stress disorder, unspecified    Essential (primary) hypertension    Pre-op testing    Aortic valve stenosis, critical       General Visit Information:   PT  Visit  PT Received On: 01/23/24  General  Prior to Session Communication: Bedside nurse  Patient Position Received: Up in  chair, Alarm off, not on at start of session  General Comment: Pt sitting in chair upon entering, agreeable to PT.  Subjective     Precautions:  Precautions  Medical Precautions: Cardiac precautions  Post-Surgical Precautions: Move in the Tube    Vital Signs:  Vital Signs  Heart Rate:  (VSS throughout session)  Objective     Pain:  Pain Assessment  Pain Assessment:  (pt with no reports of pain during session)    Cognition:  Cognition  Overall Cognitive Status: Within Functional Limits    Treatments:  Therapeutic Exercise  Therapeutic Exercise Performed:  (sitting BLE: LAQs x20, hip flexion x 20)           Ambulation/Gait Training  Ambulation/Gait Training Performed:  (Pt ambulates >200ft with WW with no acute LOB, cues to keep walker at appropriate distance.)  Transfers  Transfer:  (sit to stand with SBA)  Stairs  Stairs:  (Pt ascends and descends 3 stairs with rail with reciprocals steps.)       Outcome Measures:  [facility] Basic Mobility  Turning from your back to your side while in a flat bed without using bedrails: A little  Moving from lying on your back to sitting on the side of a flat bed without using bedrails: A little  Moving to and from bed to chair (including a wheelchair): A little  Standing up from a chair using your arms (e.g. wheelchair or bedside chair): A little  To walk in hospital room: A little  Climbing 3-5 steps with railing: A little  Basic Mobility - Total Score: 18  Education Documentation  Precautions, taught by Aleida Osman PT at 1/23/2024  3:29 PM.  Learner: Patient  Readiness: Acceptance  Method: Explanation  Response: Verbalizes Understanding    Home Exercise Program, taught by Aleida Osman PT at 1/23/2024  3:29 PM.  Learner: Patient  Readiness: Acceptance  Method: Explanation  Response: Verbalizes Understanding    Mobility Training, taught by Aleida Osman PT at 1/23/2024  3:29 PM.  Learner: Patient  Readiness: Acceptance  Method: Explanation  Response: Verbalizes  Understanding    Precautions, taught by Aleida Osman, PT at 1/22/2024  4:29 PM.  Learner: Patient  Readiness: Acceptance  Method: Explanation  Response: Verbalizes Understanding    Home Exercise Program, taught by Aleida Osman, PT at 1/22/2024  4:29 PM.  Learner: Patient  Readiness: Acceptance  Method: Explanation  Response: Verbalizes Understanding    Mobility Training, taught by Aleida Osman, PT at 1/22/2024  4:29 PM.  Learner: Patient  Readiness: Acceptance  Method: Explanation  Response: Verbalizes Understanding    Education Comments  No comments found.        EDUCATION:     Encounter Problems       Encounter Problems (Active)       Mobility       STG - Patient will ambulate (Progressing)       Start:  01/20/24    Expected End:  01/26/24       >/= 300 feet with adaptive vital sign and oxygenation response and modified independence and LRAD         STG - Patient will ambulate up and down a curb/step (Progressing)       Start:  01/20/24    Expected End:  01/26/24       With SBA and LRAD              Pain       Pt will tolerate all PT with pain </= 3/10         Pain - Adult          Transfers       STG - Transfer from bed to chair (Progressing)       Start:  01/20/24    Expected End:  01/26/24       With modified independence and LRAD           STG - Patient will transfer sit to and from stand (Progressing)       Start:  01/20/24    Expected End:  01/26/24       With modified independence and LRAD           Goal 1 (Progressing)       Start:  01/20/24    Expected End:  01/26/24       Pt will perform bed mobility with log roll and SBA

## 2024-01-23 NOTE — PROGRESS NOTES
"CTICU Progress Note  Jerel Drummond/[id number]    Admit Date: 1/19/2024  Hospital Length of Stay: 4   ICU Length of Stay: 3d 18h   CT SURGEON: Dr. Eddy    SUBJECTIVE:   No acute overnight events.     MEDICATIONS  Infusions:  lactated Ringer's, Last Rate: 5 mL/hr (01/21/24 0608)      Scheduled:  acetaminophen, 975 mg, q6h  apixaban, 5 mg, q12h  atorvastatin, 80 mg, Daily  calcium carbonate-vitamin D3, 1 tablet, Daily  cholecalciferol, 5,000 Units, Daily  escitalopram, 10 mg, BID  [Held by provider] furosemide, 20 mg, BID  gabapentin, 100 mg, TID  magnesium oxide, 400 mg, Daily  melatonin, 10 mg, Nightly  metoprolol tartrate, 25 mg, BID  pantoprazole, 40 mg, Daily before breakfast  polyethylene glycol, 17 g, BID  psyllium, 1 packet, Daily  sennosides-docusate sodium, 2 tablet, BID      PRN:  albumin human, 12.5 g, PRN  bisacodyl, 10 mg, Daily PRN  dextrose, 25 g, q15 min PRN   Or  glucagon, 1 mg, q15 min PRN  magnesium sulfate, 2 g, q6h PRN  magnesium sulfate, 4 g, q6h PRN  metoprolol, 5 mg, q1h PRN  naloxone, 0.2 mg, q5 min PRN  ondansetron, 4 mg, q4h PRN  oxyCODONE, 10 mg, q6h PRN  oxyCODONE, 5 mg, q6h PRN  oxygen, , Continuous PRN - O2/gases  potassium chloride, 20 mEq, q1h PRN  potassium chloride, 40 mEq, q2h PRN        PHYSICAL EXAM:   Visit Vitals  /65   Pulse 93   Temp 37.7 °C (99.9 °F) (Temporal)   Resp 20   Ht 1.82 m (5' 11.65\")   Wt 111 kg (244 lb 7.8 oz)   SpO2 96%   BMI 33.48 kg/m²   Smoking Status Former   BSA 2.37 m²     Wt Readings from Last 5 Encounters:   01/23/24 111 kg (244 lb 7.8 oz)   01/16/24 108 kg (237 lb 7 oz)   11/08/23 105 kg (231 lb)   01/19/23 107 kg (235 lb 0.2 oz)     INTAKE/OUTPUT:  I/O last 3 completed shifts:  In: 620 (5.6 mL/kg) [P.O.:520; IV Piggyback:100]  Out: 3280 (29.6 mL/kg) [Urine:3280 (0.8 mL/kg/hr)]  Weight: 110.9 kg        LDA:  CVC 01/19/24 Single lumen Right Internal jugular (Active)   Placement Date/Time: 01/19/24 (c) 0820   Hand Hygiene Performed Prior to CVC " Insertion: Yes  Site Prep: Chlorhexidine   Site Prep Agent has Completely Dried Before Insertion: Yes  All 5 Sterile Barriers Used (Gloves, Gown, Cap, Mask, Large Sterile Radha...   Number of days: 1       Introducer 01/19/24 Internal jugular Right (Active)   Placement Date/Time: 01/19/24 (c) 0820   Hand Hygiene Completed: Yes  Location: Internal jugular  Orientation: Right  Placement Verified: Pressure tracing changes;Transesophageal echocardiogram   Number of days: 1       Arterial Line 01/19/24 Right Brachial (Active)   Placement Date/Time: 01/19/24 (c) 0755   Size: 20 G  Orientation: Right  Location: Brachial  Securement Method: Transparent dressing  Patient Tolerance: Tolerated well   Number of days: 1       Urethral Catheter Temperature probe 14 Fr. (Active)   Placement Date/Time: 01/19/24 0800   Hand Hygiene Completed: Yes  Catheter Type: Temperature probe  Tube Size (Fr.): 14 Fr.  Catheter Balloon Size: 10 mL  Urine Returned: Yes   Number of days: 1       Chest Tube 1 Mediastinal 28 Fr (Active)   Placement Date/Time: 01/19/24 1117   Hand Hygiene Completed: Yes  Tube Number: 1  Chest Tube Location: Mediastinal  Chest Tube Drain Tube Size (Fr): 28 Fr  Chest Tube Drainage System: Dry seal chest drain   Number of days: 1       Chest Tube 2 Left Pleural 28 Fr (Active)   Placement Date/Time: 01/19/24 1118   Hand Hygiene Completed: Yes  Tube Number: 2  Chest Tube Orientation: Left  Chest Tube Location: Pleural  Chest Tube Drain Tube Size (Fr): 28 Fr  Chest Tube Drainage System: Dry seal chest drain   Number of days: 1       Physical Exam:   PHYSICAL EXAM:  - GENERAL: No acute distress. Well-nourished.  - NEUROLOGIC: No focal neurological deficits. Cam negative.   - LUNGS: Diminished bases. No accessory muscle use. RA.  - CARDIOVASCULAR: Regular rate and rhythm.  Epicardial wires VVI@60.   - ABDOMEN: Soft, non-tender and non-distended.   - : Voiding per urinal.  - EXTREMITIES: No edema. Non-tender.?  - SKIN: No  rashes or lesions. Warm.  - PSYCHIATRIC: Calm and cooperative.     Images: Reviewed  Daily Risk Screen:  Parenteral medication  critically ill patient who need accurate urinary output measurements        Assessment/Plan   Jerel Drummond is a 73 y.o. male with a PMH significant for Severe Calcific Bicuspid Aortic Valve Stenosis, CAD (previous STEMI), HTN, HLD and COPD who presents to [facility] on 1/19/24 for a staged aortic valve replacement.     Now s/p AVR, left pericardial window and Ligation, ligament of Hang and part of the LLEAP trial with Dr. Eddy on 1/19.    Daiy Events    1/21/24: Patient weaned off of inotropic therapy and has been without signs of decreased perfusion. Plna to discontinue swan-alfie catheter. Starting on beta blocakde with PO metoprolol tartrate 12.5mg BID and IV furosemide 20mg BID.     - Current O2 Requirements: 2L NC     1/22/24: Hemodynamically stable, on RA. Increased metoprolol to 25mg BID. Starting Eliquis 5mg BID for AVR prophylaxis. Continuing to hold antiplatelet therapy with thrombocytopenia, likely 2/2 pump run and valve consumption. Holding diuresis with GEOVANNA.     1/23/24: No acute events.  On Room air.  Diuresising, hypervolemic on exam.     Plan:  NEURO:  PMH of insomnia. Acute post operative pain, relatively well controlled. Ambulating unit. Sleeping well.  -->  - Serial neuro and pain assessments    - Scheduled Tylenol   - PRN oxycodone  - PRN fentanyl for pain   - Continue scheduled magnesium and gabapentin  - PT, OOB to chair as tolerated  - Sleep/wake cycle hygiene  - Continue home lexapro   - Continue home melatonin 10 mg HS  - Hold home Ambien 5 mg     CV:  Patient has a history of CAD s/p PCI to RCA (1/2023), AVR, HTN, HLD, Now s/p Bio prosthetic AVR, left pericardial window and Ligation, ligament of Hang with Dr. Eddy on 1/19. Weaned off gtts 1/21. A/V epicardial wires set, SR 90s. Normotensive. -->  - Continuous EKG and  ABP monitoring  - Goal MAP 70-90  - Maintain epicardial wires  - On ticagrelor pre-op for PCI, however, it has been 1 year, so may be ok with just ASA (regardless, holding for now in setting of thrombocytopenia)  - Continue apixaban 5mg BID for valve prophylaxis, to continue for 3 months  - Continue atorvastatin 80  - Increase metoprolol 37.55 mg BID  - Complete ECHO for Post op AVR discharge      PULM:  No history of pulmonary disease.  Extubated POD 0. Chest tubes removed. On RA, IS > 1L. CXR stable left side atelectasis.  -->  - Daily CXR  - Wean FiO2 maintaining SpO2 >92%.   - IS q1h and OOB to chair  - 2 View CXR for Post op AVR discharge      GI:  PMH of GERD. Tolerating regular diet. Last BM 1/22.  -->  - Continue PPI  - Regular Diet  - Sennakot BID and miralax BID     :  No history of renal disease, baseline CRE 0.9. Creatinine stable post-op.  Last 24 hours net -200 mL. Hypervolemic in exam.  -->   - Goal - 1L -> IV Lasix 20 mg   - RFP daily  - Replete electrolytes per CTICU protocol     ENDO:  No endo history.  A1c: 5.7. Eugylcemic-->  - Maintain BG <180, monitor on daily RFP     HEME:  Acute blood loss anemia, stable. Thrombocytopenia likely 2/2 pump run and valve consumption  -->    - CBC daily  - Continue eliquis 5mg BID for valve prophylaxis  - Will need antiplatelet for CAD, holding in setting of thrombocytopenia  - SCDs for DVT prophylaxis.     ID:  T. Max 37.5, no current indications of infection. MRSA: negative. Completed rick-op ancef. -->  - Trend temp q4h       Skin:    - arrived to ICU from OR with preventative Mepilex dressings in place on sacrum and heels  - change preventative Mepilex weekly or more frequently as indicated (when moist/soiled)   - every shift skin assessment per nursing and weekly ICU skin rounds  - moisture barrier to be applied with rick care  - active skin problems addressed with nursing on daily rounds Pressure ulcer/skin tear/DTI      Proph:  SCDs  PPI  Eliquis     G:   Line  PIV    F: Family: Will update family when available.      A,B,C,D,E,F,G: reviewed    Restraints: Not indicated    Code status: Full Code      Dispo: CICU (Stepdown)    Above patient and plan discussed with cardiac surgeon, Dr. Surjit LOPEZ Head, APRN-CNP

## 2024-01-23 NOTE — PROGRESS NOTES
TCC Note: Attempted to meet with pt at bedside however, pt currently in ECHO. Will attempt again. Liana Gallagher, MSN, RN, TCC.

## 2024-01-23 NOTE — PROGRESS NOTES
Occupational Therapy    OT Treatment    Patient Name: Jerel Drummond  MRN: [id number]  Today's Date: 1/23/2024  Time Calculation  Start Time: 1029  Stop Time: 1053  Time Calculation (min): 24 min         Assessment:        Plan:  OT Frequency: 5 times per week  OT Discharge Recommendations: High intensity level of continued care       Subjective   Previous Visit Info:  OT Last Visit  OT Received On: 01/23/24  General:  General  Prior to Session Communication: Bedside nurse  Patient Position Received: Up in chair, Alarm off, not on at start of session  Precautions:  Precautions Comment: MITT, cardiac  Vital Signs:     Pain:  Pain Assessment  Pain Assessment:  (no pain complaints)         LE Dressing  LE Dressing:  (pt. able to thread pants with cues for sequencing, sba, then same to stand and pull over hips.  doffing pants standing with sba, then seated with mod assist to pull over feet, would benefit from reacher use.)       Functional Standing Tolerance:  Functional Standing Tolerance Comments: pt. able to tolerate static standing x 5 min/30 seconds, intermittent use of wh. walker for support, no sob/signs-symptoms of fatigue, pt. conversational throughout standing trial, tolerated well.  Bed Mobility/Transfers:      Transfers  Transfer:  (sit<> stand from recliner chair, sba)         Ambulation/Gait Training:  Ambulation/Gait Training  Ambulation/Gait Training Performed:  (mobility within room chair<> door via wh. walker, sba)     Therapy/Activity: Therapeutic Exercise  Therapeutic Exercise Performed:  (pt. able to complete 20 reps bicep curls, and 10 reps shoulder flexion within MITT precautions, tolerated well)      Outcome Measures:[facility] Daily Activity  Putting on and taking off regular lower body clothing: A lot  Bathing (including washing, rinsing, drying): A lot  Putting on and taking off regular upper body clothing: A little  Toileting, which includes using toilet, bedpan or urinal: A little  Taking care of  personal grooming such as brushing teeth: None  Eating Meals: None  Daily Activity - Total Score: 18        Education Documentation  Precautions, taught by Gladis Mcgowan OT at 1/23/2024  1:45 PM.  Learner: Patient  Readiness: Acceptance  Method: Explanation  Response: Verbalizes Understanding, Needs Reinforcement    Precautions, taught by Gladis Mcgowan OT at 1/22/2024  2:23 PM.  Learner: Patient  Readiness: Acceptance  Method: Explanation  Response: Verbalizes Understanding, Needs Reinforcement    Education Comments  No comments found.        OP EDUCATION:  Education  Education Provided:  (MITT, EC/WS)    Goals:  Encounter Problems       Encounter Problems (Active)       ADLs       Patient will perform UB and LB bathing  with modified independent level of assistance . (Progressing)       Start:  01/20/24    Expected End:  01/26/24            Patient with complete lower body dressing with modified independent level of assistance donning and doffing all LE clothes  with PRN adaptive equipment while edge of bed  (Progressing)       Start:  01/20/24    Expected End:  01/26/24            Patient will complete toileting including hygiene clothing management/hygiene with independent level of assistance and raised toilet seat and grab bars. (Progressing)       Start:  01/20/24    Expected End:  01/26/24               MOBILITY       Patient will perform Functional mobility mod  Household distances/Community Distances with independent level of assistance and least restrictive device in order to improve safety and functional mobility. (Progressing)       Start:  01/20/24    Expected End:  01/26/24

## 2024-01-24 ENCOUNTER — DOCUMENTATION (OUTPATIENT)
Dept: HOME HEALTH SERVICES | Facility: HOME HEALTH | Age: 74
End: 2024-01-24
Payer: MEDICARE

## 2024-01-24 ENCOUNTER — HOME HEALTH ADMISSION (OUTPATIENT)
Dept: HOME HEALTH SERVICES | Facility: HOME HEALTH | Age: 74
End: 2024-01-24
Payer: MEDICARE

## 2024-01-24 ENCOUNTER — APPOINTMENT (OUTPATIENT)
Dept: RADIOLOGY | Facility: HOSPITAL | Age: 74
DRG: 219 | End: 2024-01-24
Payer: MEDICARE

## 2024-01-24 ENCOUNTER — HOSPITAL ENCOUNTER (OUTPATIENT)
Dept: CARDIOLOGY | Facility: HOSPITAL | Age: 74
Discharge: HOME | End: 2024-01-24
Payer: MEDICARE

## 2024-01-24 VITALS
SYSTOLIC BLOOD PRESSURE: 145 MMHG | DIASTOLIC BLOOD PRESSURE: 73 MMHG | WEIGHT: 244.49 LBS | TEMPERATURE: 98.2 F | HEART RATE: 99 BPM | OXYGEN SATURATION: 98 % | HEIGHT: 72 IN | BODY MASS INDEX: 33.12 KG/M2 | RESPIRATION RATE: 18 BRPM

## 2024-01-24 DIAGNOSIS — I24.9 ACS (ACUTE CORONARY SYNDROME) (MULTI): ICD-10-CM

## 2024-01-24 PROBLEM — I35.0 NONRHEUMATIC AORTIC VALVE STENOSIS: Status: RESOLVED | Noted: 2023-11-06 | Resolved: 2024-01-24

## 2024-01-24 PROBLEM — I35.0 AORTIC VALVE STENOSIS, CRITICAL: Status: RESOLVED | Noted: 2024-01-19 | Resolved: 2024-01-24

## 2024-01-24 PROBLEM — Z01.818 PRE-OP TESTING: Status: RESOLVED | Noted: 2024-01-19 | Resolved: 2024-01-24

## 2024-01-24 LAB
ALBUMIN SERPL BCP-MCNC: 3.1 G/DL (ref 3.4–5)
ANION GAP SERPL CALC-SCNC: 10 MMOL/L (ref 10–20)
BUN SERPL-MCNC: 24 MG/DL (ref 6–23)
CALCIUM SERPL-MCNC: 8.4 MG/DL (ref 8.6–10.3)
CHLORIDE SERPL-SCNC: 102 MMOL/L (ref 98–107)
CO2 SERPL-SCNC: 31 MMOL/L (ref 21–32)
CREAT SERPL-MCNC: 0.97 MG/DL (ref 0.5–1.3)
EGFRCR SERPLBLD CKD-EPI 2021: 81 ML/MIN/1.73M*2
ERYTHROCYTE [DISTWIDTH] IN BLOOD BY AUTOMATED COUNT: 14.5 % (ref 11.5–14.5)
GLUCOSE SERPL-MCNC: 117 MG/DL (ref 74–99)
HCT VFR BLD AUTO: 29.1 % (ref 41–52)
HGB BLD-MCNC: 9.7 G/DL (ref 13.5–17.5)
MAGNESIUM SERPL-MCNC: 1.83 MG/DL (ref 1.6–2.4)
MCH RBC QN AUTO: 31 PG (ref 26–34)
MCHC RBC AUTO-ENTMCNC: 33.3 G/DL (ref 32–36)
MCV RBC AUTO: 93 FL (ref 80–100)
NRBC BLD-RTO: 0 /100 WBCS (ref 0–0)
PHOSPHATE SERPL-MCNC: 3.4 MG/DL (ref 2.5–4.9)
PLATELET # BLD AUTO: 114 X10*3/UL (ref 150–450)
POTASSIUM SERPL-SCNC: 4.9 MMOL/L (ref 3.5–5.3)
RBC # BLD AUTO: 3.13 X10*6/UL (ref 4.5–5.9)
SODIUM SERPL-SCNC: 138 MMOL/L (ref 136–145)
WBC # BLD AUTO: 8.4 X10*3/UL (ref 4.4–11.3)

## 2024-01-24 PROCEDURE — 83735 ASSAY OF MAGNESIUM: CPT

## 2024-01-24 PROCEDURE — 93010 ELECTROCARDIOGRAM REPORT: CPT | Performed by: INTERNAL MEDICINE

## 2024-01-24 PROCEDURE — 2500000001 HC RX 250 WO HCPCS SELF ADMINISTERED DRUGS (ALT 637 FOR MEDICARE OP)

## 2024-01-24 PROCEDURE — 71045 X-RAY EXAM CHEST 1 VIEW: CPT | Performed by: RADIOLOGY

## 2024-01-24 PROCEDURE — 93005 ELECTROCARDIOGRAM TRACING: CPT

## 2024-01-24 PROCEDURE — 2500000004 HC RX 250 GENERAL PHARMACY W/ HCPCS (ALT 636 FOR OP/ED)

## 2024-01-24 PROCEDURE — 97530 THERAPEUTIC ACTIVITIES: CPT | Mod: GP

## 2024-01-24 PROCEDURE — 71045 X-RAY EXAM CHEST 1 VIEW: CPT

## 2024-01-24 PROCEDURE — 85027 COMPLETE CBC AUTOMATED: CPT

## 2024-01-24 PROCEDURE — 99232 SBSQ HOSP IP/OBS MODERATE 35: CPT

## 2024-01-24 PROCEDURE — 2500000001 HC RX 250 WO HCPCS SELF ADMINISTERED DRUGS (ALT 637 FOR MEDICARE OP): Performed by: ANESTHESIOLOGY

## 2024-01-24 PROCEDURE — 97535 SELF CARE MNGMENT TRAINING: CPT | Mod: GO

## 2024-01-24 PROCEDURE — 80069 RENAL FUNCTION PANEL: CPT

## 2024-01-24 PROCEDURE — 2500000004 HC RX 250 GENERAL PHARMACY W/ HCPCS (ALT 636 FOR OP/ED): Performed by: NURSE PRACTITIONER

## 2024-01-24 PROCEDURE — 36415 COLL VENOUS BLD VENIPUNCTURE: CPT

## 2024-01-24 PROCEDURE — 2500000001 HC RX 250 WO HCPCS SELF ADMINISTERED DRUGS (ALT 637 FOR MEDICARE OP): Performed by: NURSE PRACTITIONER

## 2024-01-24 RX ORDER — FUROSEMIDE 20 MG/1
10 TABLET ORAL DAILY
Qty: 30 TABLET | Refills: 0 | Status: SHIPPED | OUTPATIENT
Start: 2024-01-25

## 2024-01-24 RX ORDER — METOPROLOL TARTRATE 50 MG/1
50 TABLET ORAL 2 TIMES DAILY
Status: DISCONTINUED | OUTPATIENT
Start: 2024-01-24 | End: 2024-01-24

## 2024-01-24 RX ORDER — METOPROLOL SUCCINATE 25 MG/1
75 TABLET, EXTENDED RELEASE ORAL DAILY
Qty: 30 TABLET | Refills: 0 | Status: SHIPPED | OUTPATIENT
Start: 2024-01-25

## 2024-01-24 RX ORDER — METOPROLOL SUCCINATE 50 MG/1
100 TABLET, EXTENDED RELEASE ORAL DAILY
Status: DISCONTINUED | OUTPATIENT
Start: 2024-01-24 | End: 2024-01-24

## 2024-01-24 RX ORDER — BISACODYL 5 MG
10 TABLET, DELAYED RELEASE (ENTERIC COATED) ORAL DAILY PRN
Qty: 30 TABLET | Refills: 0
Start: 2024-01-24

## 2024-01-24 RX ORDER — FUROSEMIDE 10 MG/ML
20 INJECTION INTRAMUSCULAR; INTRAVENOUS ONCE
Status: COMPLETED | OUTPATIENT
Start: 2024-01-24 | End: 2024-01-24

## 2024-01-24 RX ORDER — OXYCODONE HYDROCHLORIDE 5 MG/1
5 TABLET ORAL EVERY 8 HOURS PRN
Qty: 15 TABLET | Refills: 0 | Status: SHIPPED | OUTPATIENT
Start: 2024-01-24

## 2024-01-24 RX ORDER — ACETAMINOPHEN 325 MG/1
975 TABLET ORAL EVERY 6 HOURS PRN
Start: 2024-01-24

## 2024-01-24 RX ADMIN — POLYETHYLENE GLYCOL 3350 17 G: 17 POWDER, FOR SOLUTION ORAL at 08:39

## 2024-01-24 RX ADMIN — Medication 1 TABLET: at 08:55

## 2024-01-24 RX ADMIN — ATORVASTATIN CALCIUM 80 MG: 80 TABLET, FILM COATED ORAL at 08:39

## 2024-01-24 RX ADMIN — ACETAMINOPHEN 975 MG: 325 TABLET ORAL at 00:34

## 2024-01-24 RX ADMIN — PSYLLIUM HUSK 1 PACKET: 3.4 POWDER ORAL at 08:39

## 2024-01-24 RX ADMIN — METOPROLOL SUCCINATE 75 MG: 50 TABLET, EXTENDED RELEASE ORAL at 09:32

## 2024-01-24 RX ADMIN — MAGNESIUM OXIDE TAB 400 MG (241.3 MG ELEMENTAL MG) 400 MG: 400 (241.3 MG) TAB at 08:38

## 2024-01-24 RX ADMIN — MAGNESIUM SULFATE HEPTAHYDRATE 2 G: 40 INJECTION, SOLUTION INTRAVENOUS at 06:50

## 2024-01-24 RX ADMIN — FUROSEMIDE 20 MG: 10 INJECTION, SOLUTION INTRAMUSCULAR; INTRAVENOUS at 08:42

## 2024-01-24 RX ADMIN — PANTOPRAZOLE SODIUM 40 MG: 40 TABLET, DELAYED RELEASE ORAL at 06:50

## 2024-01-24 RX ADMIN — CHOLECALCIFEROL TAB 25 MCG (1000 UNIT) 5000 UNITS: 25 TAB at 08:46

## 2024-01-24 RX ADMIN — TICAGRELOR 90 MG: 90 TABLET ORAL at 11:02

## 2024-01-24 RX ADMIN — GABAPENTIN 100 MG: 100 CAPSULE ORAL at 08:39

## 2024-01-24 RX ADMIN — APIXABAN 5 MG: 5 TABLET, FILM COATED ORAL at 08:39

## 2024-01-24 RX ADMIN — SENNOSIDES AND DOCUSATE SODIUM 2 TABLET: 8.6; 5 TABLET ORAL at 08:39

## 2024-01-24 RX ADMIN — ACETAMINOPHEN 975 MG: 325 TABLET ORAL at 06:50

## 2024-01-24 RX ADMIN — ESCITALOPRAM OXALATE 10 MG: 10 TABLET ORAL at 08:39

## 2024-01-24 ASSESSMENT — COGNITIVE AND FUNCTIONAL STATUS - GENERAL
DAILY ACTIVITIY SCORE: 19
MOVING FROM LYING ON BACK TO SITTING ON SIDE OF FLAT BED WITH BEDRAILS: A LITTLE
DRESSING REGULAR UPPER BODY CLOTHING: A LITTLE
HELP NEEDED FOR BATHING: A LOT
CLIMB 3 TO 5 STEPS WITH RAILING: A LITTLE
MOVING TO AND FROM BED TO CHAIR: A LITTLE
CLIMB 3 TO 5 STEPS WITH RAILING: A LITTLE
WALKING IN HOSPITAL ROOM: A LITTLE
HELP NEEDED FOR BATHING: A LITTLE
DAILY ACTIVITIY SCORE: 20
DRESSING REGULAR LOWER BODY CLOTHING: A LITTLE
DRESSING REGULAR UPPER BODY CLOTHING: A LITTLE
STANDING UP FROM CHAIR USING ARMS: A LITTLE
DRESSING REGULAR LOWER BODY CLOTHING: A LITTLE
MOBILITY SCORE: 23
TOILETING: A LITTLE
TOILETING: A LITTLE
MOBILITY SCORE: 18
TURNING FROM BACK TO SIDE WHILE IN FLAT BAD: A LITTLE

## 2024-01-24 ASSESSMENT — PAIN SCALES - GENERAL
PAINLEVEL_OUTOF10: 0 - NO PAIN

## 2024-01-24 ASSESSMENT — PAIN - FUNCTIONAL ASSESSMENT
PAIN_FUNCTIONAL_ASSESSMENT: 0-10

## 2024-01-24 ASSESSMENT — ACTIVITIES OF DAILY LIVING (ADL): HOME_MANAGEMENT_TIME_ENTRY: 11

## 2024-01-24 NOTE — PROGRESS NOTES
This TCC met with patient at bedside, introduced self and explained role.  Demographic information and insurance verified.  Patient is from home with spouse.  Independent, driving PTA.  Denies SW needs at this time.  Patient plans to return home at discharge, with [facility].  Spouse indicated AOC is [facility].  [place] of choice explained, [facility] list given to patient's spouse for additional choices in case Critical access hospital is unable to accept patient.  Patient spouse will provide transportation home at discharge.  TCC will continue to follow care progression for discharge planning needs.     01/24/24 1108   Discharge Planning   Living Arrangements Spouse/significant other   Support Systems Spouse/significant other;Children   Assistance Needed Independent, driving PTA   Type of Residence Private residence   Number of Stairs to Enter Residence 1   Number of Stairs Within Residence 0   Do you have animals or pets at home? No   Home or Post Acute Services In home services   Type of Home Care Services Home nursing visits;Home OT;Home PT   Patient expects to be discharged to: Home   Does the patient need discharge transport arranged? No     11:30 Addendum  Referrals sent to Critical access hospital and [facility].  Olimpia Wick RN BSN, ED-TCC

## 2024-01-24 NOTE — DISCHARGE SUMMARY
Discharge Diagnosis  Nonrheumatic aortic valve stenosis    Issues Requiring Follow-Up  Post op visit for AVR.  Home healthcare for PT/OT.     Test Results Pending At Discharge  Pending Labs       Order Current Status    Surgical Pathology Exam In process            Hospital Course   Jerel Drummond is a 73 y.o. male with a PMH significant for Severe Calcific Bicuspid Aortic Valve Stenosis, CAD (previous STEMI), HTN, HLD and COPD who presents to [facility] on 1/19/24 for a staged aortic valve replacement.      Now s/p AVR, left pericardial window and Ligation, ligament of Hang and part of the LLEAP trial with Dr. Eddy on 1/19.     Daily Events     1/21/24: Patient weaned off of inotropic therapy and has been without signs of decreased perfusion. Plna to discontinue swan-alfie catheter. Starting on beta blocakde with PO metoprolol tartrate 12.5mg BID and IV furosemide 20mg BID.      - Current O2 Requirements: 2L NC      1/22/24: Hemodynamically stable, on RA. Increased metoprolol to 25mg BID. Starting Eliquis 5mg BID for AVR prophylaxis. Continuing to hold antiplatelet therapy with thrombocytopenia, likely 2/2 pump run and valve consumption. Holding diuresis with GEOVANNA.      1/23/24: No acute events.  On Room air.  Diuresising, hypervolemic on exam.     1/24/24: No acute events.  Room air.  Ambulating.  Continue diuresis.     Complete TTE: completed 1/23/2024  2 View CXR: Completed 1/23/2024    Pertinent Physical Exam At Time of Discharge  PHYSICAL EXAM:  - GENERAL: No acute distress. Well-nourished.  - NEUROLOGIC: No focal neurological deficits. OOB in chair. AOx3  - LUNGS: Diminished bases. No accessory muscle use. Room air.   - CARDIOVASCULAR: Regular rate and rhythm.  SR 80s. +1 Edema in LE.   - ABDOMEN: Soft, non-tender and non-distended.   - : Voiding  - EXTREMITIES: No edema. Non-tender.?  - SKIN: No rashes or lesions. Warm.  - PSYCHIATRIC: Calm and cooperative.       Home  Medications     Medication List      START taking these medications     acetaminophen 325 mg tablet; Commonly known as: Tylenol; Take 3 tablets   (975 mg) by mouth every 6 hours if needed for mild pain (1 - 3).   apixaban 5 mg tablet; Commonly known as: Eliquis; Take 1 tablet (5 mg)   by mouth every 12 hours.   bisacodyl 5 mg EC tablet; Commonly known as: Dulcolax; Take 2 tablets   (10 mg) by mouth once daily as needed for constipation. Do not crush,   chew, or split.   furosemide 20 mg tablet; Commonly known as: Lasix; Take 0.5 tablets (10   mg) by mouth once daily. Do not start before January 25, 2024.; Start   taking on: January 25, 2024   oxyCODONE 5 mg immediate release tablet; Commonly known as: Roxicodone;   Take 1 tablet (5 mg) by mouth every 8 hours if needed for moderate pain (4   - 6).     CHANGE how you take these medications     metoprolol succinate XL 25 mg 24 hr tablet; Commonly known as:   Toprol-XL; Take 3 tablets (75 mg) by mouth once daily. Do not crush or   chew. Do not start before January 25, 2024.; Start taking on: January 25, 2024; What changed: how much to take, additional instructions   ticagrelor 90 mg tablet; Commonly known as: Brilinta; Take 1 tablet (90   mg) by mouth 2 times a day for 730 doses.; What changed: when to take   this, additional instructions     CONTINUE taking these medications     Ambien 5 mg tablet; Generic drug: zolpidem   atorvastatin 80 mg tablet; Commonly known as: Lipitor   calcium carbonate-vitamin D3 600 mg-5 mcg (200 unit) tablet   cetirizine 10 mg capsule; Commonly known as: ZYRTEC   cyanocobalamin 500 mcg tablet; Commonly known as: Vitamin B-12   escitalopram 10 mg tablet; Commonly known as: Lexapro   melatonin 10 mg capsule   omeprazole 40 mg DR capsule; Commonly known as: PriLOSEC   Vitamin D3 5,000 Units tablet; Generic drug: cholecalciferol       Outpatient Follow-Up  Future Appointments   Date Time Provider Department Center   2/8/2024 12:30 PM PAR  [id number] [facility] NURSE [id number] [place]   3/4/2024  2:00 PM Jakub Eddy MD [id number] [place]       Marisela LOPEZ Head, APRN-CNP

## 2024-01-24 NOTE — SIGNIFICANT EVENT
Epicardial Pacing Wires Cut  Atrial and ventricular wires cut at skin level due to therapeutic INR.  Patient instructed to notify radiology of retained epicardial wires prior to any MRI procedure, and to notify Dr Eddy of any visible wires or s/s infection.

## 2024-01-24 NOTE — CARE PLAN
The patient's goals for the shift include      The clinical goals for the shift include Patient will get rest throughout shift.      Problem: Pain - Adult  Goal: Verbalizes/displays adequate comfort level or baseline comfort level  Outcome: Progressing     Problem: Safety - Adult  Goal: Free from fall injury  Outcome: Progressing     Problem: Discharge Planning  Goal: Discharge to home or other facility with appropriate resources  Outcome: Progressing     Problem: Chronic Conditions and Co-morbidities  Goal: Patient's chronic conditions and co-morbidity symptoms are monitored and maintained or improved  Outcome: Progressing     Problem: Skin  Goal: Decreased wound size/increased tissue granulation at next dressing change  Outcome: Progressing  Goal: Participates in plan/prevention/treatment measures  Outcome: Progressing  Goal: Prevent/manage excess moisture  Outcome: Progressing  Goal: Prevent/minimize sheer/friction injuries  Outcome: Progressing  Goal: Promote/optimize nutrition  Outcome: Progressing  Goal: Promote skin healing  Outcome: Progressing     Problem: Pain  Goal: Turns in bed with improved pain control throughout the shift  Outcome: Progressing  Goal: Walks with improved pain control throughout the shift  Outcome: Progressing  Goal: Performs ADL's with improved pain control throughout shift  Outcome: Progressing  Goal: Participates in PT with improved pain control throughout the shift  Outcome: Progressing  Goal: Free from opioid side effects throughout the shift  Outcome: Progressing  Goal: Free from acute confusion related to pain meds throughout the shift  Outcome: Progressing     Problem: Fall/Injury  Goal: Not fall by end of shift  Outcome: Progressing  Goal: Be free from injury by end of the shift  Outcome: Progressing  Goal: Verbalize understanding of personal risk factors for fall in the hospital  Outcome: Progressing  Goal: Verbalize understanding of risk factor reduction measures to prevent  injury from fall in the home  Outcome: Progressing  Goal: Use assistive devices by end of the shift  Outcome: Progressing  Goal: Pace activities to prevent fatigue by end of the shift  Outcome: Progressing     Problem: DVT/VTE Prevention/Activity  Goal: No decrease in circulation/sensation  Outcome: Progressing  Goal: Prevent skin breakdown  Outcome: Progressing  Goal: Return to preop oxygenation status  Outcome: Progressing  Goal: Tolerates optimal activity  Outcome: Progressing  Goal: Increase self care and/or family involvement in 24 hrs.  Outcome: Progressing     Problem: Diet/fluid balance  Goal: Free from nausea/vomiting  Outcome: Progressing  Goal: Return in bowel function  Outcome: Progressing  Goal: Tolerates prescribed diet  Outcome: Progressing     Problem: Other goals  Goal: No change in neurological status  Outcome: Progressing  Goal: Stabilize vital signs (return to 10% of baseline)  Outcome: Progressing     Problem: Respiratory  Goal: Minimize anxiety/maximize coping throughout shift  Outcome: Progressing  Goal: Minimal/no exertional discomfort or dyspnea this shift  Outcome: Progressing  Goal: Tolerate pulmonary toileting this shift  Outcome: Progressing  Goal: Verbalize decreased shortness of breath this shift  Outcome: Progressing  Goal: Wean oxygen to maintain O2 saturation per order/standard this shift  Outcome: Progressing  Goal: Increase self care and/or family involvement in next 24 hours  Outcome: Progressing

## 2024-01-24 NOTE — PROGRESS NOTES
Occupational Therapy    OT Treatment    Patient Name: Jerel Drummond  MRN: [id number]  Today's Date: 1/24/2024  Time Calculation  Start Time: 1049  Stop Time: 1100  Time Calculation (min): 11 min         Assessment:  End of Session Communication: Bedside nurse  End of Session Patient Position: Up in chair     Plan:  OT Frequency: 5 times per week  OT Discharge Recommendations: High intensity level of continued care       Subjective   Previous Visit Info:  OT Last Visit  OT Received On: 01/24/24  General:  General  Prior to Session Communication: Bedside nurse  Patient Position Received: Bed, 4 rail up, Alarm off, not on at start of session  General Comment: tx split between 2 sessions 1049-11 and [id number]  Precautions:  Precautions Comment: MITT, cardiac  Vital Signs:  Vital Signs  Heart Rate:  (VSS)  Pain:  Pain Assessment  Pain Assessment:  (pt. reports 8/10 incisional pain with activity, declines need for pain medicine)       UE Bathing  UE Bathing Comments: set up for front, assist for back    LE Bathing  LE Bathing Comments: while seated, pt. able to bathe rick area and BLE's, declined to bathe backside at this time    UE Dressing  UE Dressing Comments: set up to don/dof gown and assist for telemetry monitor            Functional Standing Tolerance:  Functional Standing Tolerance Comments: pt. able to tolerate 4 minutes static standing at sink to brush teeth, supervised  Bed Mobility/Transfers: Bed Mobility  Bed Mobility:  (supervision supine to sit)      Ambulation/Gait Training:  Ambulation/Gait Training  Ambulation/Gait Training Performed:  (mobility bed>sink>chair without device completed with supervision)    Pt. Demonstrated all tasks without sob, minimal c/o fatigue, seated rest periods between each task.        Outcome Measures: [facility] Daily Activity  Putting on and taking off regular lower body clothing: A little  Bathing (including washing, rinsing, drying): A lot  Putting on and taking off regular upper  body clothing: A little  Toileting, which includes using toilet, bedpan or urinal: A little  Taking care of personal grooming such as brushing teeth: None  Eating Meals: None  Daily Activity - Total Score: 19                Education Documentation  Precautions, taught by Gladis Mcgowan OT at 1/24/2024  1:18 PM.  Learner: Significant Other, Patient  Readiness: Acceptance  Method: Explanation  Response: Verbalizes Understanding    Precautions, taught by Gladis Mcgowan OT at 1/23/2024  1:45 PM.  Learner: Patient  Readiness: Acceptance  Method: Explanation  Response: Verbalizes Understanding, Needs Reinforcement    Education Comments  No comments found.        OP EDUCATION:  Education  Education Provided:  (EC/WS, MITT precautions as related to adl tasks)    Goals:  Encounter Problems       Encounter Problems (Active)       ADLs       Patient will perform UB and LB bathing  with modified independent level of assistance . (Progressing)       Start:  01/20/24    Expected End:  01/26/24            Patient with complete lower body dressing with modified independent level of assistance donning and doffing all LE clothes  with PRN adaptive equipment while edge of bed  (Progressing)       Start:  01/20/24    Expected End:  01/26/24            Patient will complete toileting including hygiene clothing management/hygiene with independent level of assistance and raised toilet seat and grab bars. (Progressing)       Start:  01/20/24    Expected End:  01/26/24               MOBILITY       Patient will perform Functional mobility mod  Household distances/Community Distances with independent level of assistance and least restrictive device in order to improve safety and functional mobility. (Progressing)       Start:  01/20/24    Expected End:  01/26/24               Mobility       STG - Patient will ambulate (Progressing)       Start:  01/20/24    Expected End:  01/26/24       >/= 300 feet with adaptive vital sign and oxygenation  response and modified independence and LRAD         STG - Patient will ambulate up and down a curb/step (Progressing)       Start:  01/20/24    Expected End:  01/26/24       With SBA and LRAD              TRANSFERS       Patient will perform bed mobility independent level of assistance and bed rails in order to improve safety and independence with mobility (Progressing)       Start:  01/20/24    Expected End:  01/26/24               Transfers       STG - Transfer from bed to chair (Progressing)       Start:  01/20/24    Expected End:  01/26/24       With modified independence and LRAD           STG - Patient will transfer sit to and from stand (Progressing)       Start:  01/20/24    Expected End:  01/26/24       With modified independence and LRAD           Goal 1 (Progressing)       Start:  01/20/24    Expected End:  01/26/24       Pt will perform bed mobility with log roll and SBA

## 2024-01-24 NOTE — PROGRESS NOTES
Physical Therapy    Physical Therapy Treatment    Patient Name: Jerel Drummond  MRN: [id number]  Today's Date: 1/24/2024  Time Calculation  Start Time: 1055  Stop Time: 1115  Time Calculation (min): 20 min       Assessment/Plan   PT Assessment  PT Assessment Results: Decreased strength, Decreased endurance, Impaired balance, Decreased mobility, Pain  Rehab Prognosis: Excellent  Evaluation/Treatment Tolerance: Patient limited by pain  Medical Staff Made Aware: Yes  End of Session Communication: Bedside nurse  Assessment Comment: 74 y/o male s/p AVR on 1/19/24 with tissue graft. reason for refferal to PT: post CT surgery who presents to PT with impaired mobility, balance and endurance who would benefit from continued in house PT and high intensity PT at d/c.  End of Session Patient Position:  (in room with OT present)     PT Plan  PT Plan: Skilled PT  PT Frequency: 4 times per week  PT Discharge Recommendations: Low intensity level of continued care  PT Recommended Transfer Status: Assist x2  PT - OK to Discharge: Yes (when medically cleared)    Current Problem:  Patient Active Problem List   Diagnosis    Mitral stenosis    Callus    Chronic ischemic heart disease    Chronic obstructive lung disease (CMS/HCC)    Depressive disorder    Hypertension    Gastroesophageal reflux disease    Hammer toe    Herpes simplex without complication    History of malignant melanoma of skin    Insomnia    BILATERAL HANDS AND FEET    Onychomycosis of toenail    Pain in joint involving ankle and foot    Posttraumatic stress disorder    Calcific aortic stenosis    History of sleep apnea    Post-traumatic stress disorder, unspecified    Essential (primary) hypertension       General Visit Information:   PT  Visit  PT Received On: 01/24/24  General  Prior to Session Communication: Bedside nurse  Patient Position Received: Bed, 4 rail up, Alarm off, not on at start of session  General Comment: Pt agreeable to PT  Subjective      Precautions:  Precautions  Medical Precautions: Cardiac precautions  Post-Surgical Precautions: Move in the Tube    Vital Signs:  Vital Signs  Heart Rate:  (HR 90s-100s with ambulation)  Objective     Pain:  Pain Assessment  Pain Assessment:  (8/10 incision pain)    Cognition:  Cognition  Overall Cognitive Status: Within Functional Limits      Treatments:  Therapeutic Exercise  Therapeutic Exercise Performed:  (sitting BLE: LAQs x20, hip flexion x 20)  Therapeutic Activity  Therapeutic Activity Performed:  (Pt completes 6 minute walk test this date with WW; 560ft with SBA, pt takes two standing rest breaks today.)        Ambulation/Gait Training  Ambulation/Gait Training Performed:  (560ft with SBA with WW)  Transfers  Transfer:  (sit to stand with supervision)  Stairs  Stairs:  (Pt ascends and descends 3 stairs with rail with reciprocals steps.)       Outcome Measures:  [facility] Basic Mobility  Turning from your back to your side while in a flat bed without using bedrails: A little  Moving from lying on your back to sitting on the side of a flat bed without using bedrails: A little  Moving to and from bed to chair (including a wheelchair): A little  Standing up from a chair using your arms (e.g. wheelchair or bedside chair): A little  To walk in hospital room: A little  Climbing 3-5 steps with railing: A little  Basic Mobility - Total Score: 18  Education Documentation  Precautions, taught by Aleida Osman PT at 1/24/2024  1:45 PM.  Learner: Patient  Readiness: Acceptance  Method: Explanation  Response: Verbalizes Understanding    Home Exercise Program, taught by Aleida Osman PT at 1/24/2024  1:45 PM.  Learner: Patient  Readiness: Acceptance  Method: Explanation  Response: Verbalizes Understanding    Mobility Training, taught by Aleida Osman PT at 1/24/2024  1:45 PM.  Learner: Patient  Readiness: Acceptance  Method: Explanation  Response: Verbalizes Understanding    Precautions, taught by Aleida SALAZAR  Jacqui PT at 1/23/2024  3:29 PM.  Learner: Patient  Readiness: Acceptance  Method: Explanation  Response: Verbalizes Understanding    Home Exercise Program, taught by Aleida Osman PT at 1/23/2024  3:29 PM.  Learner: Patient  Readiness: Acceptance  Method: Explanation  Response: Verbalizes Understanding    Mobility Training, taught by Aleida Osman PT at 1/23/2024  3:29 PM.  Learner: Patient  Readiness: Acceptance  Method: Explanation  Response: Verbalizes Understanding    Education Comments  No comments found.        EDUCATION:     Encounter Problems       Encounter Problems (Active)       Mobility       STG - Patient will ambulate (Progressing)       Start:  01/20/24    Expected End:  01/26/24       >/= 300 feet with adaptive vital sign and oxygenation response and modified independence and LRAD         STG - Patient will ambulate up and down a curb/step (Progressing)       Start:  01/20/24    Expected End:  01/26/24       With SBA and LRAD              Pain       Pt will tolerate all PT with pain </= 3/10         Pain - Adult          Transfers       STG - Transfer from bed to chair (Progressing)       Start:  01/20/24    Expected End:  01/26/24       With modified independence and LRAD           STG - Patient will transfer sit to and from stand (Progressing)       Start:  01/20/24    Expected End:  01/26/24       With modified independence and LRAD           Goal 1 (Progressing)       Start:  01/20/24    Expected End:  01/26/24       Pt will perform bed mobility with log roll and SBA

## 2024-01-24 NOTE — DOCUMENTATION CLARIFICATION NOTE
"    PATIENT:               VIRGIL WALSH  ACCT #:                  [id number]  MRN:                       [id number]  :                       1950  ADMIT DATE:       2024 6:32 AM  DISCH DATE:        2024 2:30 PM  RESPONDING PROVIDER #:        [id number]          PROVIDER RESPONSE TEXT:    Vasoplegia, post operative shock are confirmed    CDI QUERY TEXT:    [facility]_Conflicting Documentation        Instruction:    Based on your assessment of the patient and the clinical information, please provide the requested documentation by clicking on the appropriate radio button and enter any additional information if prompted.    Question: Please clarify the diagnoses of vasoplegia, post operative shock    When answering this query, please exercise your independent professional judgment. The fact that a question is being asked, does not imply that any particular answer is desired or expected.    The patient's clinical indicators include:  Clinical Information: 73 year old man for elective AVR on . Pt's PMH includes CAD, HTN, COPD    Clinical Indicators:  Critical Care Progress Notes : Dru Pan/Clara  \"Postoperative vasoplegia\"  Attending: \"Post Op shock- Continue levophed with goal Map >65.  Add epi for chronotropic support\"    CT Surgery [place] Notes : NP Head  \" On levophed 0.05 overnight with ScVo2   50s and CI 1.7...Pa Pressures improved throughout afternoon to PA sys    30s...Titrate levophed to maintain goal MAP 65-75; now off- Start Epi 0.04 for inotropic support, wean for CI > 2.2\"    Treatment:  -Levophed drip: -    Risk Factors: age, AVR  Options provided:  -- Vasoplegia, post operative shock are ruled out after study  -- Vasoplegia, post operative shock are confirmed  -- Other - I will add my own diagnosis  -- Refer to Clinical Documentation Reviewer    Query created by: Giovanna Corado on 2024 2:27 PM      Electronically signed by:  LORI MAY 2024 6:47 PM          "

## 2024-01-24 NOTE — HH CARE COORDINATION
This referral has been made a Non Admit with  Home Care due to OUT OF SERVICE AREA . If you have further questions, feel free to reach out to our office at [phone]. Thank you, [facility] Intake.

## 2024-01-25 LAB
LABORATORY COMMENT REPORT: NORMAL
PATH REPORT.FINAL DX SPEC: NORMAL
PATH REPORT.GROSS SPEC: NORMAL
PATH REPORT.RELEVANT HX SPEC: NORMAL
PATH REPORT.TOTAL CANCER: NORMAL

## 2024-01-27 LAB
ATRIAL RATE: 66 BPM
ATRIAL RATE: 66 BPM
ATRIAL RATE: 86 BPM
ATRIAL RATE: 89 BPM
DIASTOLIC BLOOD PRESSURE: 53 MMHG
DIASTOLIC BLOOD PRESSURE: 55 MMHG
DIASTOLIC BLOOD PRESSURE: 78 MMHG
P AXIS: -11 DEGREES
P AXIS: 32 DEGREES
P AXIS: 54 DEGREES
P OFFSET: 128 MS
P OFFSET: 200 MS
P ONSET: 156 MS
P ONSET: 79 MS
PR INTERVAL: 128 MS
PR INTERVAL: 224 MS
PR INTERVAL: 224 MS
PR INTERVAL: 330 MS
Q ONSET: 208 MS
Q ONSET: 210 MS
Q ONSET: 220 MS
Q ONSET: 244 MS
QRS COUNT: 10 BEATS
QRS COUNT: 11 BEATS
QRS COUNT: 15 BEATS
QRS COUNT: 15 BEATS
QRS DURATION: 104 MS
QRS DURATION: 108 MS
QRS DURATION: 114 MS
QRS DURATION: 80 MS
QT INTERVAL: 308 MS
QT INTERVAL: 426 MS
QT INTERVAL: 430 MS
QT INTERVAL: 478 MS
QTC CALCULATION(BAZETT): 368 MS
QTC CALCULATION(BAZETT): 446 MS
QTC CALCULATION(BAZETT): 450 MS
QTC CALCULATION(BAZETT): 581 MS
QTC FREDERICIA: 347 MS
QTC FREDERICIA: 440 MS
QTC FREDERICIA: 444 MS
QTC FREDERICIA: 544 MS
R AXIS: -32 DEGREES
R AXIS: -37 DEGREES
R AXIS: 266 DEGREES
R AXIS: 27 DEGREES
SYSTOLIC BLOOD PRESSURE: 101 MMHG
SYSTOLIC BLOOD PRESSURE: 170 MMHG
SYSTOLIC BLOOD PRESSURE: 99 MMHG
T AXIS: -50 DEGREES
T AXIS: -6 DEGREES
T AXIS: 139 DEGREES
T AXIS: 21 DEGREES
T OFFSET: 374 MS
T OFFSET: 423 MS
T OFFSET: 423 MS
T OFFSET: 483 MS
VENTRICULAR RATE: 66 BPM
VENTRICULAR RATE: 66 BPM
VENTRICULAR RATE: 86 BPM
VENTRICULAR RATE: 89 BPM

## 2024-01-29 ENCOUNTER — DOCUMENTATION (OUTPATIENT)
Dept: RESEARCH | Age: 74
End: 2024-01-29
Payer: MEDICARE

## 2024-01-29 NOTE — RESEARCH NOTES
Attempt 1: Called patient at 3:40  p.m. on 2024 and patient requested I call back at 4:00 p.m.   Attempt 2: Called patient at 4:03 p.m. on 2024, patient responded and answered all questions from the stroke questionnaire. The patient does not have any questions about the LeAAPS trial at this time.     SUBJECT ID:  [id number]     Visit Date: (Date of the coordinator visit)  2024     ICU Admission Date:   2024     2. ICU Discharge Date:  2024     3. Was Patient READMITTED to ICU/CCU during index hospitalization: [x]NO []YES  If yes answer the following Questions:  3a) ICU Admission Date:      3b) ICU Discharge Date:        Hospital Discharge Date:       Discharge Location (check one):  [x] Home  [] Transferred to another acute care facility(complete transfer form)  [] Long-term care/nursing home  [] Transferred to rehabilitation facility   [] (never discharged - Complete death form)    Procedures in Hospital(only fill out for Reoperation within 48 hours)    A) Reoperation within 48 hours:  [x]No []Yes  B) Indication for reoperation (check all that apply)  [] Bleeding  [] Pericardial tamponade  [] Acute Valve/ graft failure  [] Acute bypass graft failure  [] Need for additional bypass  [] Other: Specify        Chest tube output until chest tube removed or 24 hurs post-op, whichever is earlier:  (Unit mL)  6030     Clinical Events: (Be sure to fill AE/VERO report form and apply the Report number )    1. Atrial fibrillation or flutter    [x]No  []Yes        2. Myocardial infarction    [x]No  []Yes        3. Systemic arterial embolism    [x]No  []Yes        4. Stroke/transient ischemic attack (TIA)    [x]No  []Yes        5. Insertion of permanent pacemaker    [x]No  []Yes        6. Pericardial effusion requiring intervention    [x]No  []Yes        7. Deep sternal wound infection    [x]No  []Yes        8. Other Adverse event/serious adverse event   [x]No  []Yes        9. Bleed    [x]No  []Yes       Concomitant Medications At Hospital Discharge    1. ASA    [x]No  []Yes      2. Clopidogrel    [x]No  []Yes      3. Prasugrel    [x]No  []Yes      4. Ticagrelor    [x]No  []Yes      5. Other anti-platelet    [x]No  []Yes        6. Vitamin K antagonist    [x]No  []Yes        7. Dabigatran    [x]No  []Yes        8. Rivaroxaban    [x]No  []Yes        9. Apixaban    []No  [x]Yes        10. Edoxaban    [x]No  []Yes        11. Diuretic    []No  [x]Yes      12. Colchicine    [x]No  []Yes      13. ACE inhibitor    [x]No  []Yes      14. ARB agent    [x]No  []Yes      15. Sacubitril/Valsartan    [x]No  []Yes        16. Beta Blocker    []No  [x]Yes        17. Digoxin   [x]No  []Yes        18. Amiodarone   [x]No  []Yes        19. Calcium channel blocker (dihydropyridine - e.g. amlodipine, nifedioine)    [x]No  []Yes      20. Calcium channel blocker (non-dihydropyridine - e.g. verapamil, diltiazem)    [x]No  []Yes        21. Dronedarone    [x]No  []Yes      22. Dofetilide    [x]No  []Yes      23. Sotalol    [x]No  []Yes      24. Statin    []No  [x]Yes      DON'T FORGET TO COMPLETE STROKE FREE STATUS QUESTIONNAIRE ON SEPARATE CRF FORM!      SUBJECT ID:  [id number]       Visit Type:  HOSPITAL DISCHARGE    Was the questionnaire for verifying stroke free status completed?   [x] YES  [] NO  If No,   []Patient refused  []Patient unable to respond  []Questionnaire not administered   []Other, specify        Date Completed   01/29/2024       1. Were you ever told by a physician that you had a stroke?      []No  [x]Yes  []Unknown      2. Were you ever told by a physician that you had a TIA, ministroke, or transient ischemic attack?      []No  [x]Yes  []Unknown        3. Have you ever had sudden painless weakness on one side of your body?      [x]No  []Yes  []Unknown      4. Have you ever had sudden numbness or a dead feeling on one side of your body?      []No  []Yes  [x]Unknown      5. Have you ever had sudden painless loss of vision  in one or both eyes?      []No  [x]Yes  []Unknown      6. Have you ever suddenly lost one-half of your vision?     [x]No  []Yes  []Unknown        7. Have you ever suddenly lost the ability to understand what people are saying?      []No  [x]Yes  []Unknown      8. Have you ever suddenly lost the ability to express yourself verbally or in writing?      []No  [x]Yes  []Unknown

## 2024-01-31 ENCOUNTER — HOSPITAL ENCOUNTER (OUTPATIENT)
Dept: RADIOLOGY | Facility: CLINIC | Age: 74
Discharge: HOME | End: 2024-01-31
Payer: MEDICARE

## 2024-01-31 DIAGNOSIS — Z95.2 S/P AORTIC VALVE REPLACEMENT: ICD-10-CM

## 2024-01-31 PROCEDURE — 71046 X-RAY EXAM CHEST 2 VIEWS: CPT

## 2024-01-31 PROCEDURE — 71046 X-RAY EXAM CHEST 2 VIEWS: CPT | Performed by: RADIOLOGY

## 2024-02-07 LAB
ATRIAL RATE: 63 BPM
P AXIS: 18 DEGREES
P OFFSET: 152 MS
P ONSET: 112 MS
PR INTERVAL: 194 MS
Q ONSET: 209 MS
QRS COUNT: 10 BEATS
QRS DURATION: 98 MS
QT INTERVAL: 414 MS
QTC CALCULATION(BAZETT): 423 MS
QTC FREDERICIA: 420 MS
R AXIS: -29 DEGREES
T AXIS: 0 DEGREES
T OFFSET: 416 MS
VENTRICULAR RATE: 63 BPM

## 2024-02-08 ENCOUNTER — TELEMEDICINE CLINICAL SUPPORT (OUTPATIENT)
Dept: CARDIAC SURGERY | Facility: CLINIC | Age: 74
End: 2024-02-08
Payer: MEDICARE

## 2024-02-09 NOTE — PROGRESS NOTES
Virtual visit with Jerel ESBASTIAN Drummond this morning who states he's recovering well after surgery.     Virtual topics discussed:    Nuero: appropriate for situation, seems in good spirits.   Lungs: denies sob, advised to continue using IS.   Surgical incision: patient states that incision are healing well, denies signs of infection or fever.   Medications: reviewed medications, taking all as prescribed.   Homecare: weekly visits scheduled at this time.   Physical activity: ambulates without difficutly.   Appts: reviewed upcoming appts. Patient states that he already had CXR complete.   Patient states that he is not taking BP/Wt as it was recommended. Reiterated importance of trending these vital signs.       Patient encouraged to call me with any questions/concerns or if needs arise. Patient verbalized understanding.         Dottie Kelley RN

## 2024-03-01 DIAGNOSIS — Z95.2 S/P AORTIC VALVE REPLACEMENT: ICD-10-CM

## 2024-03-01 RX ORDER — PANTOPRAZOLE SODIUM 40 MG/1
40 TABLET, DELAYED RELEASE ORAL
COMMUNITY
Start: 2024-01-20

## 2024-03-01 RX ORDER — GABAPENTIN 100 MG/1
100 CAPSULE ORAL EVERY 8 HOURS
COMMUNITY
Start: 2024-01-19 | End: 2024-03-04 | Stop reason: ALTCHOICE

## 2024-03-01 RX ORDER — ASPIRIN 81 MG/1
TABLET ORAL
COMMUNITY

## 2024-03-01 RX ORDER — LOSARTAN POTASSIUM 50 MG/1
TABLET ORAL
COMMUNITY
End: 2024-03-04 | Stop reason: ALTCHOICE

## 2024-03-01 RX ORDER — LANOLIN ALCOHOL/MO/W.PET/CERES
400 CREAM (GRAM) TOPICAL
COMMUNITY
Start: 2024-01-19

## 2024-03-01 RX ORDER — IPRATROPIUM BROMIDE AND ALBUTEROL SULFATE 2.5; .5 MG/3ML; MG/3ML
3 SOLUTION RESPIRATORY (INHALATION)
COMMUNITY
Start: 2024-01-23 | End: 2024-03-04 | Stop reason: ALTCHOICE

## 2024-03-04 ENCOUNTER — HOSPITAL ENCOUNTER (OUTPATIENT)
Dept: RADIOLOGY | Facility: HOSPITAL | Age: 74
Discharge: HOME | End: 2024-03-04
Payer: MEDICARE

## 2024-03-04 ENCOUNTER — OFFICE VISIT (OUTPATIENT)
Dept: CARDIAC SURGERY | Facility: CLINIC | Age: 74
End: 2024-03-04
Payer: MEDICARE

## 2024-03-04 VITALS
SYSTOLIC BLOOD PRESSURE: 112 MMHG | WEIGHT: 223 LBS | RESPIRATION RATE: 24 BRPM | DIASTOLIC BLOOD PRESSURE: 75 MMHG | BODY MASS INDEX: 29.55 KG/M2 | HEIGHT: 73 IN | OXYGEN SATURATION: 97 % | TEMPERATURE: 98.1 F | HEART RATE: 73 BPM

## 2024-03-04 DIAGNOSIS — Z95.2 S/P AORTIC VALVE REPLACEMENT: ICD-10-CM

## 2024-03-04 DIAGNOSIS — I48.0 PAROXYSMAL A-FIB (MULTI): ICD-10-CM

## 2024-03-04 PROCEDURE — 1126F AMNT PAIN NOTED NONE PRSNT: CPT | Performed by: THORACIC SURGERY (CARDIOTHORACIC VASCULAR SURGERY)

## 2024-03-04 PROCEDURE — 99024 POSTOP FOLLOW-UP VISIT: CPT | Performed by: THORACIC SURGERY (CARDIOTHORACIC VASCULAR SURGERY)

## 2024-03-04 PROCEDURE — 1159F MED LIST DOCD IN RCRD: CPT | Performed by: THORACIC SURGERY (CARDIOTHORACIC VASCULAR SURGERY)

## 2024-03-04 PROCEDURE — 3074F SYST BP LT 130 MM HG: CPT | Performed by: THORACIC SURGERY (CARDIOTHORACIC VASCULAR SURGERY)

## 2024-03-04 PROCEDURE — 3078F DIAST BP <80 MM HG: CPT | Performed by: THORACIC SURGERY (CARDIOTHORACIC VASCULAR SURGERY)

## 2024-03-04 PROCEDURE — 1036F TOBACCO NON-USER: CPT | Performed by: THORACIC SURGERY (CARDIOTHORACIC VASCULAR SURGERY)

## 2024-03-04 PROCEDURE — 71046 X-RAY EXAM CHEST 2 VIEWS: CPT | Performed by: RADIOLOGY

## 2024-03-04 PROCEDURE — 1157F ADVNC CARE PLAN IN RCRD: CPT | Performed by: THORACIC SURGERY (CARDIOTHORACIC VASCULAR SURGERY)

## 2024-03-04 PROCEDURE — 71046 X-RAY EXAM CHEST 2 VIEWS: CPT

## 2024-03-04 ASSESSMENT — ENCOUNTER SYMPTOMS
OCCASIONAL FEELINGS OF UNSTEADINESS: 1
DEPRESSION: 0
LOSS OF SENSATION IN FEET: 1

## 2024-03-04 ASSESSMENT — PAIN SCALES - GENERAL: PAINLEVEL: 0-NO PAIN

## 2024-03-04 NOTE — PROGRESS NOTES
Chief Complaint  POST OP Evaluation    HPI:   Mr. Jerel Drummond is a 73 y.o. male, who presents for post-operative evaluation.   He is now 1 month out from his operation, and is recovering nicely.  He has resumed normal activities and his appetite is returned to normal.  He has no chest pain, no shortness of breath and denies palpitations, dizziness, or syncope.     Past Medical History:   Diagnosis Date    Aortic stenosis     Restrepo's esophagus     Cataract     Cerebral vascular accident (CMS/HCC)     COPD (chronic obstructive pulmonary disease) (CMS/[facility])     Coronary artery disease     Depression     Fracture, ribs     GERD (gastroesophageal reflux disease)     HTN (hypertension)     Hyperlipidemia     Insomnia     Myocardial infarction (CMS/HCC)     Neuropathy     Sleep apnea        Past Surgical History:   Procedure Laterality Date    CATARACT EXTRACTION      CORONARY ANGIOPLASTY WITH STENT PLACEMENT      HERNIA REPAIR      KNEE ARTHROPLASTY      RETINAL DETACHMENT SURGERY      RIB FRACTURE SURGERY         Family History   Problem Relation Name Age of Onset    Stomach cancer Mother         Social History     Socioeconomic History    Marital status:      Spouse name: Not on file    Number of children: Not on file    Years of education: Not on file    Highest education level: Not on file   Occupational History    Not on file   Tobacco Use    Smoking status: Former     Types: Cigarettes    Smokeless tobacco: Never   Substance and Sexual Activity    Alcohol use: Not Currently    Drug use: Yes     Types: Marijuana     Comment: twice per week for foot neuropathy    Sexual activity: Defer   Other Topics Concern    Not on file   Social History Narrative    Not on file     Social Determinants of Health     Financial Resource Strain: Low Risk  (1/19/2024)    Overall Financial Resource Strain (CARDIA)     Difficulty of Paying Living Expenses: Not hard at all   Food Insecurity: No Food Insecurity (12/4/2023)     Hunger Vital Sign     Worried About Running Out of Food in the Last Year: Never true     Ran Out of Food in the Last Year: Never true   Transportation Needs: No Transportation Needs (1/19/2024)    PRAPARE - Transportation     Lack of Transportation (Medical): No     Lack of Transportation (Non-Medical): No   Physical Activity: Not on file   Stress: Not on file   Social Connections: Not on file   Intimate Partner Violence: Not on file   Housing Stability: Low Risk  (1/19/2024)    Housing Stability Vital Sign     Unable to Pay for Housing in the Last Year: No     Number of Places Lived in the Last Year: 1     Unstable Housing in the Last Year: No       Allergies   Allergen Reactions    Chlorine Swelling    Ciprofloxacin Unknown    Codeine Itching    Meloxicam Other    Meperidine Other    Clindamycin Palpitations       Outpatient Encounter Medications as of 3/4/2024   Medication Sig Dispense Refill    acetaminophen (Tylenol) 325 mg tablet Take 3 tablets (975 mg) by mouth every 6 hours if needed for mild pain (1 - 3).      apixaban (Eliquis) 5 mg tablet Take 1 tablet (5 mg) by mouth every 12 hours. 60 tablet 0    aspirin 81 mg EC tablet Take by mouth once daily.      atorvastatin (Lipitor) 80 mg tablet Take 1 tablet (80 mg) by mouth once daily.      calcium carbonate-vitamin D3 600 mg-5 mcg (200 unit) tablet Take 1 tablet by mouth twice a day.      cetirizine (ZYRTEC) 10 mg capsule Take 1 capsule (10 mg) by mouth once daily.      cholecalciferol (Vitamin D3) 5,000 Units tablet Take 1 tablet (5,000 Units) by mouth once daily.      cyanocobalamin (Vitamin B-12) 500 mcg tablet Take 1 tablet (500 mcg) by mouth.      escitalopram (Lexapro) 10 mg tablet Take 1 tablet (10 mg) by mouth 2 times a day.      furosemide (Lasix) 20 mg tablet Take 0.5 tablets (10 mg) by mouth once daily. Do not start before January 25, 2024. 30 tablet 0    magnesium oxide (Mag-Ox) 400 mg (241.3 mg magnesium) tablet Take 1 tablet (400 mg) by mouth  once daily.      melatonin 10 mg capsule Take 1 capsule (10 mg) by mouth once daily at bedtime.      metoprolol succinate XL (Toprol-XL) 25 mg 24 hr tablet Take 3 tablets (75 mg) by mouth once daily. Do not crush or chew. Do not start before January 25, 2024. 30 tablet 0    omeprazole (PriLOSEC) 40 mg DR capsule Take 20 mg by mouth once daily in the morning. Take before meals.      pantoprazole (ProtoNix) 40 mg EC tablet Take 1 tablet (40 mg) by mouth.      zolpidem (Ambien) 5 mg tablet Take 1 tablet (5 mg) by mouth once daily at bedtime.      [DISCONTINUED] gabapentin (Neurontin) 100 mg capsule Take 1 capsule (100 mg) by mouth every 8 hours.      [DISCONTINUED] ipratropium-albuteroL (Duo-Neb) 0.5-2.5 mg/3 mL nebulizer solution Inhale 3 mL.      bisacodyl (Dulcolax) 5 mg EC tablet Take 2 tablets (10 mg) by mouth once daily as needed for constipation. Do not crush, chew, or split. (Patient not taking: Reported on 3/4/2024) 30 tablet 0    oxyCODONE (Roxicodone) 5 mg immediate release tablet Take 1 tablet (5 mg) by mouth every 8 hours if needed for moderate pain (4 - 6). (Patient not taking: Reported on 3/4/2024) 15 tablet 0    ticagrelor (Brilinta) 90 mg tablet Take 1 tablet (90 mg) by mouth 2 times a day for 730 doses. (Patient not taking: Reported on 3/4/2024) 60 tablet 0    [DISCONTINUED] losartan (Cozaar) 50 mg tablet Take by mouth once daily.       No facility-administered encounter medications on file as of 3/4/2024.       Physical Exam  Constitutional:       Appearance: Normal appearance.   HENT:      Head: Normocephalic and atraumatic.   Eyes:      General: No scleral icterus.     Pupils: Pupils are equal, round, and reactive to light.   Cardiovascular:      Rate and Rhythm: Normal rate and regular rhythm.      Heart sounds: No murmur heard.     Comments: Incisions all well healed.   Neurological:      Mental Status: He is alert.         Encounter Date: 01/24/24   ECG 12 lead (Ancillary Performed)   Result  Value    Systolic blood pressure 170    Diastolic blood pressure 78    Ventricular Rate 86    Atrial Rate 86    WY Interval 128    QRS Duration 80    QT Interval 308    QTC Calculation(Bazett) 368    P Axis 54    R Axis 27    T Axis 139    QRS Count 15    Q Onset 220    P Onset 156    P Offset 200    T Offset 374    QTC Fredericia 347    Narrative    Normal sinus rhythm  Nonspecific ST and T wave abnormality  Abnormal ECG    Confirmed by Ramicone, James ([id number]) on 1/27/2024 6:48:28 PM     CXR:  3/4/24:  Sternal wires in place and inact.  Heart borders clear.  jAortic prosthesis in place. No effusion no Ptx.     Assessment and Plan:    Mr. Jerel Drummond is a 73 y.o. male, who is recovering well after surgery.  I have released them from sternal precautions and referred them for cardiopulmonary rehab.  I have encouraged them to slowly return to normal activities, but refrain from heavy lifting for a full 12 weeks after surgery.  He will continue cardiovascular management with their cardiologist. Dr. Varner will survey his valve.  I am always happy to see them for any reason, but have released them from the practice.

## 2024-04-03 ENCOUNTER — TELEPHONE (OUTPATIENT)
Dept: CARDIAC SURGERY | Facility: CLINIC | Age: 74
End: 2024-04-03
Payer: MEDICARE

## 2024-04-03 NOTE — TELEPHONE ENCOUNTER
[facility]   Cardiothoracic Surgery   Clinical Update Note    Interval HPI: Briefly, Mr. Drummond is a 75 y/o M who is s/p AVR (25mm Med[employer]onic Avalus 400 Bioprosthesis) with Dr. Fernando dEdy on 1/19/2024.     The patient was on ASA 81mg and Ticagrelor 90mg for his CAD and previous PCI. In the post-op period, he was started on apxiaban 5mg BID for 3 months valve prophylaxis.     As we are reaching the 3 month post-op period, the patient is okay to discontinue his apixaban 5mg BID and should resume ASA 81mg. Further P2Y12 management per cardiology.     This was communicated with SELIN Bowman with cardiologist Dr. Varner's office.     Current Outpatient Medications on File Prior to Visit   Medication Sig Dispense Refill    acetaminophen (Tylenol) 325 mg tablet Take 3 tablets (975 mg) by mouth every 6 hours if needed for mild pain (1 - 3).      apixaban (Eliquis) 5 mg tablet Take 1 tablet (5 mg) by mouth every 12 hours. 60 tablet 0    aspirin 81 mg EC tablet Take by mouth once daily.      atorvastatin (Lipitor) 80 mg tablet Take 1 tablet (80 mg) by mouth once daily.      bisacodyl (Dulcolax) 5 mg EC tablet Take 2 tablets (10 mg) by mouth once daily as needed for constipation. Do not crush, chew, or split. (Patient not taking: Reported on 3/4/2024) 30 tablet 0    calcium carbonate-vitamin D3 600 mg-5 mcg (200 unit) tablet Take 1 tablet by mouth twice a day.      cetirizine (ZYRTEC) 10 mg capsule Take 1 capsule (10 mg) by mouth once daily.      cholecalciferol (Vitamin D3) 5,000 Units tablet Take 1 tablet (5,000 Units) by mouth once daily.      cyanocobalamin (Vitamin B-12) 500 mcg tablet Take 1 tablet (500 mcg) by mouth.      escitalopram (Lexapro) 10 mg tablet Take 1 tablet (10 mg) by mouth 2 times a day.      furosemide (Lasix) 20 mg tablet Take 0.5 tablets (10 mg) by mouth once daily. Do not start before January 25, 2024. 30 tablet 0    magnesium oxide (Mag-Ox) 400 mg (241.3 mg magnesium) tablet Take  1 tablet (400 mg) by mouth once daily.      melatonin 10 mg capsule Take 1 capsule (10 mg) by mouth once daily at bedtime.      metoprolol succinate XL (Toprol-XL) 25 mg 24 hr tablet Take 3 tablets (75 mg) by mouth once daily. Do not crush or chew. Do not start before January 25, 2024. 30 tablet 0    omeprazole (PriLOSEC) 40 mg DR capsule Take 20 mg by mouth once daily in the morning. Take before meals.      oxyCODONE (Roxicodone) 5 mg immediate release tablet Take 1 tablet (5 mg) by mouth every 8 hours if needed for moderate pain (4 - 6). (Patient not taking: Reported on 3/4/2024) 15 tablet 0    pantoprazole (ProtoNix) 40 mg EC tablet Take 1 tablet (40 mg) by mouth.      ticagrelor (Brilinta) 90 mg tablet Take 1 tablet (90 mg) by mouth 2 times a day for 730 doses. (Patient not taking: Reported on 3/4/2024) 60 tablet 0    zolpidem (Ambien) 5 mg tablet Take 1 tablet (5 mg) by mouth once daily at bedtime.       No current facility-administered medications on file prior to visit.         Yaya Argueta, APRN-CNP

## 2024-08-14 ENCOUNTER — DOCUMENTATION (OUTPATIENT)
Dept: CARDIOLOGY | Facility: HOSPITAL | Age: 74
End: 2024-08-14
Payer: MEDICARE

## 2024-08-14 NOTE — PROGRESS NOTES
Spoke with patient today as follow up to the LeAAPS Trial.  Verified contact information and next point of follow up.

## 2025-01-31 ENCOUNTER — DOCUMENTATION (OUTPATIENT)
Dept: CARDIOLOGY | Facility: HOSPITAL | Age: 75
End: 2025-01-31
Payer: MEDICARE

## 2025-01-31 NOTE — PROGRESS NOTES
Spoke with patient today as follow up to the LeAAPS Trial.  Visit completed.  Of note new phone number is [phone]. Plan for next call in 6 months.

## 2025-08-15 ENCOUNTER — DOCUMENTATION (OUTPATIENT)
Dept: CARDIOLOGY | Facility: HOSPITAL | Age: 75
End: 2025-08-15
Payer: MEDICARE

## (undated) DEVICE — SINGLES, QLU, COR-KNOT

## (undated) DEVICE — CATHETER, THERMODILUTION, SWAN GANZ, VIP, 5 LUMEN, 7.5 FR, 110 CM

## (undated) DEVICE — SPONGE, LAP, XRAY DECT, 18IN X 18IN, W/MASTER DMT, STERILE

## (undated) DEVICE — CANNULA, LEFT HEART, VENT

## (undated) DEVICE — KIT, CARDIOVASCLAR PROCEDURE, ADULT, W/AVD

## (undated) DEVICE — SPONGE, DISSECTOR, PEANUT, 3/8, STERILE 5 FOAM HOLDER"

## (undated) DEVICE — CANNULA, VENOUS 2 STAGE 32/40

## (undated) DEVICE — KIT, FAST START

## (undated) DEVICE — DRAIN, CHANNEL, BLAKE, HUBLESS, ROUND, 28FR

## (undated) DEVICE — SUTURE, STEEL, 7, 18 IN, CCS, SILVER

## (undated) DEVICE — Device

## (undated) DEVICE — TIP, SUCTION, YANKAUER, W/O VENT, FLEXIBLE, OPEN TIP, HIGH CAPACITY

## (undated) DEVICE — CANNULA, MULTIPLE PERFUSION SET, 15"

## (undated) DEVICE — GEL, ULTRASOUND, AQUASONIC 100, 20 GM, STERILE

## (undated) DEVICE — BLADE, SAW STERNUM, STERILE

## (undated) DEVICE — TUBING, CLEAR N-COND, 5MM X 10, LF

## (undated) DEVICE — TRAY, SURESTEP, URINE METER, 14FR, SILICONE

## (undated) DEVICE — SOLUTION, INJECTION, USP, SODIUM CHLORIDE 0.9%, .9, NACL, 1000 ML, BAG

## (undated) DEVICE — SOLUTION, IRRIGATION, USP, SODIUM CHLORIDE 0.9%, 3000 ML, BAG

## (undated) DEVICE — APPLICATOR, CHLORAPREP, W/ORANGE TINT, 26ML

## (undated) DEVICE — GLIDESCOPE BLADE, SPECTRUM SU, LOPRO S4

## (undated) DEVICE — SUTURE, PROLENE 4-0, TAPER POINT, SH-1 BLUE 30 INCH

## (undated) DEVICE — SUTURE, NUROLON, 0, 18 IN, CT1, DETACHABLE, MULTIPACK, BLACK

## (undated) DEVICE — TUBING, SUCTION, CONNECTING, NON-CONDUCTIVE, SURE GRIP CONNECTORS, 3/16 X 18 IN, PVC

## (undated) DEVICE — CONNECTOR, 3/8 X 1/4, STRAIGHT, STERILE

## (undated) DEVICE — SUTURE, ETHIBOND, XTRA, 30 IN, 0, CT-1, GREEN

## (undated) DEVICE — BONE WAX, 2.5G ABSORBABLE, OSTENE

## (undated) DEVICE — SENSOR, OXYGEN, CEREBRAL, SOMASENSOR, ADULT

## (undated) DEVICE — SUTURE, VICRYL, 0, 18 IN, CT-1, UNDYED

## (undated) DEVICE — EXTENSION SET, IV, SOFT, 20 IN

## (undated) DEVICE — CANNULA, EOPA 20F W/O GUIDEWIRE

## (undated) DEVICE — SHUNT, SENSOR

## (undated) DEVICE — CASSETTE, BLOOD, PLEGIC SET

## (undated) DEVICE — DRESSING, ISLAND, TELFA, 4 X 5 IN

## (undated) DEVICE — INTRODUCER KIT, HI-FLOW, 9FR, WITH HEMO VALVE, NON-LIDOCAINE

## (undated) DEVICE — CONNECTOR, 1/2 X 1/2, STRAIGHT, STERILE

## (undated) DEVICE — KIT, EXTENSION LINE, PRESSURE, RETROGRADE

## (undated) DEVICE — FILTER, IV, BLOOD, MICROAGGREGATE, 40 MIC, RBC TRANSFUSION

## (undated) DEVICE — CANNULA, RETROGRADE, 15FR, W/AUTO INFLATE

## (undated) DEVICE — DRESSING, MEDIPORE W/PAD, 3-1/2X13-3/4 IN

## (undated) DEVICE — PACING CABLE, EXTENSION, 12 FT BLUE, DISPOSABLE

## (undated) DEVICE — CANNULA, AORTIC, ROOT, STANDARD, FLANGE, RADIOPAQUE TIP, W/FLOW-GUARD, 9 FR X 14 CM

## (undated) DEVICE — MICROCOAGULATION TEST, ACT+ TEST CUVETTE

## (undated) DEVICE — HEMOSTAT, ABSORABABLE, SURGICEL SNOW, 2 X 4, LF

## (undated) DEVICE — PADS, STOCKERT MOUNTING F/LOW LEVEL SENSOR II

## (undated) DEVICE — SPONGE, HEMOSTATIC, GELATIN, SURGIFOAM, 8 X 12.5 CM X 10 MM

## (undated) DEVICE — CONNECTOR, 3/8 X 3/8, EQUAL WYE, STERILE

## (undated) DEVICE — ARTERIAL LINE KIT, 20GA X 12CM, CUSTOM

## (undated) DEVICE — COLLECTION UNIT, DRAINAGE, THORACIC, SINGLE TUBE, DRY SUCTION, ATS COMPATIBLE, OASIS 3600, LF

## (undated) DEVICE — PACING CABLE, EXTENSION, 12 FT BEIGE, DISPOSABLE

## (undated) DEVICE — INSERT, CLAMP, SURGICAL, SOFT/TRACTION, STEALTH, 5 MM

## (undated) DEVICE — TUBING PACK, OXYGENATOR, ADULT

## (undated) DEVICE — CANNULA, CARDIAC SUMP

## (undated) DEVICE — SOLUTION, INJECTION, USP, PLASMALYTE A, PH 7.4, TYPE 1, 1000 ML, BAG

## (undated) DEVICE — SUTURE, VICRYL, 4-0, 18 IN, PS2, UNDYED

## (undated) DEVICE — DRAPE, SHEET, CARDIOVASCULAR, ANTIMICROBIAL, W/ANESTHESIA SCREEN, IOBAN 2, STERI DRAPE, 107 X 133 IN, DISPOSABLE, FABRIC, BLUE, STERILE

## (undated) DEVICE — ELECTROSURGICAL, CLEANER, LECTROBRASIVE

## (undated) DEVICE — HANDLE COVER, LIGHT, RIGID, DISP, LF

## (undated) DEVICE — SYRINGE, 20 CC, LUER LOCK

## (undated) DEVICE — KIT, COR-KNOT MINI COMBO

## (undated) DEVICE — TIP, ELECTROSURGICAL, 4IN BLADE, MODIFIED, EXTENDED

## (undated) DEVICE — SPONGE, GAUZE, XRAY DECT, 16 PLY, 4 X 8, W/MASTER DMT,STERILE

## (undated) DEVICE — SUTURE, PROLENE, 3-0, 36 IN, SH, DA, BLUE

## (undated) DEVICE — COVER, EQUIPMENT, SOLUTION, SLUSH, 112 X 168 CM, LF, STERILE

## (undated) DEVICE — OXYGENATOR FX 25, W/HR, ARTERIAL FILTER

## (undated) DEVICE — DRAPE, INSTRUMENT, W/POUCH, STERI DRAPE, 7 X 11 IN, DISPOSABLE, STERILE